# Patient Record
Sex: MALE | Race: WHITE | Employment: OTHER | URBAN - METROPOLITAN AREA
[De-identification: names, ages, dates, MRNs, and addresses within clinical notes are randomized per-mention and may not be internally consistent; named-entity substitution may affect disease eponyms.]

---

## 2018-03-02 ENCOUNTER — TELEPHONE (OUTPATIENT)
Dept: UROLOGY | Age: 68
End: 2018-03-02

## 2023-04-23 ENCOUNTER — APPOINTMENT (OUTPATIENT)
Dept: CT IMAGING | Age: 73
DRG: 813 | End: 2023-04-23
Payer: MEDICARE

## 2023-04-23 ENCOUNTER — HOSPITAL ENCOUNTER (INPATIENT)
Age: 73
LOS: 2 days | Discharge: HOME | DRG: 813 | End: 2023-04-25
Attending: EMERGENCY MEDICINE | Admitting: STUDENT IN AN ORGANIZED HEALTH CARE EDUCATION/TRAINING PROGRAM
Payer: MEDICARE

## 2023-04-23 DIAGNOSIS — K62.5 RECTAL BLEEDING: Primary | ICD-10-CM

## 2023-04-23 PROBLEM — K92.1 HEMATOCHEZIA: Status: ACTIVE | Noted: 2023-04-23

## 2023-04-23 PROBLEM — I48.91 ATRIAL FIBRILLATION (HCC): Status: ACTIVE | Noted: 2023-04-23

## 2023-04-23 PROBLEM — E11.9 DIABETES MELLITUS (HCC): Status: ACTIVE | Noted: 2023-04-23

## 2023-04-23 PROBLEM — R79.1 SUPRATHERAPEUTIC INR: Status: ACTIVE | Noted: 2023-04-23

## 2023-04-23 LAB
ABO/RH: NORMAL
ABSOLUTE EOS #: 0 K/UL (ref 0–0.4)
ABSOLUTE LYMPH #: 0.9 K/UL (ref 1–4.8)
ABSOLUTE MONO #: 0.3 K/UL (ref 0.1–1.2)
ALBUMIN SERPL-MCNC: 4.3 G/DL (ref 3.5–5.2)
ALBUMIN/GLOBULIN RATIO: 1.5 (ref 1–2.5)
ALP SERPL-CCNC: 69 U/L (ref 40–129)
ALT SERPL-CCNC: 28 U/L (ref 5–41)
ANION GAP SERPL CALCULATED.3IONS-SCNC: 12 MMOL/L (ref 9–17)
ANTIBODY SCREEN: NEGATIVE
ARM BAND NUMBER: NORMAL
AST SERPL-CCNC: 23 U/L
BASOPHILS # BLD: 1 % (ref 0–2)
BASOPHILS ABSOLUTE: 0 K/UL (ref 0–0.2)
BILIRUB SERPL-MCNC: 1 MG/DL (ref 0.3–1.2)
BUN SERPL-MCNC: 17 MG/DL (ref 8–23)
CALCIUM SERPL-MCNC: 10.6 MG/DL (ref 8.6–10.4)
CHLORIDE SERPL-SCNC: 103 MMOL/L (ref 98–107)
CO2 SERPL-SCNC: 21 MMOL/L (ref 20–31)
CREAT SERPL-MCNC: 0.97 MG/DL (ref 0.7–1.2)
EOSINOPHILS RELATIVE PERCENT: 1 % (ref 1–4)
EXPIRATION DATE: NORMAL
GFR SERPL CREATININE-BSD FRML MDRD: >60 ML/MIN/1.73M2
GLUCOSE BLD-MCNC: 128 MG/DL (ref 75–110)
GLUCOSE SERPL-MCNC: 157 MG/DL (ref 70–99)
HCT VFR BLD AUTO: 43.2 % (ref 41–53)
HGB BLD-MCNC: 14.1 G/DL (ref 13.5–17.5)
INR PPP: 7.2
LACTATE PLASV-SCNC: 2.2 MMOL/L (ref 0.5–2.2)
LIPASE SERPL-CCNC: 35 U/L (ref 13–60)
LYMPHOCYTES # BLD: 26 % (ref 24–44)
MCH RBC QN AUTO: 29.9 PG (ref 26–34)
MCHC RBC AUTO-ENTMCNC: 32.7 G/DL (ref 31–37)
MCV RBC AUTO: 91.3 FL (ref 80–100)
MONOCYTES # BLD: 8 % (ref 2–11)
PARTIAL THROMBOPLASTIN TIME: 57.6 SEC (ref 21.3–31.3)
PDW BLD-RTO: 14.4 % (ref 12.5–15.4)
PLATELET # BLD AUTO: 170 K/UL (ref 140–450)
PMV BLD AUTO: 8.7 FL (ref 6–12)
POTASSIUM SERPL-SCNC: 4.4 MMOL/L (ref 3.7–5.3)
PROT SERPL-MCNC: 7.1 G/DL (ref 6.4–8.3)
PROTHROMBIN TIME: 70.6 SEC (ref 9.4–12.6)
RBC # BLD: 4.73 M/UL (ref 4.5–5.9)
SEG NEUTROPHILS: 64 % (ref 36–66)
SEGMENTED NEUTROPHILS ABSOLUTE COUNT: 2.2 K/UL (ref 1.8–7.7)
SODIUM SERPL-SCNC: 136 MMOL/L (ref 135–144)
WBC # BLD AUTO: 3.5 K/UL (ref 3.5–11)

## 2023-04-23 PROCEDURE — P9017 PLASMA 1 DONOR FRZ W/IN 8 HR: HCPCS

## 2023-04-23 PROCEDURE — 85730 THROMBOPLASTIN TIME PARTIAL: CPT

## 2023-04-23 PROCEDURE — 99285 EMERGENCY DEPT VISIT HI MDM: CPT

## 2023-04-23 PROCEDURE — 2060000000 HC ICU INTERMEDIATE R&B

## 2023-04-23 PROCEDURE — 85025 COMPLETE CBC W/AUTO DIFF WBC: CPT

## 2023-04-23 PROCEDURE — 2580000003 HC RX 258: Performed by: STUDENT IN AN ORGANIZED HEALTH CARE EDUCATION/TRAINING PROGRAM

## 2023-04-23 PROCEDURE — 86901 BLOOD TYPING SEROLOGIC RH(D): CPT

## 2023-04-23 PROCEDURE — 80053 COMPREHEN METABOLIC PANEL: CPT

## 2023-04-23 PROCEDURE — 36415 COLL VENOUS BLD VENIPUNCTURE: CPT

## 2023-04-23 PROCEDURE — 36430 TRANSFUSION BLD/BLD COMPNT: CPT

## 2023-04-23 PROCEDURE — 83690 ASSAY OF LIPASE: CPT

## 2023-04-23 PROCEDURE — 99222 1ST HOSP IP/OBS MODERATE 55: CPT | Performed by: STUDENT IN AN ORGANIZED HEALTH CARE EDUCATION/TRAINING PROGRAM

## 2023-04-23 PROCEDURE — 86927 PLASMA FRESH FROZEN: CPT

## 2023-04-23 PROCEDURE — 74176 CT ABD & PELVIS W/O CONTRAST: CPT

## 2023-04-23 PROCEDURE — 6360000002 HC RX W HCPCS

## 2023-04-23 PROCEDURE — 83605 ASSAY OF LACTIC ACID: CPT

## 2023-04-23 PROCEDURE — 86850 RBC ANTIBODY SCREEN: CPT

## 2023-04-23 PROCEDURE — 6370000000 HC RX 637 (ALT 250 FOR IP): Performed by: STUDENT IN AN ORGANIZED HEALTH CARE EDUCATION/TRAINING PROGRAM

## 2023-04-23 PROCEDURE — 6360000002 HC RX W HCPCS: Performed by: STUDENT IN AN ORGANIZED HEALTH CARE EDUCATION/TRAINING PROGRAM

## 2023-04-23 PROCEDURE — 85610 PROTHROMBIN TIME: CPT

## 2023-04-23 PROCEDURE — 86900 BLOOD TYPING SEROLOGIC ABO: CPT

## 2023-04-23 PROCEDURE — 82947 ASSAY GLUCOSE BLOOD QUANT: CPT

## 2023-04-23 RX ORDER — ISOSORBIDE MONONITRATE 60 MG/1
60 TABLET, EXTENDED RELEASE ORAL DAILY
Status: DISCONTINUED | OUTPATIENT
Start: 2023-04-24 | End: 2023-04-25 | Stop reason: HOSPADM

## 2023-04-23 RX ORDER — TRAMADOL HYDROCHLORIDE 50 MG/1
25 TABLET ORAL 3 TIMES DAILY
Status: DISCONTINUED | OUTPATIENT
Start: 2023-04-23 | End: 2023-04-24

## 2023-04-23 RX ORDER — WARFARIN SODIUM 2.5 MG/1
2.5 TABLET ORAL
COMMUNITY

## 2023-04-23 RX ORDER — METOPROLOL SUCCINATE 50 MG/1
50 TABLET, EXTENDED RELEASE ORAL DAILY
COMMUNITY

## 2023-04-23 RX ORDER — LOSARTAN POTASSIUM 50 MG/1
50 TABLET ORAL DAILY
COMMUNITY

## 2023-04-23 RX ORDER — ATORVASTATIN CALCIUM 40 MG/1
40 TABLET, FILM COATED ORAL NIGHTLY
COMMUNITY

## 2023-04-23 RX ORDER — INSULIN LISPRO 100 [IU]/ML
2 INJECTION, SOLUTION INTRAVENOUS; SUBCUTANEOUS
Status: DISCONTINUED | OUTPATIENT
Start: 2023-04-23 | End: 2023-04-25 | Stop reason: HOSPADM

## 2023-04-23 RX ORDER — METHYLPREDNISOLONE SODIUM SUCCINATE 40 MG/ML
INJECTION, POWDER, LYOPHILIZED, FOR SOLUTION INTRAMUSCULAR; INTRAVENOUS
Status: DISCONTINUED
Start: 2023-04-23 | End: 2023-04-23

## 2023-04-23 RX ORDER — ONDANSETRON 4 MG/1
4 TABLET, ORALLY DISINTEGRATING ORAL EVERY 8 HOURS PRN
Status: DISCONTINUED | OUTPATIENT
Start: 2023-04-23 | End: 2023-04-25 | Stop reason: HOSPADM

## 2023-04-23 RX ORDER — ATORVASTATIN CALCIUM 20 MG/1
20 TABLET, FILM COATED ORAL NIGHTLY
Status: DISCONTINUED | OUTPATIENT
Start: 2023-04-23 | End: 2023-04-25 | Stop reason: HOSPADM

## 2023-04-23 RX ORDER — PHYTONADIONE 5 MG/1
5 TABLET ORAL ONCE
Status: COMPLETED | OUTPATIENT
Start: 2023-04-23 | End: 2023-04-23

## 2023-04-23 RX ORDER — METHYLPREDNISOLONE SODIUM SUCCINATE 40 MG/ML
40 INJECTION, POWDER, LYOPHILIZED, FOR SOLUTION INTRAMUSCULAR; INTRAVENOUS ONCE
Status: CANCELLED | OUTPATIENT
Start: 2023-04-23 | End: 2023-04-23

## 2023-04-23 RX ORDER — SODIUM CHLORIDE 0.9 % (FLUSH) 0.9 %
5-40 SYRINGE (ML) INJECTION EVERY 12 HOURS SCHEDULED
Status: DISCONTINUED | OUTPATIENT
Start: 2023-04-23 | End: 2023-04-25 | Stop reason: HOSPADM

## 2023-04-23 RX ORDER — CARVEDILOL 3.12 MG/1
6.25 TABLET ORAL 2 TIMES DAILY WITH MEALS
Status: DISCONTINUED | OUTPATIENT
Start: 2023-04-23 | End: 2023-04-23

## 2023-04-23 RX ORDER — HYDRALAZINE HYDROCHLORIDE 50 MG/1
100 TABLET, FILM COATED ORAL 3 TIMES DAILY
Status: DISCONTINUED | OUTPATIENT
Start: 2023-04-23 | End: 2023-04-23

## 2023-04-23 RX ORDER — INSULIN GLARGINE 100 [IU]/ML
11 INJECTION, SOLUTION SUBCUTANEOUS NIGHTLY
Status: DISCONTINUED | OUTPATIENT
Start: 2023-04-23 | End: 2023-04-25 | Stop reason: HOSPADM

## 2023-04-23 RX ORDER — METHYLPREDNISOLONE SODIUM SUCCINATE 40 MG/ML
40 INJECTION, POWDER, LYOPHILIZED, FOR SOLUTION INTRAMUSCULAR; INTRAVENOUS ONCE
Status: COMPLETED | OUTPATIENT
Start: 2023-04-23 | End: 2023-04-23

## 2023-04-23 RX ORDER — NITROGLYCERIN 0.4 MG/1
0.4 TABLET SUBLINGUAL EVERY 5 MIN PRN
COMMUNITY

## 2023-04-23 RX ORDER — ONDANSETRON 2 MG/ML
4 INJECTION INTRAMUSCULAR; INTRAVENOUS EVERY 6 HOURS PRN
Status: DISCONTINUED | OUTPATIENT
Start: 2023-04-23 | End: 2023-04-25 | Stop reason: HOSPADM

## 2023-04-23 RX ORDER — LOSARTAN POTASSIUM 50 MG/1
50 TABLET ORAL DAILY
Status: DISCONTINUED | OUTPATIENT
Start: 2023-04-23 | End: 2023-04-25 | Stop reason: HOSPADM

## 2023-04-23 RX ORDER — DEXTROSE MONOHYDRATE 100 MG/ML
INJECTION, SOLUTION INTRAVENOUS CONTINUOUS PRN
Status: DISCONTINUED | OUTPATIENT
Start: 2023-04-23 | End: 2023-04-25 | Stop reason: HOSPADM

## 2023-04-23 RX ORDER — GABAPENTIN 400 MG/1
400 CAPSULE ORAL DAILY
Status: DISCONTINUED | OUTPATIENT
Start: 2023-04-24 | End: 2023-04-25 | Stop reason: HOSPADM

## 2023-04-23 RX ORDER — TAMSULOSIN HYDROCHLORIDE 0.4 MG/1
0.4 CAPSULE ORAL DAILY
Status: DISCONTINUED | OUTPATIENT
Start: 2023-04-24 | End: 2023-04-25 | Stop reason: HOSPADM

## 2023-04-23 RX ORDER — SODIUM CHLORIDE 0.9 % (FLUSH) 0.9 %
5-40 SYRINGE (ML) INJECTION PRN
Status: DISCONTINUED | OUTPATIENT
Start: 2023-04-23 | End: 2023-04-25 | Stop reason: HOSPADM

## 2023-04-23 RX ORDER — SODIUM CHLORIDE 9 MG/ML
INJECTION, SOLUTION INTRAVENOUS PRN
Status: DISCONTINUED | OUTPATIENT
Start: 2023-04-23 | End: 2023-04-25 | Stop reason: HOSPADM

## 2023-04-23 RX ORDER — METOPROLOL SUCCINATE 50 MG/1
50 TABLET, EXTENDED RELEASE ORAL DAILY
Status: DISCONTINUED | OUTPATIENT
Start: 2023-04-23 | End: 2023-04-25 | Stop reason: HOSPADM

## 2023-04-23 RX ORDER — POLYETHYLENE GLYCOL 3350 17 G/17G
17 POWDER, FOR SOLUTION ORAL DAILY PRN
Status: DISCONTINUED | OUTPATIENT
Start: 2023-04-23 | End: 2023-04-25 | Stop reason: HOSPADM

## 2023-04-23 RX ORDER — ACETAMINOPHEN 650 MG/1
650 SUPPOSITORY RECTAL EVERY 6 HOURS PRN
Status: DISCONTINUED | OUTPATIENT
Start: 2023-04-23 | End: 2023-04-25 | Stop reason: HOSPADM

## 2023-04-23 RX ORDER — ACETAMINOPHEN 325 MG/1
650 TABLET ORAL EVERY 6 HOURS PRN
Status: DISCONTINUED | OUTPATIENT
Start: 2023-04-23 | End: 2023-04-25 | Stop reason: HOSPADM

## 2023-04-23 RX ORDER — CLONIDINE HYDROCHLORIDE 0.1 MG/1
0.2 TABLET ORAL 2 TIMES DAILY
Status: DISCONTINUED | OUTPATIENT
Start: 2023-04-23 | End: 2023-04-23

## 2023-04-23 RX ADMIN — METHYLPREDNISOLONE SODIUM SUCCINATE 40 MG: 40 INJECTION, POWDER, FOR SOLUTION INTRAMUSCULAR; INTRAVENOUS at 16:16

## 2023-04-23 RX ADMIN — PHYTONADIONE 5 MG: 5 TABLET ORAL at 22:41

## 2023-04-23 RX ADMIN — SODIUM CHLORIDE, PRESERVATIVE FREE 10 ML: 5 INJECTION INTRAVENOUS at 21:31

## 2023-04-23 RX ADMIN — ATORVASTATIN CALCIUM 20 MG: 20 TABLET, FILM COATED ORAL at 21:31

## 2023-04-23 RX ADMIN — METHYLPREDNISOLONE SODIUM SUCCINATE 40 MG: 40 INJECTION, POWDER, LYOPHILIZED, FOR SOLUTION INTRAMUSCULAR; INTRAVENOUS at 16:16

## 2023-04-23 RX ADMIN — PHYTONADIONE 10 MG: 10 INJECTION, EMULSION INTRAMUSCULAR; INTRAVENOUS; SUBCUTANEOUS at 15:43

## 2023-04-23 ASSESSMENT — ENCOUNTER SYMPTOMS
DIARRHEA: 0
BACK PAIN: 0
NAUSEA: 0
BLOOD IN STOOL: 1
COUGH: 0
SORE THROAT: 0
WHEEZING: 0
ABDOMINAL PAIN: 1
VOMITING: 0
SHORTNESS OF BREATH: 0

## 2023-04-23 ASSESSMENT — PAIN SCALES - GENERAL
PAINLEVEL_OUTOF10: 1
PAINLEVEL_OUTOF10: 2

## 2023-04-23 ASSESSMENT — PAIN DESCRIPTION - LOCATION
LOCATION: ABDOMEN
LOCATION: ANKLE

## 2023-04-23 ASSESSMENT — PAIN DESCRIPTION - ORIENTATION
ORIENTATION: LEFT
ORIENTATION: LOWER

## 2023-04-23 ASSESSMENT — PAIN DESCRIPTION - DESCRIPTORS: DESCRIPTORS: ACHING

## 2023-04-23 ASSESSMENT — PAIN - FUNCTIONAL ASSESSMENT: PAIN_FUNCTIONAL_ASSESSMENT: 0-10

## 2023-04-24 LAB
ABSOLUTE EOS #: 0 K/UL (ref 0–0.4)
ABSOLUTE EOS #: 0 K/UL (ref 0–0.4)
ABSOLUTE LYMPH #: 0.5 K/UL (ref 1–4.8)
ABSOLUTE LYMPH #: 0.9 K/UL (ref 1–4.8)
ABSOLUTE MONO #: 0 K/UL (ref 0.1–1.2)
ABSOLUTE MONO #: 0.8 K/UL (ref 0.1–1.2)
ANION GAP SERPL CALCULATED.3IONS-SCNC: 15 MMOL/L (ref 9–17)
BASOPHILS # BLD: 0 % (ref 0–2)
BASOPHILS # BLD: 0 % (ref 0–2)
BASOPHILS ABSOLUTE: 0 K/UL (ref 0–0.2)
BASOPHILS ABSOLUTE: 0 K/UL (ref 0–0.2)
BLD PROD TYP BPU: NORMAL
BLOOD BANK BLOOD PRODUCT EXPIRATION DATE: NORMAL
BLOOD BANK ISBT PRODUCT BLOOD TYPE: 6200
BLOOD BANK PRODUCT CODE: NORMAL
BLOOD BANK UNIT TYPE AND RH: NORMAL
BPU ID: NORMAL
BUN SERPL-MCNC: 20 MG/DL (ref 8–23)
CALCIUM SERPL-MCNC: 10.5 MG/DL (ref 8.6–10.4)
CHLORIDE SERPL-SCNC: 103 MMOL/L (ref 98–107)
CO2 SERPL-SCNC: 17 MMOL/L (ref 20–31)
CREAT SERPL-MCNC: 1.1 MG/DL (ref 0.7–1.2)
DISPENSE STATUS BLOOD BANK: NORMAL
EOSINOPHILS RELATIVE PERCENT: 0 % (ref 1–4)
EOSINOPHILS RELATIVE PERCENT: 0 % (ref 1–4)
GFR SERPL CREATININE-BSD FRML MDRD: >60 ML/MIN/1.73M2
GLUCOSE BLD-MCNC: 142 MG/DL (ref 75–110)
GLUCOSE BLD-MCNC: 155 MG/DL (ref 75–110)
GLUCOSE BLD-MCNC: 237 MG/DL (ref 75–110)
GLUCOSE BLD-MCNC: 241 MG/DL (ref 75–110)
GLUCOSE SERPL-MCNC: 204 MG/DL (ref 70–99)
HCT VFR BLD AUTO: 41.1 % (ref 41–53)
HCT VFR BLD AUTO: 42.7 % (ref 41–53)
HGB BLD-MCNC: 13.6 G/DL (ref 13.5–17.5)
HGB BLD-MCNC: 14 G/DL (ref 13.5–17.5)
INR PPP: 1.9
LYMPHOCYTES # BLD: 10 % (ref 24–44)
LYMPHOCYTES # BLD: 10 % (ref 24–44)
MCH RBC QN AUTO: 29.9 PG (ref 26–34)
MCH RBC QN AUTO: 30.3 PG (ref 26–34)
MCHC RBC AUTO-ENTMCNC: 32.7 G/DL (ref 31–37)
MCHC RBC AUTO-ENTMCNC: 33.2 G/DL (ref 31–37)
MCV RBC AUTO: 91.2 FL (ref 80–100)
MCV RBC AUTO: 91.5 FL (ref 80–100)
MONOCYTES # BLD: 1 % (ref 2–11)
MONOCYTES # BLD: 8 % (ref 2–11)
PDW BLD-RTO: 14.5 % (ref 12.5–15.4)
PDW BLD-RTO: 14.6 % (ref 12.5–15.4)
PLATELET # BLD AUTO: 183 K/UL (ref 140–450)
PLATELET # BLD AUTO: 196 K/UL (ref 140–450)
PMV BLD AUTO: 8.5 FL (ref 6–12)
PMV BLD AUTO: 9.4 FL (ref 6–12)
POTASSIUM SERPL-SCNC: 4.5 MMOL/L (ref 3.7–5.3)
PROTHROMBIN TIME: 20.1 SEC (ref 9.4–12.6)
RBC # BLD: 4.51 M/UL (ref 4.5–5.9)
RBC # BLD: 4.67 M/UL (ref 4.5–5.9)
SEG NEUTROPHILS: 82 % (ref 36–66)
SEG NEUTROPHILS: 89 % (ref 36–66)
SEGMENTED NEUTROPHILS ABSOLUTE COUNT: 4.4 K/UL (ref 1.8–7.7)
SEGMENTED NEUTROPHILS ABSOLUTE COUNT: 7.8 K/UL (ref 1.8–7.7)
SODIUM SERPL-SCNC: 135 MMOL/L (ref 135–144)
TRANSFUSION STATUS: NORMAL
UNIT DIVISION: 0
UNIT ISSUE DATE/TIME: NORMAL
WBC # BLD AUTO: 4.9 K/UL (ref 3.5–11)
WBC # BLD AUTO: 9.5 K/UL (ref 3.5–11)

## 2023-04-24 PROCEDURE — 36415 COLL VENOUS BLD VENIPUNCTURE: CPT

## 2023-04-24 PROCEDURE — 6370000000 HC RX 637 (ALT 250 FOR IP): Performed by: STUDENT IN AN ORGANIZED HEALTH CARE EDUCATION/TRAINING PROGRAM

## 2023-04-24 PROCEDURE — 80048 BASIC METABOLIC PNL TOTAL CA: CPT

## 2023-04-24 PROCEDURE — 2060000000 HC ICU INTERMEDIATE R&B

## 2023-04-24 PROCEDURE — 85610 PROTHROMBIN TIME: CPT

## 2023-04-24 PROCEDURE — 99232 SBSQ HOSP IP/OBS MODERATE 35: CPT | Performed by: STUDENT IN AN ORGANIZED HEALTH CARE EDUCATION/TRAINING PROGRAM

## 2023-04-24 PROCEDURE — 2580000003 HC RX 258: Performed by: STUDENT IN AN ORGANIZED HEALTH CARE EDUCATION/TRAINING PROGRAM

## 2023-04-24 PROCEDURE — 85025 COMPLETE CBC W/AUTO DIFF WBC: CPT

## 2023-04-24 PROCEDURE — 82947 ASSAY GLUCOSE BLOOD QUANT: CPT

## 2023-04-24 RX ORDER — HYDROCORTISONE ACETATE 25 MG/1
25 SUPPOSITORY RECTAL 2 TIMES DAILY
Status: DISCONTINUED | OUTPATIENT
Start: 2023-04-24 | End: 2023-04-25 | Stop reason: HOSPADM

## 2023-04-24 RX ORDER — TRAMADOL HYDROCHLORIDE 50 MG/1
25 TABLET ORAL EVERY 6 HOURS PRN
Status: DISCONTINUED | OUTPATIENT
Start: 2023-04-24 | End: 2023-04-25 | Stop reason: HOSPADM

## 2023-04-24 RX ADMIN — HYDROCORTISONE ACETATE 25 MG: 25 SUPPOSITORY RECTAL at 21:49

## 2023-04-24 RX ADMIN — TAMSULOSIN HYDROCHLORIDE 0.4 MG: 0.4 CAPSULE ORAL at 10:03

## 2023-04-24 RX ADMIN — SODIUM CHLORIDE, PRESERVATIVE FREE 10 ML: 5 INJECTION INTRAVENOUS at 10:04

## 2023-04-24 RX ADMIN — ATORVASTATIN CALCIUM 20 MG: 20 TABLET, FILM COATED ORAL at 21:49

## 2023-04-24 RX ADMIN — METOPROLOL SUCCINATE 50 MG: 50 TABLET, EXTENDED RELEASE ORAL at 10:03

## 2023-04-24 RX ADMIN — SODIUM CHLORIDE, PRESERVATIVE FREE 10 ML: 5 INJECTION INTRAVENOUS at 21:52

## 2023-04-24 RX ADMIN — GABAPENTIN 400 MG: 400 CAPSULE ORAL at 10:03

## 2023-04-24 RX ADMIN — INSULIN GLARGINE 11 UNITS: 100 INJECTION, SOLUTION SUBCUTANEOUS at 21:49

## 2023-04-24 RX ADMIN — LOSARTAN POTASSIUM 50 MG: 50 TABLET, FILM COATED ORAL at 10:03

## 2023-04-24 RX ADMIN — ISOSORBIDE MONONITRATE 60 MG: 60 TABLET, EXTENDED RELEASE ORAL at 10:04

## 2023-04-24 RX ADMIN — INSULIN LISPRO 2 UNITS: 100 INJECTION, SOLUTION INTRAVENOUS; SUBCUTANEOUS at 13:05

## 2023-04-24 ASSESSMENT — PAIN SCALES - GENERAL
PAINLEVEL_OUTOF10: 2
PAINLEVEL_OUTOF10: 0

## 2023-04-24 NOTE — PLAN OF CARE
PLAN OF CARE    71-year-old male with history of chronic A-fib on Coumadin, left-sided diverticulosis who presented with maroon stools and rectal bleeding with INR of 7 on admission. Patient received vitamin K and FFP. Repeat INR is 1.9. Apparently patient had decrease in bloody stools. Hemoglobin and hemodynamics are stable. Hemoglobin is stable at 13-14 gm/dL. Suspect hemorrhoidal vs diverticular bleed  Keep patient NPO after midnight for possible scope in AM.  Monitor H&H and transfuse as clinically indicated.

## 2023-04-24 NOTE — PROGRESS NOTES
Comprehensive Nutrition Assessment    Type and Reason for Visit:  Initial, Positive Nutrition Screen    Nutrition Recommendations/Plan:   Continue current diet, encourage PO intake  Continue ONS to promote adequate intake  Monitor weight, intake, labs, skin, diet status     Malnutrition Assessment:  Malnutrition Status:  No malnutrition (04/24/23 1133)    Context:  Acute Illness     Findings of the 6 clinical characteristics of malnutrition:  Energy Intake:  Mild decrease in energy intake (Comment)  Weight Loss:  No significant weight loss     Body Fat Loss:  No significant body fat loss     Muscle Mass Loss:  No significant muscle mass loss    Fluid Accumulation:  No significant fluid accumulation     Strength:  Not Performed    Nutrition Assessment:    Pt admitted with hematochezia. PNS recieved for recent weight loss and poor appetite. Pt states his appetite has been poor over the last two days, but has had a good appetite outside of admission. Pt is unsure about recent weight loss, states UBW is ~190#. No weight loss per EMR. Pt states his appetite is good today. CL ONS initiated upon admission, observed pt drinking 100% ONS. No nutritional concerns at this time. Nutrition Related Findings:    No edema noted. Co2 17 (L),  (H), Ca 10.5 (H). All other labs/meds reviewed. Wound Type: None       Current Nutrition Intake & Therapies:    Average Meal Intake: Unable to assess  Average Supplements Intake: Unable to assess  ADULT ORAL NUTRITION SUPPLEMENT; Breakfast, Lunch, Dinner; Clear Liquid Oral Supplement  ADULT DIET; Clear Liquid    Anthropometric Measures:  Height: 5' 3\" (160 cm)  Ideal Body Weight (IBW): 124 lbs (56 kg)    Current Body Weight: 190 lb (86.2 kg), 153.2 % IBW. Current BMI (kg/m2): 33.7  Weight Adjustment For: No Adjustment  BMI Categories: Obese Class 1 (BMI 30.0-34. 9)    Estimated Daily Nutrient Needs:  Energy Requirements Based On: Formula  Weight Used for Energy Requirements:

## 2023-04-24 NOTE — PROGRESS NOTES
Occupational [address]  Occupational Therapy Not Seen Note    DATE: 2023    NAME: Hayden Otero  MRN: [id number]   : 1950      Patient not seen this date for Occupational Therapy due to:    Pt demonstrated functional mobility within hospital unit this date without use of AD. Pt demonstrated steady gait and was independent with functional sit <> stand transfers from EOB and toilet. Patient independent with ADLs and functional tasks with no acute OT needs. Pt reports wife in good health to provide assistance if needed and has a good support system to offer assistance as well. Pt aalso reported no concerns for returning home and completing functional tasks and ADLs. Will defer OT evaluation at this time. Please reorder OT if future needs arise.        Electronically signed by Adriana Tsai S/OT on 2023 at 10:56 AM

## 2023-04-24 NOTE — PROGRESS NOTES
FFP infusion completed. Patient denies any pain , sob or s/s of reaction. VS charted and WNL except hr is 59, patient states that is his baseline HR.

## 2023-04-24 NOTE — PROGRESS NOTES
[facility]  Office: [address], DO, Roberto Mcrae, DO, Gudelia [place], DO, Debby Elva Fraser, DO, Aguilar Matthews MD, Darren Mcgowan MD, Princess Eddi MD, Marleny Dhillon MD,  Nguyen Desouza MD, Jennifer Hassan MD, Ladell Scheuermann, DO, Herber Abdullahi MD,  Adelia Raya MD, Danny Ley MD, Xavier Molina DO, Jeannine Tena MD, Eris Kan MD, Ramona Blanco DO, Cate Salas MD, Maribel Goode MD, Ene Carter MD, Curry Flores MD,  George Roth DO, Simeon Hodge MD,  Kyara Urbina, [facility],  Rita Bernard, CNP, Sendy Medellin, CNP, Dana Pearce, CNP,  Wil Mccloud, [facility], Francisco Morgan, CNP, Marleny Green, CNP, Giancarlo Bernabe, CNP, Arleen Alpers, CNP, Cheri Mccoy, CNP, Jerene Oppenheim, [facility], Huong Sykes, CNS, Jorden Ferro, CNP, [facility], [address] [place] [address]    Progress Note    4/24/2023    11:42 AM    Name:   Milena Kay  MRN:     [id number]     [place]:      [de-identified]   Room:   [address] [place] [address] Day:  1  Admit Date:  4/23/2023 11:40 AM    PCP:   Kj Lozano MD  Code Status:  Full Code    Subjective:     Patient was seen and examined at bedside this AM. He reports feeling well and is without complaint other than continued, intermittent rectal bleeding. INR has normalized with FFP. Gastroenterology has been consulted; appreciate recommendations. Medications: Allergies:     Allergies   Allergen Reactions    Lisinopril        Current Meds:   Scheduled Meds:    gabapentin  400 mg Oral Daily    isosorbide mononitrate  60 mg Oral Daily    atorvastatin  20 mg Oral Nightly    tamsulosin  0.4 mg Oral Daily    sodium chloride flush  5-40 mL IntraVENous 2 times per day    losartan  50 mg Oral Daily    metoprolol succinate  50 mg Oral Daily    insulin glargine  11 Units SubCUTAneous Nightly    insulin lispro  2 Units SubCUTAneous TID WC     Continuous Infusions:    sodium chloride      sodium

## 2023-04-24 NOTE — PROGRESS NOTES
Physical Therapy        Physical Therapy Cancel Note      DATE: 2023    NAME: Shayy Mike  MRN: [id number]   : 1950      Patient not seen this date for Physical Therapy due to:    Patient independent with functional mobility. Will defer PT evaluation at this time. Please reorder PT if future needs arise. Pt ambulated without device x 2 bouts without LOB or difficulty.       Electronically signed by Sonam Britton PT on 2023 at 11:11 AM

## 2023-04-25 VITALS
SYSTOLIC BLOOD PRESSURE: 118 MMHG | BODY MASS INDEX: 33.66 KG/M2 | HEART RATE: 60 BPM | RESPIRATION RATE: 18 BRPM | WEIGHT: 190 LBS | TEMPERATURE: 97.7 F | HEIGHT: 63 IN | OXYGEN SATURATION: 100 % | DIASTOLIC BLOOD PRESSURE: 82 MMHG

## 2023-04-25 PROBLEM — K62.5 RECTAL BLEEDING: Status: ACTIVE | Noted: 2023-04-25

## 2023-04-25 LAB
ABSOLUTE EOS #: 0.02 K/UL (ref 0–0.4)
ABSOLUTE LYMPH #: 1.45 K/UL (ref 1–4.8)
ABSOLUTE MONO #: 0.7 K/UL (ref 0.1–1.2)
ANION GAP SERPL CALCULATED.3IONS-SCNC: 15 MMOL/L (ref 9–17)
BASOPHILS # BLD: 0 % (ref 0–2)
BASOPHILS ABSOLUTE: 0 K/UL (ref 0–0.2)
BUN SERPL-MCNC: 21 MG/DL (ref 8–23)
CALCIUM SERPL-MCNC: 10.4 MG/DL (ref 8.6–10.4)
CHLORIDE SERPL-SCNC: 105 MMOL/L (ref 98–107)
CO2 SERPL-SCNC: 19 MMOL/L (ref 20–31)
CREAT SERPL-MCNC: 1.01 MG/DL (ref 0.7–1.2)
EOSINOPHILS RELATIVE PERCENT: 0 % (ref 1–4)
GFR SERPL CREATININE-BSD FRML MDRD: >60 ML/MIN/1.73M2
GLUCOSE BLD-MCNC: 105 MG/DL (ref 75–110)
GLUCOSE SERPL-MCNC: 145 MG/DL (ref 70–99)
HCT VFR BLD AUTO: 40.3 % (ref 41–53)
HGB BLD-MCNC: 13.3 G/DL (ref 13.5–17.5)
LYMPHOCYTES # BLD: 22 % (ref 24–44)
MCH RBC QN AUTO: 30 PG (ref 26–34)
MCHC RBC AUTO-ENTMCNC: 33 G/DL (ref 31–37)
MCV RBC AUTO: 90.8 FL (ref 80–100)
MONOCYTES # BLD: 11 % (ref 2–11)
PDW BLD-RTO: 14 % (ref 12.5–15.4)
PLATELET # BLD AUTO: 175 K/UL (ref 140–450)
PMV BLD AUTO: 10.5 FL (ref 8–14)
POTASSIUM SERPL-SCNC: 4.6 MMOL/L (ref 3.7–5.3)
RBC # BLD: 4.44 M/UL (ref 4.5–5.9)
SEG NEUTROPHILS: 67 % (ref 36–66)
SEGMENTED NEUTROPHILS ABSOLUTE COUNT: 4.45 K/UL (ref 1.8–7.7)
SODIUM SERPL-SCNC: 139 MMOL/L (ref 135–144)
WBC # BLD AUTO: 6.6 K/UL (ref 3.5–11)

## 2023-04-25 PROCEDURE — 99221 1ST HOSP IP/OBS SF/LOW 40: CPT | Performed by: INTERNAL MEDICINE

## 2023-04-25 PROCEDURE — 6370000000 HC RX 637 (ALT 250 FOR IP): Performed by: STUDENT IN AN ORGANIZED HEALTH CARE EDUCATION/TRAINING PROGRAM

## 2023-04-25 PROCEDURE — 85025 COMPLETE CBC W/AUTO DIFF WBC: CPT

## 2023-04-25 PROCEDURE — 36415 COLL VENOUS BLD VENIPUNCTURE: CPT

## 2023-04-25 PROCEDURE — 82947 ASSAY GLUCOSE BLOOD QUANT: CPT

## 2023-04-25 PROCEDURE — 80048 BASIC METABOLIC PNL TOTAL CA: CPT

## 2023-04-25 PROCEDURE — 99238 HOSP IP/OBS DSCHRG MGMT 30/<: CPT | Performed by: STUDENT IN AN ORGANIZED HEALTH CARE EDUCATION/TRAINING PROGRAM

## 2023-04-25 PROCEDURE — 2580000003 HC RX 258: Performed by: STUDENT IN AN ORGANIZED HEALTH CARE EDUCATION/TRAINING PROGRAM

## 2023-04-25 RX ADMIN — SODIUM CHLORIDE, PRESERVATIVE FREE 10 ML: 5 INJECTION INTRAVENOUS at 09:02

## 2023-04-25 RX ADMIN — LOSARTAN POTASSIUM 50 MG: 50 TABLET, FILM COATED ORAL at 10:34

## 2023-04-25 RX ADMIN — ISOSORBIDE MONONITRATE 60 MG: 60 TABLET, EXTENDED RELEASE ORAL at 10:34

## 2023-04-25 RX ADMIN — HYDROCORTISONE ACETATE 25 MG: 25 SUPPOSITORY RECTAL at 09:07

## 2023-04-25 RX ADMIN — TAMSULOSIN HYDROCHLORIDE 0.4 MG: 0.4 CAPSULE ORAL at 10:34

## 2023-04-25 RX ADMIN — GABAPENTIN 400 MG: 400 CAPSULE ORAL at 10:34

## 2023-04-25 RX ADMIN — METOPROLOL SUCCINATE 50 MG: 50 TABLET, EXTENDED RELEASE ORAL at 10:34

## 2023-04-25 NOTE — PROGRESS NOTES
Pt discharged with all belongings, scripts and discharge paperwork. Follow up appointments and discharge instructions reviewed with pt and care giver. All question answered to satisfaction.

## 2023-04-25 NOTE — PLAN OF CARE
Problem: Discharge Planning  Goal: Discharge to home or other facility with appropriate resources  4/25/2023 1056 by Wauneta Lundborg, RN  Outcome: Completed  Flowsheets (Taken 4/25/2023 0800)  Discharge to home or other facility with appropriate resources: Identify barriers to discharge with patient and caregiver  4/24/2023 2346 by Sergio Porras RN  Outcome: Progressing   Patients questions and concerns addressed and answered. Problem: Pain  Goal: Verbalizes/displays adequate comfort level or baseline comfort level  4/25/2023 1056 by Wauneta Lundborg, RN  Outcome: Completed  4/24/2023 2346 by Sergio Porras RN  Outcome: Progressing  Pain scale preformed per protocol and pt treated for pain as documented. Education given. Problem: Safety - Adult  Goal: Free from fall injury  4/25/2023 1056 by Wauneta Lundborg, RN  Outcome: Completed  4/24/2023 2346 by Sergio Porras RN  Outcome: Progressing  Flowsheets (Taken 4/24/2023 1642 by Jose Durán RN)  Free From Fall Injury: Instruct family/caregiver on patient safety  Fall assessment preformed. Bed in low locked position with call light and tray table within reach. Education given. Will continue to monitor. Problem: ABCDS Injury Assessment  Goal: Absence of physical injury  4/25/2023 1056 by Wauneta Lundborg, RN  Outcome: Completed  4/24/2023 2346 by Sergio Porras RN  Outcome: Progressing  Fall assessment preformed. Bed in low locked position with call light and tray table within reach. Education given. Will continue to monitor.       Problem: Chronic Conditions and Co-morbidities  Goal: Patient's chronic conditions and co-morbidity symptoms are monitored and maintained or improved  4/25/2023 1056 by Wauneta Lundborg, RN  Outcome: Completed  4/24/2023 2346 by Sergio Porras RN  Outcome: Progressing

## 2023-04-25 NOTE — PLAN OF CARE
Problem: Discharge Planning  Goal: Discharge to home or other facility with appropriate resources  4/24/2023 2346 by Queta Valdez RN  Outcome: Progressing     Problem: Pain  Goal: Verbalizes/displays adequate comfort level or baseline comfort level  4/24/2023 2346 by Queta Valdez RN  Outcome: Progressing     Problem: Safety - Adult  Goal: Free from fall injury  4/24/2023 2346 by Queta Valdez RN  Outcome: Kirit Weber (Taken 4/24/2023 1642 by Renetta John RN)  Free From Fall Injury: Dafne Lujan family/caregiver on patient safety     Problem: ABCDS Injury Assessment  Goal: Absence of physical injury  4/24/2023 2346 by Queta Valdez RN  Outcome: Progressing     Problem: Chronic Conditions and Co-morbidities  Goal: Patient's chronic conditions and co-morbidity symptoms are monitored and maintained or improved  4/24/2023 2346 by Queta Valdez RN  Outcome: Progressing

## 2023-04-25 NOTE — CARE COORDINATION
Case Management Assessment  Initial Evaluation    Date/Time of Evaluation: 4/25/2023 10:11 AM  Assessment Completed by: Margot Gutiérrez RN    If patient is discharged prior to next notation, then this note serves as note for discharge by case management. Patient Name: Juan Kaba                   YOB: 1950  Diagnosis: Hematochezia [K92.1]  Rectal bleeding [K62.5]                   Date / Time: 4/23/2023 11:40 AM    Patient Admission Status: Inpatient   Readmission Risk (Low < 19, Mod (19-27), High > 27): Readmission Risk Score: 7.8    Current PCP: Maurizio Brooke MD  PCP verified by CM? Chart Reviewed: Yes      History Provided by: Patient  Patient Orientation: Alert and Oriented    Patient Cognition: Alert    Hospitalization in the last 30 days (Readmission):  No    If yes, Readmission Assessment in CM Navigator will be completed. Advance Directives:      Code Status: Full Code   Patient's Primary Decision Maker is: Legal Next of Kin      Discharge Planning:    Patient lives with: Spouse/Significant Other Type of Home: House  Primary Care Giver: Self  Patient Support Systems include: Spouse/Significant Other, Friends/Neighbors   Current Financial resources: Medicare  Current community resources: None  Current services prior to admission: C-pap            Current DME:              Type of Home Care services:  None    ADLS  Prior functional level: Independent in ADLs/IADLs  Current functional level: Independent in ADLs/IADLs    PT AM-PAC:   /24  OT AM-PAC:   /24    Family can provide assistance at DC: Yes  Would you like Case Management to discuss the discharge plan with any other family members/significant others, and if so, who?     Plans to Return to Present Housing: Yes  Other Identified Issues/Barriers to RETURNING to current housing:   Potential Assistance needed at discharge: N/A            Potential DME:    Patient expects to discharge to: [address] [place] [address] for transportation at discharge:

## 2023-04-25 NOTE — CONSULTS
GI ATTENDING  Gastroenterology  Consultation Note     . REASON FOR CONSULTATION:  Rectal bleeding      CC : \"Rectal bleeding\"    HISTORY OF PRESENT ILLNESS:         Briefly, 61-year-old male with a past medical history of atrial fibrillation, on Coumadin, history of prostate cancer, CHF, diabetes, hypertension, hyperlipidemia, presented with supratherapeutic INR and episodes of rectal bleeding that started approximately 48 hours ago, patient reported intense amount of rectal bleeding associated with bowel movements that resolved and diminished upon arrival to the [facility] apartment. Patient denies any significant abdominal pain but did report some abdominal cramping that were additionally resolved prior to presentation. Patient denies any preceding infectious symptoms    On arrival to [facility] patient was identified to be medically stable, afebrile    Initial labs revealed:  White count of 9.4, hemoglobin 14.0, plate count of 095  Repeat hemoglobin 13.3 g/dL  BMP was unremarkable  Creatinine 1.  0    CT abdomen pelvis was performed which revealed:  No acute process  Mild left-sided hydro utero nephrosis, no obstructing mass or stone seen. Left colonic diverticulosis    Patient was identified to have a supratherapeutic INR of 7, yesterday was identified to be 1.9    Previous GI history: Per patient, colonoscopy 1 year ago at a [facility] Negative per patient.   No masses or lesions were identified    PAST MEDICAL HISTORY:  Past Medical History:   Diagnosis Date    Atrial fibrillation ([facility]     Cancer (HCC)     prostate    CHF (congestive heart failure) ([facility]     Diabetes mellitus ([facility]     Hyperlipidemia     Hypertension      Past Surgical History:   Procedure Laterality Date    ANKLE SURGERY Left 01/12/2015    ORIF left ankle    PACEMAKER INSERTION N/A        ALLERGIES:  Allergies   Allergen Reactions    Lisinopril        HOME MEDICATIONS:  Prior to Admission medications    Medication Sig

## 2023-04-25 NOTE — DISCHARGE SUMMARY
[facility]  Office: [address], DO, Damian Brannon, DO, Kae Hoover, DO, Isela Gutiérrez Blood, DO, Sergei Jensen MD, Larry Key MD, Van Mahajan MD, Nicola Ledesma MD,  Virgen Kay MD, Brigitte Marx MD, Savannah Chamorro, DO, James Goncalves MD,  Carter Jordan MD, Katie Baltazar MD, Howard Bailey DO, Shereen Herrmann MD, Amado Vences MD, Jaja Edwards DO, Dennys Newsome MD, Chacho Phan MD, Crow Vargas MD, Sadiq Amezquita MD,  Benoit Michael, DO, Viji Johnson MD,  Viridiana Raphael CNP,  Kina Enamorado, CNP, Sade Hernandez, CNP, Cuca Tobias, CNP,  Penelope Mejia, [facility], Manisha Louis, CNP, David Berkowitz, CNP, Allison Dobson, CNP, Shasta Servin, CNP, Curly Rosenthal, CNP, Mindy Abbott PA-C, Bishop Freeman, CNS, Phu Ledbetter, CNP, Sima Barreto, [facility]    Discharge Summary     Patient ID: Tong Dang  :  1950   MRN: [id number]     ACCOUNT:  [de-identified]   Patient's PCP: Krista Zapien MD  Admit Date: 2023   Discharge Date: 2023  Length of Stay: 2  Code Status:  Full Code  Admitting Physician: Katie Baltazar MD  Discharge Physician: Katie Baltazar MD     Active Discharge Diagnoses:     Hospital Problem Lists:  Principal Problem:    Hematochezia  Active Problems:    Hypertension    CHF (congestive heart failure), NYHA class III [facility])    Atrial fibrillation (HCC)    Diabetes mellitus ([facility]    Supratherapeutic INR    Rectal bleeding  Resolved Problems:    * No resolved hospital problems. *      Admission Condition:  good     Discharged Condition: good    Hospital Stay:     Hospital Course:  Tong Dang is a 68 y.o. male with a past medical history of paroxsymal atrial fibrillation (on coumadin), DM2, HTN and HLD who presented to the emergency department on 2023 complaining of rectal bleeding. He states that his symptoms began several days ago and have remained constant.

## 2023-05-08 SDOH — HEALTH STABILITY: PHYSICAL HEALTH: ON AVERAGE, HOW MANY MINUTES DO YOU ENGAGE IN EXERCISE AT THIS LEVEL?: 10 MIN

## 2023-05-08 SDOH — HEALTH STABILITY: PHYSICAL HEALTH: ON AVERAGE, HOW MANY DAYS PER WEEK DO YOU ENGAGE IN MODERATE TO STRENUOUS EXERCISE (LIKE A BRISK WALK)?: 3 DAYS

## 2023-05-08 ASSESSMENT — SOCIAL DETERMINANTS OF HEALTH (SDOH)
WITHIN THE LAST YEAR, HAVE TO BEEN RAPED OR FORCED TO HAVE ANY KIND OF SEXUAL ACTIVITY BY YOUR PARTNER OR EX-PARTNER?: NO
WITHIN THE LAST YEAR, HAVE YOU BEEN HUMILIATED OR EMOTIONALLY ABUSED IN OTHER WAYS BY YOUR PARTNER OR EX-PARTNER?: NO
WITHIN THE LAST YEAR, HAVE YOU BEEN KICKED, HIT, SLAPPED, OR OTHERWISE PHYSICALLY HURT BY YOUR PARTNER OR EX-PARTNER?: NO
WITHIN THE LAST YEAR, HAVE YOU BEEN AFRAID OF YOUR PARTNER OR EX-PARTNER?: NO

## 2023-05-09 ENCOUNTER — OFFICE VISIT (OUTPATIENT)
Dept: PRIMARY CARE CLINIC | Age: 73
End: 2023-05-09
Payer: MEDICARE

## 2023-05-09 VITALS
HEART RATE: 59 BPM | OXYGEN SATURATION: 98 % | HEIGHT: 65 IN | BODY MASS INDEX: 31.29 KG/M2 | WEIGHT: 187.8 LBS | SYSTOLIC BLOOD PRESSURE: 115 MMHG | DIASTOLIC BLOOD PRESSURE: 70 MMHG

## 2023-05-09 DIAGNOSIS — E11.9 TYPE 2 DIABETES MELLITUS WITHOUT COMPLICATION, WITH LONG-TERM CURRENT USE OF INSULIN (HCC): ICD-10-CM

## 2023-05-09 DIAGNOSIS — Z79.4 TYPE 2 DIABETES MELLITUS WITHOUT COMPLICATION, WITH LONG-TERM CURRENT USE OF INSULIN (HCC): ICD-10-CM

## 2023-05-09 DIAGNOSIS — I50.9 CONGESTIVE HEART FAILURE, NYHA CLASS 3, UNSPECIFIED CONGESTIVE HEART FAILURE TYPE (HCC): ICD-10-CM

## 2023-05-09 DIAGNOSIS — K62.5 RECTAL BLEEDING: Primary | ICD-10-CM

## 2023-05-09 DIAGNOSIS — I10 PRIMARY HYPERTENSION: ICD-10-CM

## 2023-05-09 DIAGNOSIS — I48.0 PAROXYSMAL ATRIAL FIBRILLATION (HCC): ICD-10-CM

## 2023-05-09 LAB — HBA1C MFR BLD: 6.7 %

## 2023-05-09 PROCEDURE — 3017F COLORECTAL CA SCREEN DOC REV: CPT | Performed by: PHYSICIAN ASSISTANT

## 2023-05-09 PROCEDURE — 3078F DIAST BP <80 MM HG: CPT | Performed by: PHYSICIAN ASSISTANT

## 2023-05-09 PROCEDURE — 1111F DSCHRG MED/CURRENT MED MERGE: CPT | Performed by: PHYSICIAN ASSISTANT

## 2023-05-09 PROCEDURE — 1123F ACP DISCUSS/DSCN MKR DOCD: CPT | Performed by: PHYSICIAN ASSISTANT

## 2023-05-09 PROCEDURE — G8427 DOCREV CUR MEDS BY ELIG CLIN: HCPCS | Performed by: PHYSICIAN ASSISTANT

## 2023-05-09 PROCEDURE — 2022F DILAT RTA XM EVC RTNOPTHY: CPT | Performed by: PHYSICIAN ASSISTANT

## 2023-05-09 PROCEDURE — 99204 OFFICE O/P NEW MOD 45 MIN: CPT | Performed by: PHYSICIAN ASSISTANT

## 2023-05-09 PROCEDURE — 83036 HEMOGLOBIN GLYCOSYLATED A1C: CPT | Performed by: PHYSICIAN ASSISTANT

## 2023-05-09 PROCEDURE — 3044F HG A1C LEVEL LT 7.0%: CPT | Performed by: PHYSICIAN ASSISTANT

## 2023-05-09 PROCEDURE — G8417 CALC BMI ABV UP PARAM F/U: HCPCS | Performed by: PHYSICIAN ASSISTANT

## 2023-05-09 PROCEDURE — 3074F SYST BP LT 130 MM HG: CPT | Performed by: PHYSICIAN ASSISTANT

## 2023-05-09 PROCEDURE — 1036F TOBACCO NON-USER: CPT | Performed by: PHYSICIAN ASSISTANT

## 2023-05-09 RX ORDER — HYDROCORTISONE ACETATE 25 MG/1
25 SUPPOSITORY RECTAL 2 TIMES DAILY
COMMUNITY

## 2023-05-09 SDOH — ECONOMIC STABILITY: INCOME INSECURITY: HOW HARD IS IT FOR YOU TO PAY FOR THE VERY BASICS LIKE FOOD, HOUSING, MEDICAL CARE, AND HEATING?: NOT HARD AT ALL

## 2023-05-09 SDOH — ECONOMIC STABILITY: HOUSING INSECURITY
IN THE LAST 12 MONTHS, WAS THERE A TIME WHEN YOU DID NOT HAVE A STEADY PLACE TO SLEEP OR SLEPT IN A SHELTER (INCLUDING NOW)?: NO

## 2023-05-09 SDOH — ECONOMIC STABILITY: FOOD INSECURITY: WITHIN THE PAST 12 MONTHS, THE FOOD YOU BOUGHT JUST DIDN'T LAST AND YOU DIDN'T HAVE MONEY TO GET MORE.: NEVER TRUE

## 2023-05-09 SDOH — ECONOMIC STABILITY: FOOD INSECURITY: WITHIN THE PAST 12 MONTHS, YOU WORRIED THAT YOUR FOOD WOULD RUN OUT BEFORE YOU GOT MONEY TO BUY MORE.: NEVER TRUE

## 2023-05-09 ASSESSMENT — PATIENT HEALTH QUESTIONNAIRE - PHQ9
SUM OF ALL RESPONSES TO PHQ QUESTIONS 1-9: 0
1. LITTLE INTEREST OR PLEASURE IN DOING THINGS: 0
2. FEELING DOWN, DEPRESSED OR HOPELESS: 0
SUM OF ALL RESPONSES TO PHQ QUESTIONS 1-9: 0
SUM OF ALL RESPONSES TO PHQ9 QUESTIONS 1 & 2: 0

## 2023-05-09 NOTE — PROGRESS NOTES
3    allopurinol (ZYLOPRIM) 300 MG tablet Take 1 tablet by mouth daily      isosorbide mononitrate (IMDUR) 60 MG CR tablet Take 1 tablet by mouth daily      furosemide (LASIX) 20 MG tablet Take 2 tablets by mouth daily      gabapentin (NEURONTIN) 400 MG capsule Take 600 mg by mouth 3 times daily. warfarin (COUMADIN) 2.5 MG tablet Take 1 tablet by mouth       No current facility-administered medications for this visit. Allergies   Allergen Reactions    Lisinopril        Health Maintenance   Topic Date Due    Diabetic foot exam  Never done    Diabetic Alb to Cr ratio (uACR) test  Never done    Diabetic retinal exam  Never done    Hepatitis C screen  Never done    DTaP/Tdap/Td vaccine (1 - Tdap) Never done    Colorectal Cancer Screen  Never done    Shingles vaccine (1 of 2) Never done    Lipids  01/11/2016    COVID-19 Vaccine (3 - Booster for Pfizer series) 04/07/2022    Annual Wellness Visit (AWV)  04/23/2023    Flu vaccine (Season Ended) 08/01/2023    GFR test (Diabetes, CKD 3-4, OR last GFR 15-59)  04/25/2024    A1C test (Diabetic or Prediabetic)  05/09/2024    Depression Screen  05/09/2024    Pneumococcal 65+ years Vaccine  Completed    AAA screen  Completed    Hepatitis A vaccine  Aged Out    Hib vaccine  Aged Out    Meningococcal (ACWY) vaccine  Aged Out       Subjective:      Review of Systems   Constitutional:  Negative for chills, fatigue and fever. HENT:  Negative for congestion, rhinorrhea and sinus pain. Eyes:  Negative for pain. Respiratory:  Negative for cough and shortness of breath. Cardiovascular:  Negative for chest pain and leg swelling. Gastrointestinal:  Positive for blood in stool. Negative for abdominal pain, constipation, diarrhea, nausea and vomiting. Genitourinary:  Negative for difficulty urinating, enuresis and testicular pain. Musculoskeletal:  Negative for arthralgias, back pain and myalgias. Skin:  Negative for rash.    Neurological:  Negative for dizziness and

## 2023-05-10 ASSESSMENT — ENCOUNTER SYMPTOMS
NAUSEA: 0
BACK PAIN: 0
EYE PAIN: 0
BLOOD IN STOOL: 1
DIARRHEA: 0
RHINORRHEA: 0
COUGH: 0
ABDOMINAL PAIN: 0
VOMITING: 0
SINUS PAIN: 0
SHORTNESS OF BREATH: 0
CONSTIPATION: 0

## 2023-05-11 ENCOUNTER — TELEPHONE (OUTPATIENT)
Dept: GASTROENTEROLOGY | Age: 73
End: 2023-05-11

## 2023-05-11 NOTE — TELEPHONE ENCOUNTER
Patient LVM for a call back to schedule from his referral.  Returned call and informed that he did see RAHEEM Cohen in the hospital on 4/25/23 for rectal bleeding already and it was noted to follow up. Since he has already been seen by the doctor he will follow up with RAHEEM Mckeon's office. Number given of [phone]. Writer thanked and call ended. Referral sent back to the referring provider.

## 2023-06-20 ENCOUNTER — OFFICE VISIT (OUTPATIENT)
Dept: GASTROENTEROLOGY | Age: 73
End: 2023-06-20
Payer: MEDICARE

## 2023-06-20 ENCOUNTER — HOSPITAL ENCOUNTER (OUTPATIENT)
Age: 73
Setting detail: SPECIMEN
Discharge: HOME | End: 2023-06-20

## 2023-06-20 VITALS
DIASTOLIC BLOOD PRESSURE: 67 MMHG | WEIGHT: 189 LBS | HEIGHT: 65 IN | BODY MASS INDEX: 31.49 KG/M2 | HEART RATE: 66 BPM | SYSTOLIC BLOOD PRESSURE: 114 MMHG

## 2023-06-20 DIAGNOSIS — K62.5 RECTAL BLEEDING: ICD-10-CM

## 2023-06-20 DIAGNOSIS — K62.5 RECTAL BLEEDING: Primary | ICD-10-CM

## 2023-06-20 LAB
ANION GAP SERPL CALCULATED.3IONS-SCNC: 13 MMOL/L (ref 9–17)
BASOPHILS # BLD: 0.03 K/UL (ref 0–0.2)
BASOPHILS NFR BLD: 1 % (ref 0–2)
BUN SERPL-MCNC: 25 MG/DL (ref 8–23)
CALCIUM SERPL-MCNC: 10.6 MG/DL (ref 8.6–10.4)
CHLORIDE SERPL-SCNC: 103 MMOL/L (ref 98–107)
CO2 SERPL-SCNC: 23 MMOL/L (ref 20–31)
CREAT SERPL-MCNC: 1.19 MG/DL (ref 0.7–1.2)
EOSINOPHIL # BLD: 0.08 K/UL (ref 0–0.44)
EOSINOPHILS RELATIVE PERCENT: 2 % (ref 1–4)
ERYTHROCYTE [DISTWIDTH] IN BLOOD BY AUTOMATED COUNT: 14.6 % (ref 11.8–14.4)
GFR SERPL CREATININE-BSD FRML MDRD: >60 ML/MIN/1.73M2
GLUCOSE SERPL-MCNC: 203 MG/DL (ref 70–99)
HCT VFR BLD AUTO: 40.9 % (ref 40.7–50.3)
HGB BLD-MCNC: 13.5 G/DL (ref 13–17)
IMM GRANULOCYTES # BLD AUTO: <0.03 K/UL (ref 0–0.3)
IMM GRANULOCYTES NFR BLD: 0 %
INR PPP: 1
LYMPHOCYTES # BLD: 26 % (ref 24–43)
LYMPHOCYTES NFR BLD: 1.22 K/UL (ref 1.1–3.7)
MCH RBC QN AUTO: 31.2 PG (ref 25.2–33.5)
MCHC RBC AUTO-ENTMCNC: 33 G/DL (ref 28.4–34.8)
MCV RBC AUTO: 94.5 FL (ref 82.6–102.9)
MONOCYTES NFR BLD: 0.51 K/UL (ref 0.1–1.2)
MONOCYTES NFR BLD: 11 % (ref 3–12)
NEUTROPHILS NFR BLD: 60 % (ref 36–65)
NEUTS SEG NFR BLD: 2.87 K/UL (ref 1.5–8.1)
NRBC AUTOMATED: 0 PER 100 WBC
PLATELET # BLD AUTO: 235 K/UL (ref 138–453)
PMV BLD AUTO: 11.9 FL (ref 8.1–13.5)
POTASSIUM SERPL-SCNC: 4 MMOL/L (ref 3.7–5.3)
PROTHROMBIN TIME: 13.1 SEC (ref 11.7–14.9)
RBC # BLD AUTO: 4.33 M/UL (ref 4.21–5.77)
RBC # BLD: ABNORMAL 10*6/UL
SODIUM SERPL-SCNC: 139 MMOL/L (ref 135–144)
WBC OTHER # BLD: 4.7 K/UL (ref 3.5–11.3)

## 2023-06-20 PROCEDURE — G8427 DOCREV CUR MEDS BY ELIG CLIN: HCPCS | Performed by: INTERNAL MEDICINE

## 2023-06-20 PROCEDURE — G8417 CALC BMI ABV UP PARAM F/U: HCPCS | Performed by: INTERNAL MEDICINE

## 2023-06-20 PROCEDURE — 3078F DIAST BP <80 MM HG: CPT | Performed by: INTERNAL MEDICINE

## 2023-06-20 PROCEDURE — 1036F TOBACCO NON-USER: CPT | Performed by: INTERNAL MEDICINE

## 2023-06-20 PROCEDURE — 1123F ACP DISCUSS/DSCN MKR DOCD: CPT | Performed by: INTERNAL MEDICINE

## 2023-06-20 PROCEDURE — 3074F SYST BP LT 130 MM HG: CPT | Performed by: INTERNAL MEDICINE

## 2023-06-20 PROCEDURE — 3017F COLORECTAL CA SCREEN DOC REV: CPT | Performed by: INTERNAL MEDICINE

## 2023-06-20 RX ORDER — HYDROCORTISONE ACETATE 25 MG/1
25 SUPPOSITORY RECTAL EVERY 12 HOURS
Qty: 60 SUPPOSITORY | Refills: 1 | Status: SHIPPED | OUTPATIENT
Start: 2023-06-20

## 2023-06-20 ASSESSMENT — ENCOUNTER SYMPTOMS
BLOOD IN STOOL: 1
RECTAL PAIN: 1
ALLERGIC/IMMUNOLOGIC NEGATIVE: 1
RHINORRHEA: 1
SHORTNESS OF BREATH: 1
ANAL BLEEDING: 1
BACK PAIN: 1
EYES NEGATIVE: 1

## 2023-06-20 NOTE — PROGRESS NOTES
HENT:  Positive for rhinorrhea. Eyes: Negative. Respiratory:  Positive for shortness of breath. Cardiovascular: Negative. Gastrointestinal:  Positive for anal bleeding, blood in stool and rectal pain. Endocrine: Negative. Genitourinary: Negative. Musculoskeletal:  Positive for back pain. Skin:  Positive for rash. Allergic/Immunologic: Negative. Neurological:  Positive for dizziness, weakness and light-headedness. Hematological: Negative. Psychiatric/Behavioral: Negative. PHYSICAL EXAMINATION: Vital signs reviewed per the nursing documentation. /67 (Site: Left Upper Arm, Position: Sitting, Cuff Size: Medium Adult)   Pulse 66   Ht 5' 4.5\" (1.638 m)   Wt 189 lb (85.7 kg)   BMI 31.94 kg/m²   Body mass index is 31.94 kg/m². Physical Exam    Physical Exam   Constitutional: Patient is oriented to person, place, and time. Patient appears well-developed and well-nourished. HENT:   Head: Normocephalic and atraumatic. Eyes: Pupils are equal, round, and reactive to light. EOM are normal.   Neck: Normal range of motion. Neck supple. No JVD present. No tracheal deviation present. No thyromegaly present. Cardiovascular: Normal rate, regular rhythm, normal heart sounds and intact distal pulses. Pulmonary/Chest: Effort normal and breath sounds normal. No stridor. No respiratory distress. He has no wheezes. He has no rales. He exhibits no tenderness. Abdominal: Soft. Bowel sounds are normal. He exhibits no distension and no mass. There is no tenderness. There is no rebound and no guarding. No hernia. Musculoskeletal: Normal range of motion. Lymphadenopathy:    Patient has no cervical adenopathy. Neurological: Patient is alert and oriented to person, place, and time. Psychiatric: Patient has a normal mood and affect.  Patient behavior is normal.       LABORATORY DATA: Reviewed  Lab Results   Component Value Date    WBC 4.7 06/20/2023    HGB 13.5 06/20/2023    HCT

## 2023-06-21 ENCOUNTER — TELEPHONE (OUTPATIENT)
Dept: GASTROENTEROLOGY | Age: 73
End: 2023-06-21

## 2023-06-21 RX ORDER — BISACODYL 5 MG/1
TABLET, DELAYED RELEASE ORAL
Qty: 4 TABLET | Refills: 0 | Status: SHIPPED | OUTPATIENT
Start: 2023-06-21

## 2023-06-21 RX ORDER — POLYETHYLENE GLYCOL 3350 17 G/17G
POWDER, FOR SOLUTION ORAL
Qty: 238 G | Refills: 0 | Status: SHIPPED | OUTPATIENT
Start: 2023-06-21

## 2023-06-21 NOTE — TELEPHONE ENCOUNTER
Colonoscopy/Manav MALONEY 7/25/23 @ 10:30am    Written/verbal instructions given @ visit    Miralax/dulcolax

## 2023-07-05 ENCOUNTER — OFFICE VISIT (OUTPATIENT)
Dept: FAMILY MEDICINE CLINIC | Age: 73
End: 2023-07-05
Payer: MEDICARE

## 2023-07-05 ENCOUNTER — HOSPITAL ENCOUNTER (OUTPATIENT)
Age: 73
Discharge: HOME | End: 2023-07-07
Payer: MEDICARE

## 2023-07-05 ENCOUNTER — HOSPITAL ENCOUNTER (OUTPATIENT)
Dept: GENERAL RADIOLOGY | Age: 73
Discharge: HOME | End: 2023-07-07
Payer: MEDICARE

## 2023-07-05 VITALS
RESPIRATION RATE: 16 BRPM | OXYGEN SATURATION: 97 % | WEIGHT: 190 LBS | DIASTOLIC BLOOD PRESSURE: 54 MMHG | BODY MASS INDEX: 33.66 KG/M2 | SYSTOLIC BLOOD PRESSURE: 98 MMHG | HEART RATE: 62 BPM

## 2023-07-05 DIAGNOSIS — L03.116 CELLULITIS OF LEFT LOWER EXTREMITY: Primary | ICD-10-CM

## 2023-07-05 DIAGNOSIS — L03.116 CELLULITIS OF LEFT LOWER EXTREMITY: ICD-10-CM

## 2023-07-05 DIAGNOSIS — M10.9 ACUTE GOUT OF LEFT FOOT, UNSPECIFIED CAUSE: ICD-10-CM

## 2023-07-05 PROCEDURE — 1036F TOBACCO NON-USER: CPT | Performed by: REGISTERED NURSE

## 2023-07-05 PROCEDURE — 3078F DIAST BP <80 MM HG: CPT | Performed by: REGISTERED NURSE

## 2023-07-05 PROCEDURE — 3074F SYST BP LT 130 MM HG: CPT | Performed by: REGISTERED NURSE

## 2023-07-05 PROCEDURE — G8427 DOCREV CUR MEDS BY ELIG CLIN: HCPCS | Performed by: REGISTERED NURSE

## 2023-07-05 PROCEDURE — 3017F COLORECTAL CA SCREEN DOC REV: CPT | Performed by: REGISTERED NURSE

## 2023-07-05 PROCEDURE — G8417 CALC BMI ABV UP PARAM F/U: HCPCS | Performed by: REGISTERED NURSE

## 2023-07-05 PROCEDURE — 99213 OFFICE O/P EST LOW 20 MIN: CPT | Performed by: REGISTERED NURSE

## 2023-07-05 PROCEDURE — 1123F ACP DISCUSS/DSCN MKR DOCD: CPT | Performed by: REGISTERED NURSE

## 2023-07-05 PROCEDURE — 73630 X-RAY EXAM OF FOOT: CPT

## 2023-07-05 RX ORDER — SULFAMETHOXAZOLE AND TRIMETHOPRIM 800; 160 MG/1; MG/1
1 TABLET ORAL 2 TIMES DAILY
Qty: 14 TABLET | Refills: 0 | Status: ON HOLD | OUTPATIENT
Start: 2023-07-05 | End: 2023-07-12

## 2023-07-05 RX ORDER — DOXYCYCLINE HYCLATE 100 MG/1
100 CAPSULE ORAL 2 TIMES DAILY
Qty: 20 CAPSULE | Refills: 0 | Status: SHIPPED | OUTPATIENT
Start: 2023-07-05 | End: 2023-07-05 | Stop reason: ALTCHOICE

## 2023-07-05 RX ORDER — PREDNISONE 20 MG/1
20 TABLET ORAL 2 TIMES DAILY
Qty: 10 TABLET | Refills: 0 | Status: ON HOLD | OUTPATIENT
Start: 2023-07-05 | End: 2023-07-10

## 2023-07-05 RX ORDER — AMOXICILLIN AND CLAVULANATE POTASSIUM 875; 125 MG/1; MG/1
1 TABLET, FILM COATED ORAL 2 TIMES DAILY
Qty: 14 TABLET | Refills: 0 | Status: ON HOLD | OUTPATIENT
Start: 2023-07-05 | End: 2023-07-12

## 2023-07-05 ASSESSMENT — ENCOUNTER SYMPTOMS
GASTROINTESTINAL NEGATIVE: 1
SHORTNESS OF BREATH: 0
EYES NEGATIVE: 1
COLOR CHANGE: 1
WHEEZING: 0

## 2023-07-05 NOTE — PROGRESS NOTES
Lisinopril          Subjective:      Review of Systems   Constitutional:  Positive for chills. Negative for diaphoresis and fever. Eyes: Negative. Respiratory:  Negative for shortness of breath and wheezing. Cardiovascular:  Negative for chest pain and leg swelling. Gastrointestinal: Negative. Skin:  Positive for color change. Neurological: Negative. Objective:     Physical Exam  Constitutional:       General: He is not in acute distress. Appearance: Normal appearance. He is normal weight. He is not ill-appearing, toxic-appearing or diaphoretic. Pulmonary:      Effort: Pulmonary effort is normal.   Musculoskeletal:        Feet:    Feet:      Left foot:      Skin integrity: Erythema and warmth present. No ulcer, blister or skin breakdown. Toenail Condition: Left toenails are normal.   Neurological:      General: No focal deficit present. Mental Status: He is alert and oriented to person, place, and time. MEDICAL DECISION MAKING Assessment/Plan:     Roberto Viveros was seen today for gout. Diagnoses and all orders for this visit:    Cellulitis of left lower extremity  -     Discontinue: doxycycline hyclate (VIBRAMYCIN) 100 MG capsule; Take 1 capsule by mouth 2 times daily for 10 days  -     amoxicillin-clavulanate (AUGMENTIN) 875-125 MG per tablet; Take 1 tablet by mouth 2 times daily for 7 days  -     Hemaaltha Fischer, [facility], Podiatry, [place]  -     XR FOOT LEFT (MIN 3 VIEWS); Future  -     sulfamethoxazole-trimethoprim (BACTRIM DS) 800-160 MG per tablet; Take 1 tablet by mouth 2 times daily for 7 days    Acute gout of left foot, unspecified cause  -     XR FOOT LEFT (MIN 3 VIEWS); Future  -     predniSONE (DELTASONE) 20 MG tablet; Take 1 tablet by mouth 2 times daily for 5 days    Patient presents for left foot erythema, warmth and pain. Concern was gout vs cellulitis. Xray order placed to evaluate for gout attack- prednisone four possible gouty attack prescribed.  Patient

## 2023-07-05 NOTE — RESULT ENCOUNTER NOTE
I called the patient- advised he hold prednisone and take the Augmentin and bactrim DS. Has a podiatry referral, appears he has suspected cellulitis in the left foot. Image uploaded into his chart.

## 2023-07-07 ENCOUNTER — APPOINTMENT (OUTPATIENT)
Dept: GENERAL RADIOLOGY | Age: 73
DRG: 638 | End: 2023-07-07
Payer: MEDICARE

## 2023-07-07 ENCOUNTER — HOSPITAL ENCOUNTER (INPATIENT)
Age: 73
LOS: 4 days | Discharge: HOME HEALTH | DRG: 638 | End: 2023-07-11
Attending: EMERGENCY MEDICINE | Admitting: FAMILY MEDICINE
Payer: MEDICARE

## 2023-07-07 ENCOUNTER — APPOINTMENT (OUTPATIENT)
Dept: CT IMAGING | Age: 73
DRG: 638 | End: 2023-07-07
Payer: MEDICARE

## 2023-07-07 DIAGNOSIS — L03.116 CELLULITIS OF LEFT FOOT: Primary | ICD-10-CM

## 2023-07-07 DIAGNOSIS — Z78.9 FAILURE OF OUTPATIENT TREATMENT: ICD-10-CM

## 2023-07-07 PROBLEM — Z87.891 FORMER SMOKER: Status: ACTIVE | Noted: 2023-07-07

## 2023-07-07 PROBLEM — Z85.46 HISTORY OF PROSTATE CANCER: Status: ACTIVE | Noted: 2023-07-07

## 2023-07-07 PROBLEM — Z79.4 TYPE 2 DIABETES MELLITUS WITH DIABETIC POLYNEUROPATHY, WITH LONG-TERM CURRENT USE OF INSULIN (HCC): Status: ACTIVE | Noted: 2023-07-07

## 2023-07-07 PROBLEM — I25.118 CORONARY ARTERY DISEASE OF NATIVE ARTERY OF NATIVE HEART WITH STABLE ANGINA PECTORIS (HCC): Status: ACTIVE | Noted: 2023-07-07

## 2023-07-07 PROBLEM — L97.512 DIABETIC ULCER OF RIGHT FOOT WITH FAT LAYER EXPOSED (HCC): Status: ACTIVE | Noted: 2023-07-07

## 2023-07-07 PROBLEM — Z95.0 PACEMAKER: Status: ACTIVE | Noted: 2023-07-07

## 2023-07-07 PROBLEM — E78.5 DYSLIPIDEMIA (HIGH LDL; LOW HDL): Status: ACTIVE | Noted: 2023-07-07

## 2023-07-07 PROBLEM — Z87.81 HISTORY OF FRACTURE OF LEFT ANKLE: Status: ACTIVE | Noted: 2023-07-07

## 2023-07-07 PROBLEM — N17.9 ACUTE KIDNEY INJURY (HCC): Status: ACTIVE | Noted: 2023-07-07

## 2023-07-07 PROBLEM — I11.0 BENIGN HYPERTENSIVE HEART DISEASE WITH HEART FAILURE (HCC): Status: ACTIVE | Noted: 2023-07-07

## 2023-07-07 PROBLEM — E11.621 DIABETIC ULCER OF RIGHT FOOT WITH FAT LAYER EXPOSED (HCC): Status: ACTIVE | Noted: 2023-07-07

## 2023-07-07 PROBLEM — E11.42 TYPE 2 DIABETES MELLITUS WITH DIABETIC POLYNEUROPATHY, WITH LONG-TERM CURRENT USE OF INSULIN (HCC): Status: ACTIVE | Noted: 2023-07-07

## 2023-07-07 LAB
ANION GAP SERPL CALCULATED.3IONS-SCNC: 11 MMOL/L (ref 9–17)
BASOPHILS # BLD: 0 K/UL (ref 0–0.2)
BASOPHILS NFR BLD: 0 % (ref 0–2)
BUN SERPL-MCNC: 39 MG/DL (ref 8–23)
CALCIUM SERPL-MCNC: 11.5 MG/DL (ref 8.6–10.4)
CHLORIDE SERPL-SCNC: 101 MMOL/L (ref 98–107)
CO2 SERPL-SCNC: 23 MMOL/L (ref 20–31)
CREAT SERPL-MCNC: 1.71 MG/DL (ref 0.7–1.2)
CRP SERPL HS-MCNC: 158.9 MG/L (ref 0–5)
EOSINOPHIL # BLD: 0 K/UL (ref 0–0.4)
EOSINOPHILS RELATIVE PERCENT: 0 % (ref 1–4)
ERYTHROCYTE [DISTWIDTH] IN BLOOD BY AUTOMATED COUNT: 15.2 % (ref 12.5–15.4)
ERYTHROCYTE [SEDIMENTATION RATE] IN BLOOD BY WESTERGREN METHOD: 57 MM/HR (ref 0–20)
GFR SERPL CREATININE-BSD FRML MDRD: 42 ML/MIN/1.73M2
GLUCOSE BLD-MCNC: 143 MG/DL (ref 75–110)
GLUCOSE SERPL-MCNC: 216 MG/DL (ref 70–99)
HCT VFR BLD AUTO: 35.7 % (ref 41–53)
HGB BLD-MCNC: 11.8 G/DL (ref 13.5–17.5)
LACTATE BLDV-SCNC: 1.4 MMOL/L (ref 0.5–2.2)
LYMPHOCYTES # BLD: 9 % (ref 24–44)
LYMPHOCYTES NFR BLD: 1 K/UL (ref 1–4.8)
MCH RBC QN AUTO: 30.1 PG (ref 26–34)
MCHC RBC AUTO-ENTMCNC: 33 G/DL (ref 31–37)
MCV RBC AUTO: 91.2 FL (ref 80–100)
MONOCYTES NFR BLD: 1.2 K/UL (ref 0.1–1.2)
MONOCYTES NFR BLD: 10 % (ref 2–11)
NEUTROPHILS NFR BLD: 81 % (ref 36–66)
NEUTS SEG NFR BLD: 8.9 K/UL (ref 1.8–7.7)
PLATELET # BLD AUTO: 231 K/UL (ref 140–450)
PMV BLD AUTO: 8.7 FL (ref 6–12)
POTASSIUM SERPL-SCNC: 4 MMOL/L (ref 3.7–5.3)
RBC # BLD AUTO: 3.91 M/UL (ref 4.5–5.9)
SODIUM SERPL-SCNC: 135 MMOL/L (ref 135–144)
WBC OTHER # BLD: 11.2 K/UL (ref 3.5–11)

## 2023-07-07 PROCEDURE — 0HBNXZZ EXCISION OF LEFT FOOT SKIN, EXTERNAL APPROACH: ICD-10-PCS

## 2023-07-07 PROCEDURE — 96365 THER/PROPH/DIAG IV INF INIT: CPT

## 2023-07-07 PROCEDURE — 87075 CULTR BACTERIA EXCEPT BLOOD: CPT

## 2023-07-07 PROCEDURE — 1210000000 HC MED SURG R&B

## 2023-07-07 PROCEDURE — 73620 X-RAY EXAM OF FOOT: CPT

## 2023-07-07 PROCEDURE — 86140 C-REACTIVE PROTEIN: CPT

## 2023-07-07 PROCEDURE — G0378 HOSPITAL OBSERVATION PER HR: HCPCS

## 2023-07-07 PROCEDURE — 97597 DBRDMT OPN WND 1ST 20 CM/<: CPT | Performed by: PODIATRIST

## 2023-07-07 PROCEDURE — 86403 PARTICLE AGGLUT ANTBDY SCRN: CPT

## 2023-07-07 PROCEDURE — 85027 COMPLETE CBC AUTOMATED: CPT

## 2023-07-07 PROCEDURE — 2580000003 HC RX 258

## 2023-07-07 PROCEDURE — 96375 TX/PRO/DX INJ NEW DRUG ADDON: CPT

## 2023-07-07 PROCEDURE — 2580000003 HC RX 258: Performed by: EMERGENCY MEDICINE

## 2023-07-07 PROCEDURE — 6370000000 HC RX 637 (ALT 250 FOR IP)

## 2023-07-07 PROCEDURE — 2580000003 HC RX 258: Performed by: PHYSICIAN ASSISTANT

## 2023-07-07 PROCEDURE — 6360000002 HC RX W HCPCS

## 2023-07-07 PROCEDURE — 6360000002 HC RX W HCPCS: Performed by: EMERGENCY MEDICINE

## 2023-07-07 PROCEDURE — 80048 BASIC METABOLIC PNL TOTAL CA: CPT

## 2023-07-07 PROCEDURE — 99223 1ST HOSP IP/OBS HIGH 75: CPT | Performed by: FAMILY MEDICINE

## 2023-07-07 PROCEDURE — 83605 ASSAY OF LACTIC ACID: CPT

## 2023-07-07 PROCEDURE — 73701 CT LOWER EXTREMITY W/DYE: CPT

## 2023-07-07 PROCEDURE — 36415 COLL VENOUS BLD VENIPUNCTURE: CPT

## 2023-07-07 PROCEDURE — 87205 SMEAR GRAM STAIN: CPT

## 2023-07-07 PROCEDURE — 6370000000 HC RX 637 (ALT 250 FOR IP): Performed by: FAMILY MEDICINE

## 2023-07-07 PROCEDURE — 99285 EMERGENCY DEPT VISIT HI MDM: CPT

## 2023-07-07 PROCEDURE — 6360000004 HC RX CONTRAST MEDICATION: Performed by: EMERGENCY MEDICINE

## 2023-07-07 PROCEDURE — 87070 CULTURE OTHR SPECIMN AEROBIC: CPT

## 2023-07-07 PROCEDURE — 82947 ASSAY GLUCOSE BLOOD QUANT: CPT

## 2023-07-07 PROCEDURE — 99223 1ST HOSP IP/OBS HIGH 75: CPT | Performed by: PODIATRIST

## 2023-07-07 PROCEDURE — 85652 RBC SED RATE AUTOMATED: CPT

## 2023-07-07 PROCEDURE — 87186 SC STD MICRODIL/AGAR DIL: CPT

## 2023-07-07 PROCEDURE — 87040 BLOOD CULTURE FOR BACTERIA: CPT

## 2023-07-07 PROCEDURE — 99223 1ST HOSP IP/OBS HIGH 75: CPT | Performed by: INTERNAL MEDICINE

## 2023-07-07 RX ORDER — ENOXAPARIN SODIUM 100 MG/ML
40 INJECTION SUBCUTANEOUS DAILY
Status: DISCONTINUED | OUTPATIENT
Start: 2023-07-07 | End: 2023-07-11 | Stop reason: HOSPADM

## 2023-07-07 RX ORDER — ONDANSETRON 2 MG/ML
4 INJECTION INTRAMUSCULAR; INTRAVENOUS EVERY 6 HOURS PRN
Status: DISCONTINUED | OUTPATIENT
Start: 2023-07-07 | End: 2023-07-11 | Stop reason: HOSPADM

## 2023-07-07 RX ORDER — HYDROCODONE BITARTRATE AND ACETAMINOPHEN 5; 325 MG/1; MG/1
1 TABLET ORAL EVERY 6 HOURS PRN
Status: DISCONTINUED | OUTPATIENT
Start: 2023-07-07 | End: 2023-07-11 | Stop reason: HOSPADM

## 2023-07-07 RX ORDER — INSULIN GLARGINE 100 [IU]/ML
15 INJECTION, SOLUTION SUBCUTANEOUS NIGHTLY
Status: DISCONTINUED | OUTPATIENT
Start: 2023-07-07 | End: 2023-07-08

## 2023-07-07 RX ORDER — SODIUM CHLORIDE 0.9 % (FLUSH) 0.9 %
10 SYRINGE (ML) INJECTION PRN
Status: DISCONTINUED | OUTPATIENT
Start: 2023-07-07 | End: 2023-07-11 | Stop reason: HOSPADM

## 2023-07-07 RX ORDER — METRONIDAZOLE 500 MG/1
500 TABLET ORAL EVERY 8 HOURS SCHEDULED
Status: DISCONTINUED | OUTPATIENT
Start: 2023-07-07 | End: 2023-07-11

## 2023-07-07 RX ORDER — POLYETHYLENE GLYCOL 3350 17 G/17G
17 POWDER, FOR SOLUTION ORAL DAILY PRN
Status: DISCONTINUED | OUTPATIENT
Start: 2023-07-07 | End: 2023-07-11 | Stop reason: HOSPADM

## 2023-07-07 RX ORDER — 0.9 % SODIUM CHLORIDE 0.9 %
1000 INTRAVENOUS SOLUTION INTRAVENOUS ONCE
Status: COMPLETED | OUTPATIENT
Start: 2023-07-07 | End: 2023-07-07

## 2023-07-07 RX ORDER — CEFEPIME HYDROCHLORIDE 1 G/1
2000 INJECTION, POWDER, FOR SOLUTION INTRAMUSCULAR; INTRAVENOUS EVERY 12 HOURS
Status: DISCONTINUED | OUTPATIENT
Start: 2023-07-07 | End: 2023-07-07

## 2023-07-07 RX ORDER — ACETAMINOPHEN 650 MG/1
650 SUPPOSITORY RECTAL EVERY 6 HOURS PRN
Status: DISCONTINUED | OUTPATIENT
Start: 2023-07-07 | End: 2023-07-11 | Stop reason: HOSPADM

## 2023-07-07 RX ORDER — INSULIN LISPRO 100 [IU]/ML
0-4 INJECTION, SOLUTION INTRAVENOUS; SUBCUTANEOUS
Status: DISCONTINUED | OUTPATIENT
Start: 2023-07-07 | End: 2023-07-09

## 2023-07-07 RX ORDER — HYDROCODONE BITARTRATE AND ACETAMINOPHEN 5; 325 MG/1; MG/1
1 TABLET ORAL ONCE
Status: COMPLETED | OUTPATIENT
Start: 2023-07-07 | End: 2023-07-07

## 2023-07-07 RX ORDER — SODIUM CHLORIDE 0.9 % (FLUSH) 0.9 %
5-40 SYRINGE (ML) INJECTION EVERY 12 HOURS SCHEDULED
Status: DISCONTINUED | OUTPATIENT
Start: 2023-07-07 | End: 2023-07-11 | Stop reason: HOSPADM

## 2023-07-07 RX ORDER — HYDROCODONE BITARTRATE AND ACETAMINOPHEN 5; 325 MG/1; MG/1
2 TABLET ORAL EVERY 6 HOURS PRN
Status: DISCONTINUED | OUTPATIENT
Start: 2023-07-07 | End: 2023-07-11 | Stop reason: HOSPADM

## 2023-07-07 RX ORDER — 0.9 % SODIUM CHLORIDE 0.9 %
80 INTRAVENOUS SOLUTION INTRAVENOUS ONCE
Status: DISCONTINUED | OUTPATIENT
Start: 2023-07-07 | End: 2023-07-11 | Stop reason: HOSPADM

## 2023-07-07 RX ORDER — ACETAMINOPHEN 325 MG/1
650 TABLET ORAL EVERY 6 HOURS PRN
Status: DISCONTINUED | OUTPATIENT
Start: 2023-07-07 | End: 2023-07-11 | Stop reason: HOSPADM

## 2023-07-07 RX ORDER — SODIUM CHLORIDE 9 MG/ML
INJECTION, SOLUTION INTRAVENOUS PRN
Status: DISCONTINUED | OUTPATIENT
Start: 2023-07-07 | End: 2023-07-11 | Stop reason: HOSPADM

## 2023-07-07 RX ORDER — GLUCAGON 1 MG/ML
1 KIT INJECTION PRN
Status: DISCONTINUED | OUTPATIENT
Start: 2023-07-07 | End: 2023-07-11 | Stop reason: HOSPADM

## 2023-07-07 RX ORDER — SODIUM CHLORIDE 0.9 % (FLUSH) 0.9 %
5-40 SYRINGE (ML) INJECTION PRN
Status: DISCONTINUED | OUTPATIENT
Start: 2023-07-07 | End: 2023-07-11 | Stop reason: HOSPADM

## 2023-07-07 RX ORDER — METRONIDAZOLE 500 MG/1
500 TABLET ORAL EVERY 8 HOURS SCHEDULED
Status: DISCONTINUED | OUTPATIENT
Start: 2023-07-07 | End: 2023-07-07

## 2023-07-07 RX ORDER — INSULIN GLARGINE 100 [IU]/ML
20 INJECTION, SOLUTION SUBCUTANEOUS NIGHTLY
Status: DISCONTINUED | OUTPATIENT
Start: 2023-07-07 | End: 2023-07-07

## 2023-07-07 RX ORDER — INSULIN LISPRO 100 [IU]/ML
0-4 INJECTION, SOLUTION INTRAVENOUS; SUBCUTANEOUS NIGHTLY
Status: DISCONTINUED | OUTPATIENT
Start: 2023-07-07 | End: 2023-07-09

## 2023-07-07 RX ORDER — ONDANSETRON 4 MG/1
4 TABLET, ORALLY DISINTEGRATING ORAL EVERY 8 HOURS PRN
Status: DISCONTINUED | OUTPATIENT
Start: 2023-07-07 | End: 2023-07-11 | Stop reason: HOSPADM

## 2023-07-07 RX ORDER — DEXTROSE MONOHYDRATE 100 MG/ML
INJECTION, SOLUTION INTRAVENOUS CONTINUOUS PRN
Status: DISCONTINUED | OUTPATIENT
Start: 2023-07-07 | End: 2023-07-11 | Stop reason: HOSPADM

## 2023-07-07 RX ORDER — SODIUM CHLORIDE 9 MG/ML
INJECTION, SOLUTION INTRAVENOUS CONTINUOUS
Status: DISCONTINUED | OUTPATIENT
Start: 2023-07-07 | End: 2023-07-08

## 2023-07-07 RX ADMIN — SODIUM CHLORIDE, PRESERVATIVE FREE 10 ML: 5 INJECTION INTRAVENOUS at 20:43

## 2023-07-07 RX ADMIN — VANCOMYCIN HYDROCHLORIDE 2250 MG: 750 INJECTION, POWDER, LYOPHILIZED, FOR SOLUTION INTRAVENOUS at 12:38

## 2023-07-07 RX ADMIN — CEFEPIME 2000 MG: 2 INJECTION, POWDER, FOR SOLUTION INTRAVENOUS at 11:30

## 2023-07-07 RX ADMIN — CEFEPIME 2000 MG: 2 INJECTION, POWDER, FOR SOLUTION INTRAVENOUS at 23:31

## 2023-07-07 RX ADMIN — METRONIDAZOLE 500 MG: 500 TABLET ORAL at 16:30

## 2023-07-07 RX ADMIN — INSULIN GLARGINE 15 UNITS: 100 INJECTION, SOLUTION SUBCUTANEOUS at 20:46

## 2023-07-07 RX ADMIN — SODIUM CHLORIDE 1000 ML: 9 INJECTION, SOLUTION INTRAVENOUS at 12:36

## 2023-07-07 RX ADMIN — Medication 80 ML: at 12:14

## 2023-07-07 RX ADMIN — IOPAMIDOL 75 ML: 755 INJECTION, SOLUTION INTRAVENOUS at 12:14

## 2023-07-07 RX ADMIN — SODIUM CHLORIDE: 9 INJECTION, SOLUTION INTRAVENOUS at 20:32

## 2023-07-07 RX ADMIN — SODIUM CHLORIDE, PRESERVATIVE FREE 10 ML: 5 INJECTION INTRAVENOUS at 12:15

## 2023-07-07 RX ADMIN — ENOXAPARIN SODIUM 40 MG: 40 INJECTION SUBCUTANEOUS at 16:34

## 2023-07-07 RX ADMIN — METRONIDAZOLE 500 MG: 500 TABLET ORAL at 23:29

## 2023-07-07 RX ADMIN — HYDROCODONE BITARTRATE AND ACETAMINOPHEN 1 TABLET: 5; 325 TABLET ORAL at 17:01

## 2023-07-07 RX ADMIN — ACETAMINOPHEN 650 MG: 325 TABLET ORAL at 20:54

## 2023-07-07 RX ADMIN — VANCOMYCIN HYDROCHLORIDE 1000 MG: 1 INJECTION, POWDER, LYOPHILIZED, FOR SOLUTION INTRAVENOUS at 20:52

## 2023-07-07 ASSESSMENT — PAIN DESCRIPTION - LOCATION
LOCATION: FOOT
LOCATION: FOOT;GENERALIZED
LOCATION: FOOT

## 2023-07-07 ASSESSMENT — PAIN SCALES - GENERAL
PAINLEVEL_OUTOF10: 3
PAINLEVEL_OUTOF10: 7
PAINLEVEL_OUTOF10: 3

## 2023-07-07 ASSESSMENT — PAIN DESCRIPTION - ORIENTATION
ORIENTATION: LEFT

## 2023-07-07 ASSESSMENT — ENCOUNTER SYMPTOMS
CHEST TIGHTNESS: 0
VOMITING: 0
ABDOMINAL PAIN: 0
COLOR CHANGE: 1
CHOKING: 0
DIARRHEA: 0
SHORTNESS OF BREATH: 0
CONSTIPATION: 0

## 2023-07-07 ASSESSMENT — PAIN DESCRIPTION - DESCRIPTORS
DESCRIPTORS: DISCOMFORT
DESCRIPTORS: HEAVINESS;DISCOMFORT

## 2023-07-07 NOTE — H&P
[facility]) 17 GM/SCOOP powder Take as directed for bowel prep  Patient not taking: Reported on 7/7/2023 6/21/23   Roel Brooke MD   bisacodyl 5 MG EC tablet Take as directed for bowel prep  Patient not taking: Reported on 7/7/2023 6/21/23   Roel Brooke MD   hydrocortisone (ANUSOL-HC) 25 MG suppository Place 1 suppository rectally in the morning and 1 suppository in the evening. Patient not taking: Reported on 7/7/2023 6/20/23   Roel Brooke MD   atorvastatin (LIPITOR) 40 MG tablet Take 1 tablet by mouth nightly    Historical Provider, MD   insulin 70-30 (HUMULIN;NOVOLIN) (70-30) 100 UNIT per ML injection vial Inject 20 Units into the skin 2 times daily    Historical Provider, MD   losartan (COZAAR) 50 MG tablet Take 1 tablet by mouth daily    Historical Provider, MD   nitroGLYCERIN (NITROSTAT) 0.4 MG SL tablet Place 1 tablet under the tongue every 5 minutes as needed for Chest pain up to max of 3 total doses. If no relief after 1 dose, call 911. Historical Provider, MD   TRAMADOL HCL PO Take 30 mg by mouth 3 times daily as needed. Historical Provider, MD   metoprolol succinate (TOPROL XL) 50 MG extended release tablet Take 1 tablet by mouth daily    Historical Provider, MD   tamsulosin (FLOMAX) 0.4 MG capsule Take 1 capsule by mouth daily. Patient taking differently: Take 2 capsules by mouth daily 1/13/15   Lisandra Khan MD   allopurinol (ZYLOPRIM) 300 MG tablet Take 1 tablet by mouth daily    Historical Provider, MD   isosorbide mononitrate (IMDUR) 60 MG CR tablet Take 1 tablet by mouth daily    Historical Provider, MD   furosemide (LASIX) 20 MG tablet Take 2 tablets by mouth daily    Historical Provider, MD   gabapentin (NEURONTIN) 400 MG capsule Take 600 mg by mouth 3 times daily. Historical Provider, MD        Allergies:     Lisinopril    Review of Systems:   Review of Systems - Review of Systems   Constitutional:  Positive for chills, diaphoresis and fever (subjective).    Respiratory:  Negative

## 2023-07-08 LAB
ANION GAP SERPL CALCULATED.3IONS-SCNC: 8 MMOL/L (ref 9–17)
BASOPHILS # BLD: 0 K/UL (ref 0–0.2)
BASOPHILS NFR BLD: 1 % (ref 0–2)
BUN SERPL-MCNC: 25 MG/DL (ref 8–23)
CALCIUM SERPL-MCNC: 10.4 MG/DL (ref 8.6–10.4)
CHLORIDE SERPL-SCNC: 107 MMOL/L (ref 98–107)
CO2 SERPL-SCNC: 21 MMOL/L (ref 20–31)
CREAT SERPL-MCNC: 1.19 MG/DL (ref 0.7–1.2)
CRP SERPL HS-MCNC: 137.5 MG/L (ref 0–5)
EOSINOPHIL # BLD: 0.1 K/UL (ref 0–0.4)
EOSINOPHILS RELATIVE PERCENT: 2 % (ref 1–4)
ERYTHROCYTE [DISTWIDTH] IN BLOOD BY AUTOMATED COUNT: 15.4 % (ref 12.5–15.4)
ERYTHROCYTE [SEDIMENTATION RATE] IN BLOOD BY WESTERGREN METHOD: 38 MM/HR (ref 0–20)
GFR SERPL CREATININE-BSD FRML MDRD: >60 ML/MIN/1.73M2
GLUCOSE BLD-MCNC: 190 MG/DL (ref 75–110)
GLUCOSE BLD-MCNC: 206 MG/DL (ref 75–110)
GLUCOSE BLD-MCNC: 237 MG/DL (ref 75–110)
GLUCOSE SERPL-MCNC: 133 MG/DL (ref 70–99)
HCT VFR BLD AUTO: 35 % (ref 41–53)
HGB BLD-MCNC: 11.3 G/DL (ref 13.5–17.5)
LYMPHOCYTES # BLD: 12 % (ref 24–44)
LYMPHOCYTES NFR BLD: 0.7 K/UL (ref 1–4.8)
MCH RBC QN AUTO: 29.9 PG (ref 26–34)
MCHC RBC AUTO-ENTMCNC: 32.1 G/DL (ref 31–37)
MCV RBC AUTO: 93.2 FL (ref 80–100)
MONOCYTES NFR BLD: 0.7 K/UL (ref 0.1–1.2)
MONOCYTES NFR BLD: 11 % (ref 2–11)
NEUTROPHILS NFR BLD: 74 % (ref 36–66)
NEUTS SEG NFR BLD: 4.7 K/UL (ref 1.8–7.7)
PLATELET # BLD AUTO: 212 K/UL (ref 140–450)
PMV BLD AUTO: 8.3 FL (ref 6–12)
POTASSIUM SERPL-SCNC: 4.3 MMOL/L (ref 3.7–5.3)
RBC # BLD AUTO: 3.76 M/UL (ref 4.5–5.9)
SODIUM SERPL-SCNC: 136 MMOL/L (ref 135–144)
WBC OTHER # BLD: 6.2 K/UL (ref 3.5–11)

## 2023-07-08 PROCEDURE — 6370000000 HC RX 637 (ALT 250 FOR IP)

## 2023-07-08 PROCEDURE — 1200000000 HC SEMI PRIVATE

## 2023-07-08 PROCEDURE — 85027 COMPLETE CBC AUTOMATED: CPT

## 2023-07-08 PROCEDURE — 2580000003 HC RX 258

## 2023-07-08 PROCEDURE — 6360000002 HC RX W HCPCS

## 2023-07-08 PROCEDURE — 86140 C-REACTIVE PROTEIN: CPT

## 2023-07-08 PROCEDURE — 80048 BASIC METABOLIC PNL TOTAL CA: CPT

## 2023-07-08 PROCEDURE — 85652 RBC SED RATE AUTOMATED: CPT

## 2023-07-08 PROCEDURE — 93005 ELECTROCARDIOGRAM TRACING: CPT

## 2023-07-08 PROCEDURE — 36415 COLL VENOUS BLD VENIPUNCTURE: CPT

## 2023-07-08 PROCEDURE — 82947 ASSAY GLUCOSE BLOOD QUANT: CPT

## 2023-07-08 PROCEDURE — 99232 SBSQ HOSP IP/OBS MODERATE 35: CPT | Performed by: FAMILY MEDICINE

## 2023-07-08 RX ORDER — GABAPENTIN 300 MG/1
300 CAPSULE ORAL 3 TIMES DAILY
Status: DISCONTINUED | OUTPATIENT
Start: 2023-07-08 | End: 2023-07-11 | Stop reason: HOSPADM

## 2023-07-08 RX ORDER — ATORVASTATIN CALCIUM 40 MG/1
40 TABLET, FILM COATED ORAL NIGHTLY
Status: DISCONTINUED | OUTPATIENT
Start: 2023-07-08 | End: 2023-07-11 | Stop reason: HOSPADM

## 2023-07-08 RX ORDER — INSULIN GLARGINE 100 [IU]/ML
18 INJECTION, SOLUTION SUBCUTANEOUS NIGHTLY
Status: DISCONTINUED | OUTPATIENT
Start: 2023-07-08 | End: 2023-07-09

## 2023-07-08 RX ORDER — LOSARTAN POTASSIUM 50 MG/1
50 TABLET ORAL DAILY
Status: DISCONTINUED | OUTPATIENT
Start: 2023-07-08 | End: 2023-07-11 | Stop reason: HOSPADM

## 2023-07-08 RX ORDER — ALLOPURINOL 300 MG/1
300 TABLET ORAL DAILY
Status: DISCONTINUED | OUTPATIENT
Start: 2023-07-08 | End: 2023-07-11 | Stop reason: HOSPADM

## 2023-07-08 RX ORDER — METOPROLOL SUCCINATE 50 MG/1
50 TABLET, EXTENDED RELEASE ORAL DAILY
Status: DISCONTINUED | OUTPATIENT
Start: 2023-07-08 | End: 2023-07-11 | Stop reason: HOSPADM

## 2023-07-08 RX ORDER — ISOSORBIDE MONONITRATE 30 MG/1
60 TABLET, EXTENDED RELEASE ORAL DAILY
Status: DISCONTINUED | OUTPATIENT
Start: 2023-07-08 | End: 2023-07-11 | Stop reason: HOSPADM

## 2023-07-08 RX ORDER — FUROSEMIDE 20 MG/1
40 TABLET ORAL DAILY
Status: DISCONTINUED | OUTPATIENT
Start: 2023-07-08 | End: 2023-07-11 | Stop reason: HOSPADM

## 2023-07-08 RX ORDER — NITROGLYCERIN 0.4 MG/1
0.4 TABLET SUBLINGUAL EVERY 5 MIN PRN
Status: DISCONTINUED | OUTPATIENT
Start: 2023-07-08 | End: 2023-07-11 | Stop reason: HOSPADM

## 2023-07-08 RX ADMIN — CEFEPIME 2000 MG: 2 INJECTION, POWDER, FOR SOLUTION INTRAVENOUS at 23:54

## 2023-07-08 RX ADMIN — CEFEPIME 2000 MG: 2 INJECTION, POWDER, FOR SOLUTION INTRAVENOUS at 11:48

## 2023-07-08 RX ADMIN — ACETAMINOPHEN 650 MG: 325 TABLET ORAL at 23:55

## 2023-07-08 RX ADMIN — METRONIDAZOLE 500 MG: 500 TABLET ORAL at 05:51

## 2023-07-08 RX ADMIN — SODIUM CHLORIDE, PRESERVATIVE FREE 10 ML: 5 INJECTION INTRAVENOUS at 09:11

## 2023-07-08 RX ADMIN — ACETAMINOPHEN 650 MG: 325 TABLET ORAL at 09:40

## 2023-07-08 RX ADMIN — METRONIDAZOLE 500 MG: 500 TABLET ORAL at 17:24

## 2023-07-08 RX ADMIN — INSULIN GLARGINE 18 UNITS: 100 INJECTION, SOLUTION SUBCUTANEOUS at 21:22

## 2023-07-08 RX ADMIN — SODIUM CHLORIDE: 9 INJECTION, SOLUTION INTRAVENOUS at 09:44

## 2023-07-08 RX ADMIN — GABAPENTIN 300 MG: 300 CAPSULE ORAL at 20:07

## 2023-07-08 RX ADMIN — SODIUM CHLORIDE, PRESERVATIVE FREE 10 ML: 5 INJECTION INTRAVENOUS at 20:00

## 2023-07-08 RX ADMIN — ATORVASTATIN CALCIUM 40 MG: 40 TABLET, FILM COATED ORAL at 20:07

## 2023-07-08 RX ADMIN — HYDROCODONE BITARTRATE AND ACETAMINOPHEN 1 TABLET: 5; 325 TABLET ORAL at 17:25

## 2023-07-08 RX ADMIN — METRONIDAZOLE 500 MG: 500 TABLET ORAL at 23:52

## 2023-07-08 RX ADMIN — VANCOMYCIN HYDROCHLORIDE 1000 MG: 1 INJECTION, POWDER, LYOPHILIZED, FOR SOLUTION INTRAVENOUS at 19:53

## 2023-07-08 RX ADMIN — ENOXAPARIN SODIUM 40 MG: 40 INJECTION SUBCUTANEOUS at 09:11

## 2023-07-08 RX ADMIN — INSULIN LISPRO 1 UNITS: 100 INJECTION, SOLUTION INTRAVENOUS; SUBCUTANEOUS at 11:48

## 2023-07-08 ASSESSMENT — PAIN DESCRIPTION - LOCATION
LOCATION: FOOT

## 2023-07-08 ASSESSMENT — PAIN SCALES - GENERAL
PAINLEVEL_OUTOF10: 5
PAINLEVEL_OUTOF10: 2
PAINLEVEL_OUTOF10: 1
PAINLEVEL_OUTOF10: 4
PAINLEVEL_OUTOF10: 4

## 2023-07-08 ASSESSMENT — PAIN DESCRIPTION - ORIENTATION
ORIENTATION: LEFT
ORIENTATION: LEFT
ORIENTATION: LEFT;RIGHT

## 2023-07-08 ASSESSMENT — PAIN DESCRIPTION - DESCRIPTORS
DESCRIPTORS: DISCOMFORT
DESCRIPTORS: DISCOMFORT
DESCRIPTORS: ACHING

## 2023-07-08 NOTE — PROGRESS NOTES
(postdebridement):   Full thickness ulcer #1 noted to the 3rd interspace between the 3rd and 4th digits and measures approximately 0.8cm x 0.9cm x 2.8cm. Base is fibrotic. Periwound skin is erythematous. Significant purulent drainage noted with no associated mal odor. Erythema appreciated with  associated increase in warmth. Does not probe to bone, sinus track, or undermine. No fluctuance, crepitus, or induration. Interdigital maceration absent. Full thickness ulcer #2 noted to the plantar aspect of the of the left forefoot and measures approximately 0.7cm x 0.8cm x 0.8cm. Base is fibrotic. Periwound skin is erythematous. No active drainage noted with no associated mal odor. Erythema is appreciated with  associated increase in warmth. Does not probe to bone, sinus track, or undermine. No fluctuance, crepitus, or induration. Interdigital maceration absent. Full thickness ulcer #3 noted to the right plantar foot and measures approximately 0.5cm x 0.8cm x 0.4cm. Base is fibrogranular. Periwound skin is hyoerkeratotic. No drainage noted with no associated mal odor. Does not probe to bone, sinus track, or undermine. No fluctuance, crepitus, or induration. Interdigital maceration absent. Clinical:                 Imaging:   XR FOOT RIGHT (2 VIEWS)   Final Result   No acute fracture or dislocation. No osseous erosion. CT FOOT LEFT W CONTRAST   Final Result   1. Mild cellulitis of the ankle and foot. No organized fluid collection. No   obvious ulceration or soft tissue defect evident. 2. Sequela of remote trimalleolar fractures. ORIF of a distal fibular   fracture with lateral sideplate and screws. Minimal osseous bridging of the   fracture line of the essentially nonunited medial malleolus fracture. 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 3. No aggressive osseous destruction.

## 2023-07-08 NOTE — ED PROVIDER NOTES
[facility]  eMERGENCY dEPARTMENT eNCOUnter      Pt Name: Nori Arredondo  MRN: [id number]  [address] [facility] [place] 1950  Date of evaluation: 7/7/2023  Provider: Quentin Santiago PA-C    CHIEF COMPLAINT       Chief Complaint   Patient presents with    Foot Pain           HISTORY OF PRESENT ILLNESS  (Location/Symptom, Timing/Onset, Context/Setting, Quality, Duration, Modifying Factors, Severity.)   Nori Arredondo is a 68 y.o. male who presents to the emergency department with his son with complaints of worsening flexion to his left foot. Patient was seen 2 days ago at walk-in clinic, he was placed on Augmentin and Bactrim for left foot infection. He states that today he noticed blister formation and worsening of the erythema. He also states that he has some chills but no fever. He denies any nausea or vomiting, denies any chest pain or shortness of breath. Patient is a diabetic      Nursing Notes were reviewed. REVIEW OF SYSTEMS    (2-9 systems for level 4, 10 or more for level 5)     Review of Systems   See hpi    Except as noted above the remainder of the review of systems was reviewed and negative.        PAST MEDICAL HISTORY     Past Medical History:   Diagnosis Date    Atrial fibrillation ([address])     Cancer ([address])     prostate    CHF (congestive heart failure) ([address])     Closed bimalleolar fracture of left ankle 2/18/2015    Diabetes mellitus ([address])     Hyperlipidemia     Hypertension      None otherwise stated in nurses notes    SURGICAL HISTORY       Past Surgical History:   Procedure Laterality Date    ANKLE SURGERY Left 01/12/2015    ORIF left ankle    PACEMAKER INSERTION N/A      None otherwise stated in nurses notes    CURRENT MEDICATIONS       Previous Medications    ALLOPURINOL (ZYLOPRIM) 300 MG TABLET    Take 1 tablet by mouth daily    AMOXICILLIN-CLAVULANATE (AUGMENTIN) 875-125 MG PER TABLET    Take 1 tablet by mouth 2 times daily for 7 days    ATORVASTATIN (LIPITOR) 40 MG
CONTRAST   Final Result   1. Mild cellulitis of the ankle and foot. No organized fluid collection. No   obvious ulceration or soft tissue defect evident. 2. Sequela of remote trimalleolar fractures. ORIF of a distal fibular   fracture with lateral sideplate and screws. Minimal osseous bridging of the   fracture line of the essentially nonunited medial malleolus fracture. 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 3. No aggressive osseous destruction. LABS:  Results for orders placed or performed during the hospital encounter of 07/07/23   Culture, Blood 1    Specimen: Blood   Result Value Ref Range    Specimen Description . BLOOD     Special Requests LEFT AC 20CC     Culture NO GROWTH <24 HRS    Culture, Blood 1    Specimen: Blood   Result Value Ref Range    Specimen Description . BLOOD     Special Requests RIGHT HAND 4CC GREEN CONTAINER ONLY     Culture NO GROWTH <24 HRS    CBC with Auto Differential   Result Value Ref Range    WBC 11.2 (H) 3.5 - 11.0 k/uL    RBC 3.91 (L) 4.5 - 5.9 m/uL    Hemoglobin 11.8 (L) 13.5 - 17.5 g/dL    Hematocrit 35.7 (L) 41 - 53 %    MCV 91.2 80 - 100 fL    MCH 30.1 26 - 34 pg    MCHC 33.0 31 - 37 g/dL    RDW 15.2 12.5 - 15.4 %    Platelets 706 622 - 305 k/uL    MPV 8.7 6.0 - 12.0 fL    Seg Neutrophils 81 (H) 36 - 66 %    Lymphocytes 9 (L) 24 - 44 %    Monocytes 10 2 - 11 %    Eosinophils % 0 (L) 1 - 4 %    Basophils 0 0 - 2 %    Segs Absolute 8.90 (H) 1.8 - 7.7 k/uL    Absolute Lymph # 1.00 1.0 - 4.8 k/uL    Absolute Mono # 1.20 0.1 - 1.2 k/uL    Absolute Eos # 0.00 0.0 - 0.4 k/uL    Basophils Absolute 0.00 0.0 - 0.2 k/uL   Basic Metabolic Panel   Result Value Ref Range    Glucose 216 (H) 70 - 99 mg/dL    BUN 39 (H) 8 - 23 mg/dL    Creatinine 1.71 (H) 0.70 - 1.20 mg/dL    Est, Glom Filt Rate 42 (L) >60 mL/min/1.73m2    Calcium 11.5 (H) 8.6 - 10.4 mg/dL    Sodium 135 135 - 144 mmol/L

## 2023-07-08 NOTE — PLAN OF CARE
Problem: Discharge Planning  Goal: Discharge to home or other facility with appropriate resources  Outcome: Progressing  Flowsheets (Taken 7/7/2023 2121)  Discharge to home or other facility with appropriate resources:   Identify barriers to discharge with patient and caregiver   Arrange for needed discharge resources and transportation as appropriate   Identify discharge learning needs (meds, wound care, etc)     Problem: Pain  Goal: Verbalizes/displays adequate comfort level or baseline comfort level  Outcome: Progressing     Problem: ABCDS Injury Assessment  Goal: Absence of physical injury  Outcome: Progressing     Problem: Skin/Tissue Integrity  Goal: Absence of new skin breakdown  Description: 1. Monitor for areas of redness and/or skin breakdown  2. Assess vascular access sites hourly  3. Every 4-6 hours minimum:  Change oxygen saturation probe site  4. Every 4-6 hours:  If on nasal continuous positive airway pressure, respiratory therapy assess nares and determine need for appliance change or resting period.   Outcome: Progressing     Problem: Safety - Adult  Goal: Free from fall injury  Outcome: Progressing

## 2023-07-09 PROBLEM — L02.612 ABSCESS OF LEFT FOOT: Status: ACTIVE | Noted: 2023-07-09

## 2023-07-09 LAB
ANION GAP SERPL CALCULATED.3IONS-SCNC: 9 MMOL/L (ref 9–17)
BASOPHILS # BLD: 0 K/UL (ref 0–0.2)
BASOPHILS NFR BLD: 0 % (ref 0–2)
BUN SERPL-MCNC: 23 MG/DL (ref 8–23)
CALCIUM SERPL-MCNC: 10.1 MG/DL (ref 8.6–10.4)
CHLORIDE SERPL-SCNC: 107 MMOL/L (ref 98–107)
CO2 SERPL-SCNC: 19 MMOL/L (ref 20–31)
CREAT SERPL-MCNC: 1.17 MG/DL (ref 0.7–1.2)
CRP SERPL HS-MCNC: 93.9 MG/L (ref 0–5)
EOSINOPHIL # BLD: 0.1 K/UL (ref 0–0.4)
EOSINOPHILS RELATIVE PERCENT: 3 % (ref 1–4)
ERYTHROCYTE [DISTWIDTH] IN BLOOD BY AUTOMATED COUNT: 15.4 % (ref 12.5–15.4)
ERYTHROCYTE [SEDIMENTATION RATE] IN BLOOD BY WESTERGREN METHOD: 55 MM/HR (ref 0–20)
GFR SERPL CREATININE-BSD FRML MDRD: >60 ML/MIN/1.73M2
GLUCOSE BLD-MCNC: 170 MG/DL (ref 75–110)
GLUCOSE BLD-MCNC: 188 MG/DL (ref 75–110)
GLUCOSE BLD-MCNC: 216 MG/DL (ref 75–110)
GLUCOSE BLD-MCNC: 238 MG/DL (ref 75–110)
GLUCOSE SERPL-MCNC: 210 MG/DL (ref 70–99)
HCT VFR BLD AUTO: 36.7 % (ref 41–53)
HGB BLD-MCNC: 11.9 G/DL (ref 13.5–17.5)
LYMPHOCYTES # BLD: 11 % (ref 24–44)
LYMPHOCYTES NFR BLD: 0.6 K/UL (ref 1–4.8)
MCH RBC QN AUTO: 29.8 PG (ref 26–34)
MCHC RBC AUTO-ENTMCNC: 32.5 G/DL (ref 31–37)
MCV RBC AUTO: 91.6 FL (ref 80–100)
MONOCYTES NFR BLD: 0.6 K/UL (ref 0.1–1.2)
MONOCYTES NFR BLD: 11 % (ref 2–11)
NEUTROPHILS NFR BLD: 75 % (ref 36–66)
NEUTS SEG NFR BLD: 4.2 K/UL (ref 1.8–7.7)
PLATELET # BLD AUTO: 235 K/UL (ref 140–450)
PMV BLD AUTO: 8.2 FL (ref 6–12)
POTASSIUM SERPL-SCNC: 4.2 MMOL/L (ref 3.7–5.3)
RBC # BLD AUTO: 4.01 M/UL (ref 4.5–5.9)
SODIUM SERPL-SCNC: 135 MMOL/L (ref 135–144)
VANCOMYCIN SERPL-MCNC: 12.1 UG/ML
WBC OTHER # BLD: 5.5 K/UL (ref 3.5–11)

## 2023-07-09 PROCEDURE — 85027 COMPLETE CBC AUTOMATED: CPT

## 2023-07-09 PROCEDURE — 36415 COLL VENOUS BLD VENIPUNCTURE: CPT

## 2023-07-09 PROCEDURE — 2580000003 HC RX 258

## 2023-07-09 PROCEDURE — 99232 SBSQ HOSP IP/OBS MODERATE 35: CPT | Performed by: FAMILY MEDICINE

## 2023-07-09 PROCEDURE — 85652 RBC SED RATE AUTOMATED: CPT

## 2023-07-09 PROCEDURE — 82947 ASSAY GLUCOSE BLOOD QUANT: CPT

## 2023-07-09 PROCEDURE — 6370000000 HC RX 637 (ALT 250 FOR IP)

## 2023-07-09 PROCEDURE — 99223 1ST HOSP IP/OBS HIGH 75: CPT | Performed by: INTERNAL MEDICINE

## 2023-07-09 PROCEDURE — 6360000002 HC RX W HCPCS

## 2023-07-09 PROCEDURE — 2580000003 HC RX 258: Performed by: EMERGENCY MEDICINE

## 2023-07-09 PROCEDURE — 80048 BASIC METABOLIC PNL TOTAL CA: CPT

## 2023-07-09 PROCEDURE — 86140 C-REACTIVE PROTEIN: CPT

## 2023-07-09 PROCEDURE — 80202 ASSAY OF VANCOMYCIN: CPT

## 2023-07-09 PROCEDURE — 6370000000 HC RX 637 (ALT 250 FOR IP): Performed by: STUDENT IN AN ORGANIZED HEALTH CARE EDUCATION/TRAINING PROGRAM

## 2023-07-09 PROCEDURE — 1200000000 HC SEMI PRIVATE

## 2023-07-09 RX ORDER — INSULIN GLARGINE 100 [IU]/ML
12 INJECTION, SOLUTION SUBCUTANEOUS NIGHTLY
Status: DISCONTINUED | OUTPATIENT
Start: 2023-07-09 | End: 2023-07-10

## 2023-07-09 RX ORDER — ACETAMINOPHEN 650 MG
TABLET, EXTENDED RELEASE ORAL PRN
Status: DISCONTINUED | OUTPATIENT
Start: 2023-07-09 | End: 2023-07-11 | Stop reason: HOSPADM

## 2023-07-09 RX ORDER — INSULIN LISPRO 100 [IU]/ML
0-8 INJECTION, SOLUTION INTRAVENOUS; SUBCUTANEOUS
Status: DISCONTINUED | OUTPATIENT
Start: 2023-07-09 | End: 2023-07-11 | Stop reason: HOSPADM

## 2023-07-09 RX ORDER — GUAIFENESIN AND DEXTROMETHORPHAN HYDROBROMIDE 100; 10 MG/5ML; MG/5ML
10 SOLUTION ORAL EVERY 4 HOURS PRN
Status: DISPENSED | OUTPATIENT
Start: 2023-07-09 | End: 2023-07-10

## 2023-07-09 RX ORDER — DEXTROMETHORPHAN POLISTIREX 30 MG/5ML
15 SUSPENSION ORAL EVERY 12 HOURS PRN
Status: DISCONTINUED | OUTPATIENT
Start: 2023-07-09 | End: 2023-07-09

## 2023-07-09 RX ORDER — INSULIN LISPRO 100 [IU]/ML
0-4 INJECTION, SOLUTION INTRAVENOUS; SUBCUTANEOUS NIGHTLY
Status: DISCONTINUED | OUTPATIENT
Start: 2023-07-09 | End: 2023-07-11 | Stop reason: HOSPADM

## 2023-07-09 RX ADMIN — SODIUM CHLORIDE, PRESERVATIVE FREE 10 ML: 5 INJECTION INTRAVENOUS at 20:31

## 2023-07-09 RX ADMIN — CEFEPIME 2000 MG: 2 INJECTION, POWDER, FOR SOLUTION INTRAVENOUS at 22:57

## 2023-07-09 RX ADMIN — ENOXAPARIN SODIUM 40 MG: 40 INJECTION SUBCUTANEOUS at 09:35

## 2023-07-09 RX ADMIN — METRONIDAZOLE 500 MG: 500 TABLET ORAL at 17:33

## 2023-07-09 RX ADMIN — HYDROCODONE BITARTRATE AND ACETAMINOPHEN 2 TABLET: 5; 325 TABLET ORAL at 22:54

## 2023-07-09 RX ADMIN — SODIUM CHLORIDE, PRESERVATIVE FREE 10 ML: 5 INJECTION INTRAVENOUS at 09:39

## 2023-07-09 RX ADMIN — CEFEPIME 2000 MG: 2 INJECTION, POWDER, FOR SOLUTION INTRAVENOUS at 12:17

## 2023-07-09 RX ADMIN — HYDROCODONE BITARTRATE AND ACETAMINOPHEN 2 TABLET: 5; 325 TABLET ORAL at 03:22

## 2023-07-09 RX ADMIN — GUAIFENESIN SYRUP AND DEXTROMETHORPHAN 10 ML: 100; 10 SYRUP ORAL at 20:30

## 2023-07-09 RX ADMIN — GABAPENTIN 300 MG: 300 CAPSULE ORAL at 20:31

## 2023-07-09 RX ADMIN — METOPROLOL SUCCINATE 50 MG: 50 TABLET, EXTENDED RELEASE ORAL at 09:37

## 2023-07-09 RX ADMIN — SODIUM CHLORIDE, PRESERVATIVE FREE 10 ML: 5 INJECTION INTRAVENOUS at 12:14

## 2023-07-09 RX ADMIN — VANCOMYCIN HYDROCHLORIDE 750 MG: 750 INJECTION, POWDER, LYOPHILIZED, FOR SOLUTION INTRAVENOUS at 13:00

## 2023-07-09 RX ADMIN — METRONIDAZOLE 500 MG: 500 TABLET ORAL at 09:38

## 2023-07-09 RX ADMIN — LOSARTAN POTASSIUM 50 MG: 50 TABLET, FILM COATED ORAL at 09:37

## 2023-07-09 RX ADMIN — INSULIN LISPRO 1 UNITS: 100 INJECTION, SOLUTION INTRAVENOUS; SUBCUTANEOUS at 12:18

## 2023-07-09 RX ADMIN — INSULIN GLARGINE 12 UNITS: 100 INJECTION, SOLUTION SUBCUTANEOUS at 21:11

## 2023-07-09 RX ADMIN — GABAPENTIN 300 MG: 300 CAPSULE ORAL at 09:36

## 2023-07-09 RX ADMIN — ATORVASTATIN CALCIUM 40 MG: 40 TABLET, FILM COATED ORAL at 20:31

## 2023-07-09 RX ADMIN — HYDROCODONE BITARTRATE AND ACETAMINOPHEN 2 TABLET: 5; 325 TABLET ORAL at 08:23

## 2023-07-09 RX ADMIN — GABAPENTIN 300 MG: 300 CAPSULE ORAL at 14:28

## 2023-07-09 RX ADMIN — ISOSORBIDE MONONITRATE 60 MG: 30 TABLET, EXTENDED RELEASE ORAL at 09:36

## 2023-07-09 RX ADMIN — ALLOPURINOL 300 MG: 300 TABLET ORAL at 09:37

## 2023-07-09 ASSESSMENT — PAIN DESCRIPTION - ORIENTATION
ORIENTATION: LEFT;RIGHT
ORIENTATION: RIGHT;LEFT

## 2023-07-09 ASSESSMENT — PAIN SCALES - GENERAL
PAINLEVEL_OUTOF10: 4
PAINLEVEL_OUTOF10: 7

## 2023-07-09 ASSESSMENT — PAIN DESCRIPTION - DESCRIPTORS
DESCRIPTORS: ACHING;DISCOMFORT
DESCRIPTORS: DISCOMFORT

## 2023-07-09 ASSESSMENT — PAIN DESCRIPTION - LOCATION
LOCATION: FOOT
LOCATION: FOOT

## 2023-07-09 NOTE — PLAN OF CARE
Problem: Discharge Planning  Goal: Discharge to home or other facility with appropriate resources  Outcome: Progressing  Flowsheets (Taken 7/8/2023 2000)  Discharge to home or other facility with appropriate resources: Identify barriers to discharge with patient and caregiver     Problem: Pain  Goal: Verbalizes/displays adequate comfort level or baseline comfort level  Outcome: Progressing  Flowsheets (Taken 7/8/2023 1945)  Verbalizes/displays adequate comfort level or baseline comfort level: Encourage patient to monitor pain and request assistance     Problem: ABCDS Injury Assessment  Goal: Absence of physical injury  Outcome: Progressing     Problem: Skin/Tissue Integrity  Goal: Absence of new skin breakdown  Description: 1. Monitor for areas of redness and/or skin breakdown  2. Assess vascular access sites hourly  3. Every 4-6 hours minimum:  Change oxygen saturation probe site  4. Every 4-6 hours:  If on nasal continuous positive airway pressure, respiratory therapy assess nares and determine need for appliance change or resting period.   Outcome: Progressing     Problem: Safety - Adult  Goal: Free from fall injury  Outcome: Progressing     Problem: Chronic Conditions and Co-morbidities  Goal: Patient's chronic conditions and co-morbidity symptoms are monitored and maintained or improved  Outcome: Progressing  Flowsheets (Taken 7/8/2023 2000)  Care Plan - Patient's Chronic Conditions and Co-Morbidity Symptoms are Monitored and Maintained or Improved: Monitor and assess patient's chronic conditions and comorbid symptoms for stability, deterioration, or improvement

## 2023-07-09 NOTE — PLAN OF CARE
Problem: Discharge Planning  Goal: Discharge to home or other facility with appropriate resources  7/9/2023 1701 by Sergio Weiss RN  Outcome: Progressing     Problem: Pain  Goal: Verbalizes/displays adequate comfort level or baseline comfort level  7/9/2023 1701 by Sergio Weiss RN  Outcome: Progressing     Problem: ABCDS Injury Assessment  Goal: Absence of physical injury  7/9/2023 1701 by Sergio Weiss RN  Outcome: Progressing     Problem: Skin/Tissue Integrity  Goal: Absence of new skin breakdown  Description: 1. Monitor for areas of redness and/or skin breakdown  2. Assess vascular access sites hourly  3. Every 4-6 hours minimum:  Change oxygen saturation probe site  4. Every 4-6 hours:  If on nasal continuous positive airway pressure, respiratory therapy assess nares and determine need for appliance change or resting period.   7/9/2023 1701 by Sergio Weiss RN  Outcome: Progressing     Problem: Safety - Adult  Goal: Free from fall injury  7/9/2023 1701 by Sergio Weiss RN  Outcome: Progressing     Problem: Chronic Conditions and Co-morbidities  Goal: Patient's chronic conditions and co-morbidity symptoms are monitored and maintained or improved  7/9/2023 1701 by Sergio Weiss RN  Outcome: Progressing

## 2023-07-09 NOTE — CONSULTS
Consultation Note  Podiatric Medicine and Surgery     Subjective     Chief Complaint: Bilateral foot ulcers, left foot cellulitis    HPI:  Edilma Grant is a 68 y.o. male seen at [facility] for left foot cellulitis and bilateral foot ulcers. Patient presents to [facility] with his wife admit today for left foot pain and erythema. He states that he started having fevers and chills last night has been having erythema for a few days. He was seen 2 days ago at [facility] walk-in clinic complaining of left foot pain and redness and was prescribed Bactrim and Augmentin for the cellulitis but were concerned for possible gout and was prescribed prednisone with recommendation to take Tylenol Motrin for discomfort and fever. He states that his foot has been erythematous for the past 3 days. In ED a splinter was noted to the plantar aspect of the left foot which was taken out but purulent drainage was noted. He also has a past medical history of left ankle fracture in 2015 which was treated by an orthopedic surgeon. At that time lateral malleoli and was depressed and the medial malleolus was not addressed due to the skin condition on the medial aspect of the left ankle and was expected to return in 7 to 10 days for ORIF of the medial aspect of the ankle. Since then the medial aspect of the ankle has been noted to be in a fractured state with minimal osseous bridging of the sentient nonunited medial malleolus fracture as appreciated on the CT. Patient denies any other pedal complaints at this time    PCP is Jesus Weber PA-C    ROS:   Review of Systems   Constitutional:  Positive for activity change, chills and fever. Respiratory:  Negative for choking and shortness of breath. Cardiovascular:  Negative for leg swelling. Gastrointestinal:  Negative for vomiting. Skin:  Positive for color change and wound. Neurological:  Positive for numbness. Negative for weakness.      Past
[facility]   Consult Note                 Date:   7/9/2023  Date of admission:  7/7/2023 10:08 AM  MRN:   [id number]  YOB: 1950    Reason for Consult: Regarding anticoagulation    HISTORY OF PRESENT ILLNESS:    The patient is a 68 y.o.  male who is admitted to the hospital for bilateral foot wound treatment. Patient had a history of stent in the past in [facility] recently patient has changed to our services patient is seen first time on 6/22/23. Patient was scheduled to had a stress test and echo done which was not done so far  At present denies any chest pain pressure tightness  Patient had a history of paroxysmal A-fib patient was on Coumadin according to patient since 1 week before today patient has been bleeding regularly daily basis. And patient is scheduled to had colonoscopy done which is scheduled end of this month. At present patient denies any bleeding  Denies any chest pain pressure  Denies any heart fluttering racing  Patient also had a history of sleep apnea using CPAP at home. Known to us with his first visit to Dr. Alejandrina Albarran HISTORY:    Echocardiogram in 2015 ejection fraction is 55%. No significant valvular regurgitation or stenosis seen. 2. Coronary artery disease status post PCI at [facility]. Will try to obtain results. 3.  History of pacemaker insertion at [facility]. No records available will try to obtain. 4.  Paroxysmal atrial fibrillation. Was taken off Coumadin due to episodes of bleeding. Will try to obtain records from [facility]. 5.  Essential hypertension. 6.  Type 2 diabetes mellitus followed by PCP. 7.  Hyperlipidemia on statins. Past Medical History:   has a past medical history of Atrial fibrillation ([address]), Cancer ([address]), CHF (congestive heart failure) ([address]), Closed bimalleolar fracture of left ankle, Diabetes mellitus ([address]), Hyperlipidemia, and Hypertension.     Past Surgical History:   has a past surgical
are seen in their expected locations. Mild edema in the subcutaneous fat about the ankle and foot. No organized fluid collection. No obvious ulceration or soft tissue defect identified. Joint: No tibiotalar, subtalar or midfoot joint effusions. Mild degenerative changes of the 1st MTP/MTS joints. 1. Mild cellulitis of the ankle and foot. No organized fluid collection. No obvious ulceration or soft tissue defect evident. 2. Sequela of remote trimalleolar fractures. ORIF of a distal fibular fracture with lateral sideplate and screws. Minimal osseous bridging of the fracture line of the essentially nonunited medial malleolus fracture. 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 3. No aggressive osseous destruction. Cultures:     Culture, Anaerobic and Aerobic [[id number]] Collected: 07/07/23 1800   Order Status: Sent Specimen: Foot Updated: 07/07/23 1808   Culture, Blood 1 [[id number]] Collected: 07/07/23 1107   Order Status: Completed Specimen: Blood Updated: 07/07/23 1318    Specimen Description . BLOOD    Special Requests LEFT AC 20CC    Culture NO GROWTH <24 HRS   Culture, Blood 1 [[id number]] Collected: 07/07/23 1128   Order Status: Completed Specimen: Blood Updated: 07/07/23 1317    Specimen Description . BLOOD    Special Requests RIGHT HAND 4CC GREEN CONTAINER ONLY    Culture NO GROWTH <24 HRS       Electronically signed by Eunice Guillen MD on 7/7/2023 at 6:50 PM      [facility]  Eunice Guillen MD  [employer] messaging  OFFICE: [phone]    Thank you for allowing us to participate in the care of this patient. Please call with questions.      This note is created with the assistance of a speech recognition program.  While intending to generate a document that actually reflects the content of the visit, the document can still have some errors including those of syntax and

## 2023-07-09 NOTE — PROGRESS NOTES
[facility]  Inpatient Service     Progress Note  7/9/2023    9:21 AM    Name:   Luis Ruelas  MRN:     [id number]     [address]:      [de-identified]   Room:   5/1-200   Day:  2  Admit Date:  7/7/2023 10:08 AM    PCP:   Flaquita Castro PA-C  Code Status:  Full Code    Subjective:     Patient is a 69 y/o male with a history of Afib, CHF class III, HTN, DM2, HLD, gout, fracture of the R foot, who has a pacemaker, and had a rectal bleed in April 2023 admitted for management of left diabetic foot wound with abscess, undergoing surgical intervention. Overnight events: No overnight events. Patient denies any new symptoms. Denies foot pain. Denies any fevers or chills. No chest pain. Today, pt was examined at 8:20 am.  ROS is negative except for points noted above. Medications: Allergies:     Allergies   Allergen Reactions    Lisinopril        Current Meds:   Scheduled Meds:    allopurinol  300 mg Oral Daily    atorvastatin  40 mg Oral Nightly    gabapentin  300 mg Oral TID    isosorbide mononitrate  60 mg Oral Daily    losartan  50 mg Oral Daily    metoprolol succinate  50 mg Oral Daily    [Held by provider] furosemide  40 mg Oral Daily    insulin glargine  18 Units SubCUTAneous Nightly    sodium chloride  80 mL IntraVENous Once    sodium chloride flush  5-40 mL IntraVENous 2 times per day    enoxaparin  40 mg SubCUTAneous Daily    insulin lispro  0-4 Units SubCUTAneous TID WC    insulin lispro  0-4 Units SubCUTAneous Nightly    metroNIDAZOLE  500 mg Oral 3 times per day    vancomycin (VANCOCIN) intermittent dosing (placeholder)   Other RX Placeholder    vancomycin  1,000 mg IntraVENous Q24H    cefepime  2,000 mg IntraVENous Q12H     Continuous Infusions:    sodium chloride      dextrose       PRN Meds: povidone-iodine, nitroGLYCERIN, sodium chloride flush, sodium chloride flush, sodium chloride, ondansetron **OR** ondansetron, polyethylene glycol,

## 2023-07-09 NOTE — PROGRESS NOTES
MRI screening complete. Pt has pacemaker in place; [employer] Haystack XT DR ALONSO SureScan implanted. Copy of pt's pacemaker card / info / serial # in pt's chart.   Night shift DUSTIN Reyna updated

## 2023-07-10 ENCOUNTER — APPOINTMENT (OUTPATIENT)
Dept: NON INVASIVE DIAGNOSTICS | Age: 73
DRG: 638 | End: 2023-07-10
Payer: MEDICARE

## 2023-07-10 ENCOUNTER — APPOINTMENT (OUTPATIENT)
Dept: MRI IMAGING | Age: 73
DRG: 638 | End: 2023-07-10
Payer: MEDICARE

## 2023-07-10 PROBLEM — R79.82 ELEVATED C-REACTIVE PROTEIN (CRP): Status: ACTIVE | Noted: 2023-07-10

## 2023-07-10 LAB
ANION GAP SERPL CALCULATED.3IONS-SCNC: 7 MMOL/L (ref 9–17)
BASOPHILS # BLD: 0 K/UL (ref 0–0.2)
BASOPHILS NFR BLD: 0 % (ref 0–2)
BUN SERPL-MCNC: 19 MG/DL (ref 8–23)
CALCIUM SERPL-MCNC: 10.2 MG/DL (ref 8.6–10.4)
CHLORIDE SERPL-SCNC: 108 MMOL/L (ref 98–107)
CO2 SERPL-SCNC: 22 MMOL/L (ref 20–31)
CREAT SERPL-MCNC: 1.2 MG/DL (ref 0.7–1.2)
CRP SERPL HS-MCNC: 62.9 MG/L (ref 0–5)
EKG ATRIAL RATE: 78 BPM
EKG Q-T INTERVAL: 406 MS
EKG QRS DURATION: 92 MS
EKG QTC CALCULATION (BAZETT): 477 MS
EKG R AXIS: -3 DEGREES
EKG T AXIS: 86 DEGREES
EKG VENTRICULAR RATE: 83 BPM
EOSINOPHIL # BLD: 0.2 K/UL (ref 0–0.4)
EOSINOPHILS RELATIVE PERCENT: 3 % (ref 1–4)
ERYTHROCYTE [DISTWIDTH] IN BLOOD BY AUTOMATED COUNT: 15.2 % (ref 12.5–15.4)
GFR SERPL CREATININE-BSD FRML MDRD: >60 ML/MIN/1.73M2
GLUCOSE BLD-MCNC: 152 MG/DL (ref 75–110)
GLUCOSE BLD-MCNC: 175 MG/DL (ref 75–110)
GLUCOSE BLD-MCNC: 224 MG/DL (ref 75–110)
GLUCOSE SERPL-MCNC: 156 MG/DL (ref 70–99)
HCT VFR BLD AUTO: 35.1 % (ref 41–53)
HGB BLD-MCNC: 11.6 G/DL (ref 13.5–17.5)
LV EF: 48 %
LVEF MODALITY: NORMAL
LYMPHOCYTES # BLD: 14 % (ref 24–44)
LYMPHOCYTES NFR BLD: 0.8 K/UL (ref 1–4.8)
MCH RBC QN AUTO: 29.9 PG (ref 26–34)
MCHC RBC AUTO-ENTMCNC: 32.9 G/DL (ref 31–37)
MCV RBC AUTO: 91 FL (ref 80–100)
MONOCYTES NFR BLD: 0.7 K/UL (ref 0.1–1.2)
MONOCYTES NFR BLD: 12 % (ref 2–11)
NEUTROPHILS NFR BLD: 71 % (ref 36–66)
NEUTS SEG NFR BLD: 4.1 K/UL (ref 1.8–7.7)
PLATELET # BLD AUTO: 245 K/UL (ref 140–450)
PMV BLD AUTO: 7.6 FL (ref 6–12)
POTASSIUM SERPL-SCNC: 4.4 MMOL/L (ref 3.7–5.3)
RBC # BLD AUTO: 3.86 M/UL (ref 4.5–5.9)
SODIUM SERPL-SCNC: 137 MMOL/L (ref 135–144)
WBC OTHER # BLD: 5.8 K/UL (ref 3.5–11)

## 2023-07-10 PROCEDURE — 99232 SBSQ HOSP IP/OBS MODERATE 35: CPT | Performed by: FAMILY MEDICINE

## 2023-07-10 PROCEDURE — 80048 BASIC METABOLIC PNL TOTAL CA: CPT

## 2023-07-10 PROCEDURE — 73718 MRI LOWER EXTREMITY W/O DYE: CPT

## 2023-07-10 PROCEDURE — 85027 COMPLETE CBC AUTOMATED: CPT

## 2023-07-10 PROCEDURE — 99232 SBSQ HOSP IP/OBS MODERATE 35: CPT | Performed by: NURSE PRACTITIONER

## 2023-07-10 PROCEDURE — 82947 ASSAY GLUCOSE BLOOD QUANT: CPT

## 2023-07-10 PROCEDURE — 6360000002 HC RX W HCPCS

## 2023-07-10 PROCEDURE — 1200000000 HC SEMI PRIVATE

## 2023-07-10 PROCEDURE — 6370000000 HC RX 637 (ALT 250 FOR IP): Performed by: STUDENT IN AN ORGANIZED HEALTH CARE EDUCATION/TRAINING PROGRAM

## 2023-07-10 PROCEDURE — 86140 C-REACTIVE PROTEIN: CPT

## 2023-07-10 PROCEDURE — 6370000000 HC RX 637 (ALT 250 FOR IP)

## 2023-07-10 PROCEDURE — 2580000003 HC RX 258

## 2023-07-10 PROCEDURE — 99233 SBSQ HOSP IP/OBS HIGH 50: CPT | Performed by: NURSE PRACTITIONER

## 2023-07-10 PROCEDURE — 93306 TTE W/DOPPLER COMPLETE: CPT

## 2023-07-10 PROCEDURE — 36415 COLL VENOUS BLD VENIPUNCTURE: CPT

## 2023-07-10 RX ORDER — INSULIN GLARGINE 100 [IU]/ML
10 INJECTION, SOLUTION SUBCUTANEOUS ONCE
Status: COMPLETED | OUTPATIENT
Start: 2023-07-10 | End: 2023-07-10

## 2023-07-10 RX ORDER — INSULIN GLARGINE 100 [IU]/ML
22 INJECTION, SOLUTION SUBCUTANEOUS NIGHTLY
Status: DISCONTINUED | OUTPATIENT
Start: 2023-07-11 | End: 2023-07-11 | Stop reason: HOSPADM

## 2023-07-10 RX ADMIN — ALLOPURINOL 300 MG: 300 TABLET ORAL at 08:25

## 2023-07-10 RX ADMIN — VANCOMYCIN HYDROCHLORIDE 750 MG: 750 INJECTION, POWDER, LYOPHILIZED, FOR SOLUTION INTRAVENOUS at 00:00

## 2023-07-10 RX ADMIN — ONDANSETRON 4 MG: 4 TABLET, ORALLY DISINTEGRATING ORAL at 08:29

## 2023-07-10 RX ADMIN — METRONIDAZOLE 500 MG: 500 TABLET ORAL at 15:54

## 2023-07-10 RX ADMIN — VANCOMYCIN HYDROCHLORIDE 750 MG: 750 INJECTION, POWDER, LYOPHILIZED, FOR SOLUTION INTRAVENOUS at 12:32

## 2023-07-10 RX ADMIN — SODIUM CHLORIDE, PRESERVATIVE FREE 10 ML: 5 INJECTION INTRAVENOUS at 08:25

## 2023-07-10 RX ADMIN — INSULIN GLARGINE 10 UNITS: 100 INJECTION, SOLUTION SUBCUTANEOUS at 23:28

## 2023-07-10 RX ADMIN — HYDROCODONE BITARTRATE AND ACETAMINOPHEN 2 TABLET: 5; 325 TABLET ORAL at 04:37

## 2023-07-10 RX ADMIN — METRONIDAZOLE 500 MG: 500 TABLET ORAL at 01:00

## 2023-07-10 RX ADMIN — ISOSORBIDE MONONITRATE 60 MG: 30 TABLET, EXTENDED RELEASE ORAL at 08:25

## 2023-07-10 RX ADMIN — METOPROLOL SUCCINATE 50 MG: 50 TABLET, EXTENDED RELEASE ORAL at 08:25

## 2023-07-10 RX ADMIN — CEFEPIME 2000 MG: 2 INJECTION, POWDER, FOR SOLUTION INTRAVENOUS at 11:53

## 2023-07-10 RX ADMIN — METRONIDAZOLE 500 MG: 500 TABLET ORAL at 08:25

## 2023-07-10 RX ADMIN — GABAPENTIN 300 MG: 300 CAPSULE ORAL at 08:25

## 2023-07-10 RX ADMIN — ATORVASTATIN CALCIUM 40 MG: 40 TABLET, FILM COATED ORAL at 20:27

## 2023-07-10 RX ADMIN — SODIUM CHLORIDE, PRESERVATIVE FREE 10 ML: 5 INJECTION INTRAVENOUS at 20:26

## 2023-07-10 RX ADMIN — GABAPENTIN 300 MG: 300 CAPSULE ORAL at 20:27

## 2023-07-10 RX ADMIN — GABAPENTIN 300 MG: 300 CAPSULE ORAL at 15:54

## 2023-07-10 RX ADMIN — ONDANSETRON 4 MG: 2 INJECTION INTRAMUSCULAR; INTRAVENOUS at 20:26

## 2023-07-10 RX ADMIN — LOSARTAN POTASSIUM 50 MG: 50 TABLET, FILM COATED ORAL at 08:25

## 2023-07-10 RX ADMIN — INSULIN GLARGINE 12 UNITS: 100 INJECTION, SOLUTION SUBCUTANEOUS at 20:25

## 2023-07-10 RX ADMIN — CEFEPIME 2000 MG: 2 INJECTION, POWDER, FOR SOLUTION INTRAVENOUS at 23:28

## 2023-07-10 ASSESSMENT — PAIN SCALES - GENERAL: PAINLEVEL_OUTOF10: 7

## 2023-07-10 NOTE — PROGRESS NOTES
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 Follow CBC and renal function. MRI pending due to pacemaker status. Patient PPM compatible per Cards note. Supportive care. Discussed with patient, wife at the bedside with permission. Infection Control Recommendations   [place] Precautions    Antimicrobial Stewardship Recommendations   Simplification of therapy  Targeted therapy    History of Present Illness:   Initial history:  Gladys Mohamud is a 68y.o.-year-old male type 2 and is insulin-dependent, hypertension, hyperlipidemia, prostate cancer, congestive heart failure, atrial fibrillation on Coumadin. The patient has had chronic ulcerations in the feet bilaterally and ended up going to the walk-in clinic with infection in the left foot and was started on antibiotic therapy with Augmentin and Bactrim but the patient noted worsening erythema and blister formation and had some chills but no subjective fever. He did have some nausea but no vomiting. The patient was also started on prednisone as there was concern for gout. In the emergency department a splinter was noted on the plantar aspect of the left foot which was taken out with some purulent drainage noted.   The patient had CT imaging of the left foot that showed mild cellulitis of the ankle and foot with no organized fluid collection and there is sequelae of remote trimalleolar fractures with open reduction day    vancomycin (VANCOCIN) intermittent dosing (placeholder)   Other RX Placeholder    cefepime  2,000 mg IntraVENous Q12H       Social History:     Social History     Socioeconomic History    Marital status:      Spouse name: Not on file    Number of children: Not on file    Years of education: Not on file    Highest education level: Not on file   Occupational History    Not on file   Tobacco Use    Smoking status: Former     Packs/day: 1.00     Years: 14.00     Pack years: 14.00     Types: Cigarettes     Start date: 1981     Quit date: 1995     Years since quittin.5     Passive exposure: Never    Smokeless tobacco: Never   Substance and Sexual Activity    Alcohol use: No    Drug use: No    Sexual activity: Not on file   Other Topics Concern    Not on file   Social History Narrative    Not on file     Social Determinants of Health     Financial Resource Strain: Low Risk     Difficulty of Paying Living Expenses: Not hard at all   Food Insecurity: No Food Insecurity    Worried About [place] in the Last Year: Never true    [address] in the Last Year: Never true   Transportation Needs: Unknown    Lack of Transportation (Medical): Not on file    Lack of Transportation (Non-Medical):  No   Physical Activity: Insufficiently Active    Days of Exercise per Week: 3 days    Minutes of Exercise per Session: 10 min   Stress: Not on file   Social Connections: Not on file   Intimate Partner Violence: Not At Risk    Fear of Current or Ex-Partner: No    Emotionally Abused: No    Physically Abused: No    Sexually Abused: No   Housing Stability: Unknown    Unable to Pay for Housing in the Last Year: Not on file    Number of Places Lived in the Last Year: Not on file    Unstable Housing in the Last Year: No       Family History:     Family History   Problem Relation Age of Onset    Heart Disease Mother     Heart Disease Father       Medical Decision Making:   I have independently reviewed/ordered

## 2023-07-10 NOTE — PLAN OF CARE
Problem: Discharge Planning  Goal: Discharge to home or other facility with appropriate resources  7/10/2023 0210 by Radha Blanco RN  Outcome: Progressing  Flowsheets (Taken 7/9/2023 2000)  Discharge to home or other facility with appropriate resources: Identify barriers to discharge with patient and caregiver  7/9/2023 1701 by Trish Cabrera RN  Outcome: Progressing     Problem: Pain  Goal: Verbalizes/displays adequate comfort level or baseline comfort level  7/10/2023 0210 by Radha Blanco RN  Outcome: Progressing  7/9/2023 1701 by Trish Cabrera RN  Outcome: Progressing     Problem: ABCDS Injury Assessment  Goal: Absence of physical injury  7/10/2023 0210 by Radha Blanco RN  Outcome: Progressing  7/9/2023 1701 by Trish Cabrera RN  Outcome: Progressing     Problem: Skin/Tissue Integrity  Goal: Absence of new skin breakdown  Description: 1. Monitor for areas of redness and/or skin breakdown  2. Assess vascular access sites hourly  3. Every 4-6 hours minimum:  Change oxygen saturation probe site  4. Every 4-6 hours:  If on nasal continuous positive airway pressure, respiratory therapy assess nares and determine need for appliance change or resting period.   7/10/2023 0210 by Radha Blanco RN  Outcome: Progressing  7/9/2023 1701 by Trish Cabrera RN  Outcome: Progressing     Problem: Safety - Adult  Goal: Free from fall injury  7/10/2023 0210 by Radha Blanco RN  Outcome: Progressing  7/9/2023 1701 by Trish Cabrera RN  Outcome: Progressing     Problem: Chronic Conditions and Co-morbidities  Goal: Patient's chronic conditions and co-morbidity symptoms are monitored and maintained or improved  7/10/2023 0210 by Radha Blanco RN  Outcome: Progressing  Flowsheets (Taken 7/9/2023 2000)  Care Plan - Patient's Chronic Conditions and Co-Morbidity Symptoms are Monitored and Maintained or Improved: Monitor and assess patient's chronic conditions and comorbid symptoms for stability, deterioration, or improvement  7/9/2023 1701

## 2023-07-10 NOTE — PROGRESS NOTES
[facility]   Progress Note                   Date:   7/10/2023  Patient name: Armand iKrk  Date of admission:  7/7/2023 10:08 AM  MRN:   [id number]  YOB: 1950  PCP: Aguilar Corado PA-C    Reason for Admission: Cellulitis of left foot [L03.116]  Failure of outpatient treatment [Z78.9]    Subjective:       Clinical Changes / Abnormalities: PT seen and examined in the room. Pt denies any CP or sob but does have CP at times. He has not had his device checked in a long time. Is scheduled in our office. Pt is having MRI today prior to any surgery       Medications:   Scheduled Meds:   vancomycin  750 mg IntraVENous Q12H    insulin glargine  12 Units SubCUTAneous Nightly    insulin lispro  0-8 Units SubCUTAneous TID WC    insulin lispro  0-4 Units SubCUTAneous Nightly    allopurinol  300 mg Oral Daily    atorvastatin  40 mg Oral Nightly    gabapentin  300 mg Oral TID    isosorbide mononitrate  60 mg Oral Daily    losartan  50 mg Oral Daily    metoprolol succinate  50 mg Oral Daily    [Held by provider] furosemide  40 mg Oral Daily    sodium chloride  80 mL IntraVENous Once    sodium chloride flush  5-40 mL IntraVENous 2 times per day    [Held by provider] enoxaparin  40 mg SubCUTAneous Daily    metroNIDAZOLE  500 mg Oral 3 times per day    vancomycin (VANCOCIN) intermittent dosing (placeholder)   Other RX Placeholder    cefepime  2,000 mg IntraVENous Q12H     Continuous Infusions:   sodium chloride      dextrose       CBC:   Recent Labs     07/08/23  0658 07/09/23  0535 07/10/23  0612   WBC 6.2 5.5 5.8   HGB 11.3* 11.9* 11.6*    235 245     BMP:    Recent Labs     07/08/23  0658 07/09/23  0535 07/10/23  0612    135 137   K 4.3 4.2 4.4    107 108*   CO2 21 19* 22   BUN 25* 23 19   CREATININE 1.19 1.17 1.2   GLUCOSE 133* 210* 156*     Hepatic: No results for input(s): AST, ALT, ALB, BILITOT, ALKPHOS in the last 72 hours.   Troponin: No results for input(s): TROPHS in the

## 2023-07-10 NOTE — PLAN OF CARE
Problem: Discharge Planning  Goal: Discharge to home or other facility with appropriate resources  7/10/2023 1447 by Margaret Jin RN  Outcome: Progressing  Flowsheets (Taken 7/10/2023 0800)  Discharge to home or other facility with appropriate resources: Identify barriers to discharge with patient and caregiver  7/10/2023 0210 by Leona Jarrell RN  Outcome: Progressing  [address] (Taken 7/9/2023 2000)  Discharge to home or other facility with appropriate resources: Identify barriers to discharge with patient and caregiver     Problem: Pain  Goal: Verbalizes/displays adequate comfort level or baseline comfort level  7/10/2023 1447 by Margaret Jin RN  Outcome: Progressing  7/10/2023 0210 by Leona Jarrell RN  Outcome: Progressing     Problem: ABCDS Injury Assessment  Goal: Absence of physical injury  7/10/2023 1447 by Margaret Jin RN  Outcome: Progressing  7/10/2023 0210 by Leona Jarrell RN  Outcome: Progressing     Problem: Skin/Tissue Integrity  Goal: Absence of new skin breakdown  Description: 1. Monitor for areas of redness and/or skin breakdown  2. Assess vascular access sites hourly  3. Every 4-6 hours minimum:  Change oxygen saturation probe site  4. Every 4-6 hours:  If on nasal continuous positive airway pressure, respiratory therapy assess nares and determine need for appliance change or resting period.   7/10/2023 1447 by Margaret Jin RN  Outcome: Progressing  7/10/2023 0210 by Leona Jarrell RN  Outcome: Progressing     Problem: Safety - Adult  Goal: Free from fall injury  7/10/2023 1447 by Margaret Jin RN  Outcome: Progressing  7/10/2023 0210 by Leona Jarrell RN  Outcome: Progressing     Problem: Chronic Conditions and Co-morbidities  Goal: Patient's chronic conditions and co-morbidity symptoms are monitored and maintained or improved  7/10/2023 1447 by Margaret Jin RN  Outcome: Progressing  Flowsheets (Taken 7/10/2023 0800)  Care Plan - Patient's Chronic Conditions and Co-Morbidity Symptoms are Monitored and Maintained or Improved: Monitor and assess patient's chronic conditions and comorbid symptoms for stability, deterioration, or improvement  7/10/2023 0210 by Lg Brown RN  Outcome: Progressing  Flowsheets (Taken 7/9/2023 2000)  Care Plan - Patient's Chronic Conditions and Co-Morbidity Symptoms are Monitored and Maintained or Improved: Monitor and assess patient's chronic conditions and comorbid symptoms for stability, deterioration, or improvement

## 2023-07-10 NOTE — PROGRESS NOTES
Exam:  Vascular: DP and PT pulses are palpable. CFT < 3 seconds  brisk to all digits. Hair growth is present to the level of the digits. Edema appreciated to the left foot    Neuro: Saph/sural/SP/DP/plantar sensation diminished to light touch. Musculoskeletal: Muscle strength is 4/5 to all lower extremity muscle groups. Gross deformity is appreciated with syndactyly to bilateral second and third digits. Compartments are soft and compressible. No pain with compression of posterior calf. Dermatologic (postdebridement):   Full thickness ulcer #1 noted to the 3rd interspace between the 3rd and 4th digits and measures approximately 0.8cm x 0.9cm x 2.8cm. Base is fibrotic. Periwound skin is erythematous. Significant purulent drainage noted with no associated mal odor. Erythema appreciated with  associated increase in warmth. Does not probe to bone, sinus track, or undermine. No fluctuance, crepitus, or induration. Interdigital maceration absent. Full thickness ulcer #2 noted to the plantar aspect of the of the left forefoot and measures approximately 0.7cm x 0.8cm x 0.8cm. Base is fibrotic. Periwound skin is erythematous. No active drainage noted with no associated mal odor. Erythema is appreciated with  associated increase in warmth. Does not probe to bone, sinus track, or undermine. No fluctuance, crepitus, or induration. Interdigital maceration absent. Full thickness ulcer #3 noted to the right plantar foot and measures approximately 0.5cm x 0.8cm x 0.4cm. Base is fibrogranular. Periwound skin is hyoerkeratotic. No drainage noted with no associated mal odor. Does not probe to bone, sinus track, or undermine. No fluctuance, crepitus, or induration. Interdigital maceration absent. Overall erythema decreasing slowly since the initially presentation to the left foot    Clinical:               Imaging:   XR FOOT RIGHT (2 VIEWS)   Final Result   No acute fracture or dislocation.   No osseous appreciated  MRI of the Left forefoot still pending due to pacemaker status. Medical management per Internal medicine  ID consulted. Abx: cefepime, vancomycin and flagyl  Cultures sent to lab. Pending results  Dressing applied to LEFT foot: betadine soaked gauze, dry gauze, ABD pad, kerlix and ace wrap  Dressing applied to right foot: betadine and bandaid. WBAT to Bilateral lower extremity but preferably to the heel in a surgical shoe to the left foot. Discussed with  Dr. Talbert [facility].     Mc Arredondo DPM   Podiatric Medicine & Surgery   7/10/2023 at 9:06 AM

## 2023-07-10 NOTE — ACP (ADVANCE CARE PLANNING)
Advance Care Planning     Advance Care Planning Activator (Inpatient)  Conversation Note      Date of ACP Conversation: 7/10/2023     Conversation Conducted with: Patient with Decision Making Capacity    ACP Activator: Brandi Steele RN    {Health Care Decision Maker:     Current Designated Health Care Decision Maker:     Primary Decision Maker: Paige Triana - [phone]  Click here to complete [address] including section of the Healthcare Decision Maker Relationship (ie \"Primary\")  Today we documented Decision Maker(s) consistent with Legal Next of Kin hierarchy. Care Preferences    Ventilation: \"If you were in your present state of health and suddenly became very ill and were unable to breathe on your own, what would your preference be about the use of a ventilator (breathing machine) if it were available to you? \"      Would the patient desire the use of ventilator (breathing machine)?: yes    \"If your health worsens and it becomes clear that your chance of recovery is unlikely, what would your preference be about the use of a ventilator (breathing machine) if it were available to you? \"     Would the patient desire the use of ventilator (breathing machine)?: Yes      Resuscitation  \"CPR works best to restart the heart when there is a sudden event, like a heart attack, in someone who is otherwise healthy. Unfortunately, CPR does not typically restart the heart for people who have serious health conditions or who are very sick. \"    \"In the event your heart stopped as a result of an underlying serious health condition, would you want attempts to be made to restart your heart (answer \"yes\" for attempt to resuscitate) or would you prefer a natural death (answer \"no\" for do not attempt to resuscitate)? \" yes       [] Yes   [x] No   Educated Patient / Lary Deems regarding differences between Advance Directives and portable DNR orders.     Length of ACP Conversation in minutes:7min Conversation Outcomes:  ACP discussion completed    Follow-up plan:    [] Schedule follow-up conversation to continue planning  [x] Referred individual to Provider for additional questions/concerns   [] Advised patient/agent/surrogate to review completed ACP document and update if needed with changes in condition, patient preferences or care setting    [] This note routed to one or more involved healthcare providers

## 2023-07-10 NOTE — PROGRESS NOTES
Rt in room to assess patient's home CPAP for cleanliness and function. Filled humidifier with patient's own distilled water and  for patient to use. Patient will put himself on and bedtime.

## 2023-07-10 NOTE — PROGRESS NOTES
[facility] Medicine [facility]  Inpatient Service     Progress Note  7/10/2023    8:47 AM    Name:   Kymberly Lakhani  MRN:     [id number]     [address]:      [de-identified]   Room:   5/1-200   Day:  3  Admit Date:  7/7/2023 10:08 AM    PCP:   Meghan Edward PA-C  Code Status:  Full Code    Subjective:      Patient is a 67 y/o male with a history of Afib, CHF class III, HTN, DM2, HLD, gout, fracture of the R foot, who has a pacemaker, and had a rectal bleed in April 2023 admitted for management of left diabetic foot wound with abscess, undergoing surgical intervention. He us AA0x3 this morning and feels well. He had minor L upper very sharp localized chest/shoulder pain that moved down his left since this morning that was increased by motion. The pain medication NORCO that he received this morning helped. He did not have any shortness of breath or chest tightness or heaviness with this. Overnight events: Had some trouble with a cough overnight and was given   Robitussin-DM (dextromethorphan-guaifenesin). He was also brought in his at home CPAP which he used overnight. Today, pt was examined at 7:30. ROS is negative except for points noted above. Medications: Allergies:     Allergies   Allergen Reactions    Lisinopril        Current Meds:   Scheduled Meds:    vancomycin  750 mg IntraVENous Q12H    insulin glargine  12 Units SubCUTAneous Nightly    insulin lispro  0-8 Units SubCUTAneous TID WC    insulin lispro  0-4 Units SubCUTAneous Nightly    allopurinol  300 mg Oral Daily    atorvastatin  40 mg Oral Nightly    gabapentin  300 mg Oral TID    isosorbide mononitrate  60 mg Oral Daily    losartan  50 mg Oral Daily    metoprolol succinate  50 mg Oral Daily    [Held by provider] furosemide  40 mg Oral Daily    sodium chloride  80 mL IntraVENous Once    sodium chloride flush  5-40 mL IntraVENous 2 times per day    [Held by provider] enoxaparin  40 mg SubCUTAneous Daily encounter. I agree with the assessment, plan and orders as documented by the resident. Awaiting results of MRI of the foot. Plan was for patient to have surgical intervention on the wound today. Patient did have some coughing overnight. Will initiate an inspiratory spirometer. Discussed plan with patient and his wife. They were in agreement with the plan. All questions were answered.     Mina Melissa DO, MPH  7/10/2023

## 2023-07-11 VITALS
HEART RATE: 71 BPM | SYSTOLIC BLOOD PRESSURE: 132 MMHG | WEIGHT: 195.77 LBS | OXYGEN SATURATION: 96 % | BODY MASS INDEX: 34.69 KG/M2 | DIASTOLIC BLOOD PRESSURE: 88 MMHG | HEIGHT: 63 IN | TEMPERATURE: 98.1 F | RESPIRATION RATE: 16 BRPM

## 2023-07-11 LAB
ANION GAP SERPL CALCULATED.3IONS-SCNC: 10 MMOL/L (ref 9–17)
BASOPHILS # BLD: 0 K/UL (ref 0–0.2)
BASOPHILS NFR BLD: 1 % (ref 0–2)
BUN SERPL-MCNC: 20 MG/DL (ref 8–23)
CALCIUM SERPL-MCNC: 9.9 MG/DL (ref 8.6–10.4)
CHLORIDE SERPL-SCNC: 109 MMOL/L (ref 98–107)
CO2 SERPL-SCNC: 20 MMOL/L (ref 20–31)
CREAT SERPL-MCNC: 1 MG/DL (ref 0.7–1.2)
EOSINOPHIL # BLD: 0.2 K/UL (ref 0–0.4)
EOSINOPHILS RELATIVE PERCENT: 5 % (ref 1–4)
ERYTHROCYTE [DISTWIDTH] IN BLOOD BY AUTOMATED COUNT: 15 % (ref 12.5–15.4)
ERYTHROCYTE [SEDIMENTATION RATE] IN BLOOD BY WESTERGREN METHOD: 49 MM/HR (ref 0–20)
GFR SERPL CREATININE-BSD FRML MDRD: >60 ML/MIN/1.73M2
GLUCOSE BLD-MCNC: 175 MG/DL (ref 75–110)
GLUCOSE BLD-MCNC: 217 MG/DL (ref 75–110)
GLUCOSE SERPL-MCNC: 148 MG/DL (ref 70–99)
HCT VFR BLD AUTO: 35.6 % (ref 41–53)
HGB BLD-MCNC: 11.9 G/DL (ref 13.5–17.5)
LYMPHOCYTES # BLD: 18 % (ref 24–44)
LYMPHOCYTES NFR BLD: 0.8 K/UL (ref 1–4.8)
MCH RBC QN AUTO: 30.1 PG (ref 26–34)
MCHC RBC AUTO-ENTMCNC: 33.4 G/DL (ref 31–37)
MCV RBC AUTO: 90.3 FL (ref 80–100)
MICROORGANISM SPEC CULT: ABNORMAL
MICROORGANISM SPEC CULT: ABNORMAL
MICROORGANISM/AGENT SPEC: ABNORMAL
MICROORGANISM/AGENT SPEC: ABNORMAL
MONOCYTES NFR BLD: 0.6 K/UL (ref 0.1–1.2)
MONOCYTES NFR BLD: 13 % (ref 2–11)
NEUTROPHILS NFR BLD: 63 % (ref 36–66)
NEUTS SEG NFR BLD: 2.7 K/UL (ref 1.8–7.7)
PLATELET # BLD AUTO: 262 K/UL (ref 140–450)
PMV BLD AUTO: 7.7 FL (ref 6–12)
POTASSIUM SERPL-SCNC: 4.5 MMOL/L (ref 3.7–5.3)
RBC # BLD AUTO: 3.95 M/UL (ref 4.5–5.9)
SODIUM SERPL-SCNC: 139 MMOL/L (ref 135–144)
SPECIMEN DESCRIPTION: ABNORMAL
VANCOMYCIN SERPL-MCNC: 15.8 UG/ML
WBC OTHER # BLD: 4.3 K/UL (ref 3.5–11)

## 2023-07-11 PROCEDURE — 36415 COLL VENOUS BLD VENIPUNCTURE: CPT

## 2023-07-11 PROCEDURE — 99233 SBSQ HOSP IP/OBS HIGH 50: CPT | Performed by: NURSE PRACTITIONER

## 2023-07-11 PROCEDURE — 99232 SBSQ HOSP IP/OBS MODERATE 35: CPT | Performed by: NURSE PRACTITIONER

## 2023-07-11 PROCEDURE — 82947 ASSAY GLUCOSE BLOOD QUANT: CPT

## 2023-07-11 PROCEDURE — 85652 RBC SED RATE AUTOMATED: CPT

## 2023-07-11 PROCEDURE — 6360000002 HC RX W HCPCS

## 2023-07-11 PROCEDURE — 80202 ASSAY OF VANCOMYCIN: CPT

## 2023-07-11 PROCEDURE — 2580000003 HC RX 258

## 2023-07-11 PROCEDURE — 6370000000 HC RX 637 (ALT 250 FOR IP): Performed by: NURSE PRACTITIONER

## 2023-07-11 PROCEDURE — 99238 HOSP IP/OBS DSCHRG MGMT 30/<: CPT | Performed by: FAMILY MEDICINE

## 2023-07-11 PROCEDURE — 80048 BASIC METABOLIC PNL TOTAL CA: CPT

## 2023-07-11 PROCEDURE — 6370000000 HC RX 637 (ALT 250 FOR IP)

## 2023-07-11 PROCEDURE — 85027 COMPLETE CBC AUTOMATED: CPT

## 2023-07-11 RX ORDER — DOXYCYCLINE HYCLATE 100 MG
100 TABLET ORAL EVERY 12 HOURS SCHEDULED
Status: DISCONTINUED | OUTPATIENT
Start: 2023-07-11 | End: 2023-07-11 | Stop reason: HOSPADM

## 2023-07-11 RX ORDER — DOXYCYCLINE HYCLATE 100 MG
100 TABLET ORAL EVERY 12 HOURS SCHEDULED
Qty: 28 TABLET | Refills: 0 | Status: SHIPPED | OUTPATIENT
Start: 2023-07-11 | End: 2023-07-25

## 2023-07-11 RX ORDER — GABAPENTIN 300 MG/1
300 CAPSULE ORAL 3 TIMES DAILY
Qty: 90 CAPSULE | Refills: 0 | Status: SHIPPED | OUTPATIENT
Start: 2023-07-11 | End: 2023-08-10

## 2023-07-11 RX ORDER — ACETAMINOPHEN 650 MG
TABLET, EXTENDED RELEASE ORAL
Qty: 236 ML | Refills: 0 | Status: SHIPPED | OUTPATIENT
Start: 2023-07-11 | End: 2023-07-18

## 2023-07-11 RX ORDER — NON-ADHERENT BANDAGE 8"X10"
1 BANDAGE TOPICAL
Qty: 20 EACH | Refills: 0 | Status: SHIPPED | OUTPATIENT
Start: 2023-07-12

## 2023-07-11 RX ADMIN — VANCOMYCIN HYDROCHLORIDE 750 MG: 750 INJECTION, POWDER, LYOPHILIZED, FOR SOLUTION INTRAVENOUS at 00:09

## 2023-07-11 RX ADMIN — METOPROLOL SUCCINATE 50 MG: 50 TABLET, EXTENDED RELEASE ORAL at 08:43

## 2023-07-11 RX ADMIN — METRONIDAZOLE 500 MG: 500 TABLET ORAL at 08:43

## 2023-07-11 RX ADMIN — ALLOPURINOL 300 MG: 300 TABLET ORAL at 08:43

## 2023-07-11 RX ADMIN — GABAPENTIN 300 MG: 300 CAPSULE ORAL at 15:26

## 2023-07-11 RX ADMIN — ISOSORBIDE MONONITRATE 60 MG: 30 TABLET, EXTENDED RELEASE ORAL at 08:43

## 2023-07-11 RX ADMIN — LOSARTAN POTASSIUM 50 MG: 50 TABLET, FILM COATED ORAL at 08:43

## 2023-07-11 RX ADMIN — DOXYCYCLINE HYCLATE 100 MG: 100 TABLET, COATED ORAL at 12:07

## 2023-07-11 RX ADMIN — METRONIDAZOLE 500 MG: 500 TABLET ORAL at 00:53

## 2023-07-11 RX ADMIN — GABAPENTIN 300 MG: 300 CAPSULE ORAL at 08:43

## 2023-07-11 RX ADMIN — ACETAMINOPHEN 650 MG: 325 TABLET ORAL at 00:53

## 2023-07-11 ASSESSMENT — PAIN DESCRIPTION - LOCATION: LOCATION: HEAD

## 2023-07-11 ASSESSMENT — PAIN DESCRIPTION - DESCRIPTORS: DESCRIPTORS: ACHING

## 2023-07-11 ASSESSMENT — PAIN SCALES - WONG BAKER: WONGBAKER_NUMERICALRESPONSE: 0

## 2023-07-11 ASSESSMENT — PAIN SCALES - GENERAL
PAINLEVEL_OUTOF10: 3
PAINLEVEL_OUTOF10: 0
PAINLEVEL_OUTOF10: 0

## 2023-07-11 ASSESSMENT — PAIN - FUNCTIONAL ASSESSMENT: PAIN_FUNCTIONAL_ASSESSMENT: ACTIVITIES ARE NOT PREVENTED

## 2023-07-11 NOTE — DISCHARGE INSTR - COC
Respiratory Treatments: ***  Oxygen Therapy:  is not on home oxygen therapy. Ventilator:    Home Cpap    Rehab Therapies: Podiatry  Weight Bearing Status/Restrictions: No weight bearing restrictions  Other Medical Equipment (for information only, NOT a DME order):  cane/boot  Other Treatments: ***    Patient's personal belongings (please select all that are sent with patient):  None    RN SIGNATURE:  {Esignature:[id number]}    CASE MANAGEMENT/SOCIAL WORK SECTION    Inpatient Status Date: 7/7/2023    Readmission Risk Assessment Score:  Readmission Risk              Risk of Unplanned Readmission:  17           Discharging to Facility/ [address]  Fax: [phone]    / signature: Electronically signed by Alexis Julien RN on 7/11/23 at 3:04 PM EDT    PHYSICIAN SECTION    Prognosis: Good    Condition at Discharge: Stable    Rehab Potential (if transferring to Rehab): N/A    Recommended Labs or Other Treatments After Discharge:     Please apply dressing to left foot:  betadine soaked packing, dry gauze, ABD pad, kerlix and ace wrap  Please apply dressing to right foot: betadine and bandaid. Please change dressing at least three times a week. Physician Certification: I certify the above information and transfer of [facility]  is necessary for the continuing treatment of the diagnosis listed and that he requires [address] [place] [address] for less than 30 days.      Update Admission H&P: No change in H&P    PHYSICIAN SIGNATURE:  Electronically signed by Angie Reagan MD on 7/11/23 at 2:41 PM EDT

## 2023-07-11 NOTE — PROGRESS NOTES
Progress Note  Podiatric Medicine and Surgery     Subjective     Chief Complaint: Bilateral foot ulcers, left foot cellulitis    Interval history:  MRI done yesterday. Resting in bed comfortably with no acute distress. Does not complain any pain    HPI:  Edgardo Baez is a 68 y.o. male seen at [facility] for left foot cellulitis and bilateral foot ulcers. Patient presents to [facility] with his wife admit today for left foot pain and erythema. He states that he started having fevers and chills last night has been having erythema for a few days. He was seen 2 days ago at [facility] walk-in clinic complaining of left foot pain and redness and was prescribed Bactrim and Augmentin for the cellulitis but were concerned for possible gout and was prescribed prednisone with recommendation to take Tylenol Motrin for discomfort and fever. He states that his foot has been erythematous for the past 3 days. In ED a splinter was noted to the plantar aspect of the left foot which was taken out but purulent drainage was noted. He also has a past medical history of left ankle fracture in 2015 which was treated by an orthopedic surgeon. At that time lateral malleoli and was depressed and the medial malleolus was not addressed due to the skin condition on the medial aspect of the left ankle and was expected to return in 7 to 10 days for ORIF of the medial aspect of the ankle. Since then the medial aspect of the ankle has been noted to be in a fractured state with minimal osseous bridging of the sentient nonunited medial malleolus fracture as appreciated on the CT. Patient denies any other pedal complaints at this time    PCP is Charles Jones PA-C    Past Medical History   has a past medical history of Atrial fibrillation ([address]), Cancer ([address]), CHF (congestive heart failure) ([address]), Closed bimalleolar fracture of left ankle, Diabetes mellitus ([address]), Hyperlipidemia, and Hypertension.     Past smoker    Pacemaker    Acute kidney injury ([address] )    History of prostate cancer    Failure of outpatient treatment    Benign hypertensive heart disease with heart failure (HCC)    Abscess of left foot    Elevated C-reactive protein (CRP)  Resolved Problems:    * No resolved hospital problems. *        Plan     Patient examined and evaluated at bedside. Treatment options discussed in detail with the patient. Plain film radiographs ordered. Negative for soft tissue emphysema, acute fracture/dislocation, foreign body, or osteomyelitis. Awaiting right foot xrays  CT of the LLE ordered to rule out deep abscess/osteomyelitis. None appreciated  MRI of the Left forefoot does not show any abscess. Some increase intensity on T2 image likely due to osteotitis not osteomyelitis. Medical management per Internal medicine  ID consulted. Abx: cefepime, vancomycin   Dressing applied to LEFT foot:  betadine soaked packing, dry gauze, ABD pad, kerlix and ace wrap  Dressing applied to right foot: betadine and bandaid. No surgical intervention. Okay to be discharged with final abx recommendation per ID. Will need dressing change. DC instruction in. LUC in. WBAT to Bilateral lower extremity but preferably to the heel in a surgical shoe to the left foot. Discussed with  Dr. Carla Busch.     Joan Doss DPM   Podiatric Medicine & Surgery   7/11/2023 at 7:43 AM

## 2023-07-11 NOTE — PLAN OF CARE
Problem: Discharge Planning  Goal: Discharge to home or other facility with appropriate resources  Outcome: Completed  Flowsheets (Taken 7/11/2023 0831)  Discharge to home or other facility with appropriate resources:   Identify barriers to discharge with patient and caregiver   Arrange for needed discharge resources and transportation as appropriate   Identify discharge learning needs (meds, wound care, etc)     Problem: Pain  Goal: Verbalizes/displays adequate comfort level or baseline comfort level  Outcome: Completed     Problem: ABCDS Injury Assessment  Goal: Absence of physical injury  Outcome: Completed     Problem: Skin/Tissue Integrity  Goal: Absence of new skin breakdown  Description: 1. Monitor for areas of redness and/or skin breakdown  2. Assess vascular access sites hourly  3. Every 4-6 hours minimum:  Change oxygen saturation probe site  4. Every 4-6 hours:  If on nasal continuous positive airway pressure, respiratory therapy assess nares and determine need for appliance change or resting period.   Outcome: Completed     Problem: Safety - Adult  Goal: Free from fall injury  Outcome: Completed     Problem: Chronic Conditions and Co-morbidities  Goal: Patient's chronic conditions and co-morbidity symptoms are monitored and maintained or improved  Outcome: Completed  Flowsheets (Taken 7/11/2023 0831)  Care Plan - Patient's Chronic Conditions and Co-Morbidity Symptoms are Monitored and Maintained or Improved:   Monitor and assess patient's chronic conditions and comorbid symptoms for stability, deterioration, or improvement   Collaborate with multidisciplinary team to address chronic and comorbid conditions and prevent exacerbation or deterioration   Update acute care plan with appropriate goals if chronic or comorbid symptoms are exacerbated and prevent overall improvement and discharge

## 2023-07-11 NOTE — PROGRESS NOTES
Pt discharged via wheelchair with all belongings, scripts and discharge paperwork. Follow up appointments and discharge instructions reviewed with pt and his son. All question answered to satisfaction.    Left with cane and cpap machine

## 2023-07-11 NOTE — PROGRESS NOTES
[facility] Medicine Residency  Inpatient Service     Progress Note  7/11/2023    8:38 AM    Name:   Madiha Zimmerman  MRN:     [id number]     [address]:      [de-identified]   Room:   [address] [place] [address] Day:  4  Admit Date:  7/7/2023 10:08 AM    PCP:   Klever Howe PA-C  Code Status:  Full Code    Subjective: This is a 68 y.o. male with T2D who presents with left foot cellulitis and admitted for management of left diabetic foot wound/cellulitis and right foot diabetic ulcer. Overnight events: None    Patient was seen and evaluated at bedside this AM.  He is hemodynamically stable and afebrile. Patient reports a dry cough, abdominal discomfort, loose stools, and a slight dysuria. Dysuria likely secondary to history of radiation of prostate cancer as patient  has felt this before. He denies any fevers, chills, sweats, chest pain, nausea, vomiting. A long discussion was had about plan of care at this point moving forward with likelihood of discharge soon and transition to oral antibiotics. Patient had no pressing concerns with all questions answered. Medications: Allergies:     Allergies   Allergen Reactions    Lisinopril        Current Meds:   Scheduled Meds:    vancomycin  750 mg IntraVENous Q12H    insulin glargine  22 Units SubCUTAneous Nightly    insulin lispro  0-8 Units SubCUTAneous TID WC    insulin lispro  0-4 Units SubCUTAneous Nightly    allopurinol  300 mg Oral Daily    atorvastatin  40 mg Oral Nightly    gabapentin  300 mg Oral TID    isosorbide mononitrate  60 mg Oral Daily    losartan  50 mg Oral Daily    metoprolol succinate  50 mg Oral Daily    [Held by provider] furosemide  40 mg Oral Daily    sodium chloride  80 mL IntraVENous Once    sodium chloride flush  5-40 mL IntraVENous 2 times per day    [Held by provider] enoxaparin  40 mg SubCUTAneous Daily    metroNIDAZOLE  500 mg Oral 3 times per day    vancomycin (VANCOCIN) intermittent dosing (placeholder) was admitted for management of left foot cellulitis and diabetic wounds to the feet  -At this time we will not pursue GI consult as patient is scheduled for outpatient colonoscopy late July; outpatient follow up after this to determine appropriate anticoagulation regimen    Congestive heart failure NYHA class III  Pacemaker insertion  -Will continue Lasix, and metoprolol  -Cardiology will be performing a pacemaker interrogation      Gout  -Continue home allopurinol  Telemetry: None  Anticoagulation: Lovenox   Dispo: Home   Diet: ADULT DIET; Regular; 4 carb choices (60 gm/meal)    Patient was seen, evaluated and discussed with DO Suze Givens MD  Family Medicine Resident, PGY-2   7/11/2023  8:38 AM      Senior Resident Attestation:    I have independently seen Haleigh Sidhu and discussed the assessment and plan with the intern listed above and the attending Isabell Gee DO. I have reviewed the note and have included any edits or additional information above. Astrid Estrada MD  Family Medicine Resident, PGY-3   7/11/2023  4:20 PM     Attending Physician Statement  I have seen and discussed the care of Haleigh Sidhu  including pertinent history and exam findings, with the resident. I have reviewed the key elements of all parts of the encounter with the resident at the time of the encounter. I agree with the assessment, plan and orders as documented by the resident. Planning for patient to be discharged today on oral antibiotics per ID. Will need follow-up with podiatry and ID, as well as PCP. Discussed plan with patient and he was in agreement with the plan. All questions answered.     Isabell Gee DO, MPH  7/11/2023

## 2023-07-11 NOTE — CARE COORDINATION
Updated patient on [address] confirmed to begin on Thursday. Insurance shows care covered but advised patient and wife who was on speaker phone that they needed to check there  insurance plan. RN updated on plan and advised patient would like update on pacemaker and Echo results before being discharged.

## 2023-07-11 NOTE — PLAN OF CARE
Problem: Discharge Planning  Goal: Discharge to home or other facility with appropriate resources  7/11/2023 0039 by Evan Ordaz RN  Outcome: Progressing   Patient actively participates in ADLs and decision making regarding plan of care. Problem: Pain  Goal: Verbalizes/displays adequate comfort level or baseline comfort level  7/11/2023 0039 by Evan Ordaz RN  Outcome: Progressing   No new signs/symptoms of pain noted, pain rating < 3 on scale 0-10, pain controlled with medication/repositioning. Problem: ABCDS Injury Assessment  Goal: Absence of physical injury  7/11/2023 0039 by Evan Ordaz RN  Outcome: Progressing   No falls/injuries this shift, bed in lowest position, brakes on, bed alarm on, call light in reach, side rails up x2.

## 2023-07-11 NOTE — DISCHARGE INSTRUCTIONS
Podiatry instructions:  Please apply dressing to left foot:  betadine soaked packing, dry gauze, ABD pad, kerlix and ace wrap  Please apply dressing to right foot: betadine and bandaid. Please change dressing at least three times a week. Primary care team instructions: Follow up with your primary care physician in 1 to 2 days days  Follow up with podiatrist Dr. Homero Edward in 1 week  Follow-up with Dr. Javi Gudino in 2 weeks  Take all your medication as prescribed. If your prescription has refills, obtain the medication refills from the pharmacy before you run out. Seek medical attention if you run out of a medication you need and do not have any refills of.   Reasons to call your doctor or go to the ER:  If you develop severe chest pain, shortness of breath, uncontrolled fever, difficulty breathing, or any other concerning symptoms  Make sure not to miss her colonoscopy in late July because that will determine whether you are restarted on anticoagulation

## 2023-07-11 NOTE — CARE COORDINATION
Spoke with  regarding dressing changes, wife had indicated she could change but then changed mind late in afternoon. Wife is teachable.

## 2023-07-11 NOTE — DISCHARGE SUMMARY
[facility]  Inpatient Service    Discharge Summary     Patient ID: Boogie Shabazz  :  1950   MRN: [id number]     ACCOUNT:  [de-identified]   Patient's PCP: Feliz Wood PA-C  Admit Date: 2023   Discharge Date: 2023  Length of Stay: 4  Code Status:  Full Code  Admitting Physician: Charlee Castelan, DO  Discharge Physician: Hayden Sheppard MD     Active Discharge Diagnoses:     Hospital Problem Lists:  Principal Problem:    Cellulitis of left foot  Active Problems:    Hypertension    CHF (congestive heart failure), NYHA class III ([facility])    Atrial fibrillation ([address])    Type 2 diabetes mellitus with diabetic polyneuropathy, with long-term current use of insulin ([facility])    Dyslipidemia (high LDL; low HDL)    History of fracture of left ankle    Coronary artery disease of native artery of native heart with stable angina pectoris ([address])    Diabetic ulcer of right foot with fat layer exposed ([address])    Former smoker    Pacemaker    Acute kidney injury ([address])    History of prostate cancer    Failure of outpatient treatment    Benign hypertensive heart disease with heart failure ([address])    Abscess of left foot    Elevated C-reactive protein (CRP)  Resolved Problems:    * No resolved hospital problems. *      Admission Condition:  poor     Discharged Condition: stable    Hospital Stay:     Hospital Course:      Boogie Shabazz is a 68 y.o. male with a significant past medical history of type 2 diabetes, gout, CHF, CAD, A-fib, hypertension, hypercholesterolemia who presents with left foot pain and and was admitted for management  left diabetic foot wound/cellulitis and right diabetic foot full thickness ulcer. Vitals on initial presentation showed patient was hemodynamically stable and afebrile. Labs showed leukocytosis of 11.2 and an SLIM with a creatinine of 1.71 and BUN 39. Inflammatory markers were elevated with a CRP of 158.9 and ESR 57.   A CT scan of the left

## 2023-07-11 NOTE — DISCHARGE INSTR - DIET

## 2023-07-12 ENCOUNTER — TELEPHONE (OUTPATIENT)
Dept: PRIMARY CARE CLINIC | Age: 73
End: 2023-07-12

## 2023-07-12 LAB
MICROORGANISM SPEC CULT: NORMAL
MICROORGANISM SPEC CULT: NORMAL
SERVICE CMNT-IMP: NORMAL
SERVICE CMNT-IMP: NORMAL
SPECIMEN DESCRIPTION: NORMAL
SPECIMEN DESCRIPTION: NORMAL

## 2023-07-12 NOTE — TELEPHONE ENCOUNTER
Marla Rodriguez from [facility] called, pending patient's discharge today from [facility] they will see patient for PT,OT, and skilled nursing. Writer gave verbal okay that PCP would sign home orders.

## 2023-07-13 ENCOUNTER — TELEPHONE (OUTPATIENT)
Dept: PRIMARY CARE CLINIC | Age: 73
End: 2023-07-13

## 2023-07-13 NOTE — TELEPHONE ENCOUNTER
Care Transitions Initial Follow Up Call    Outreach made within 2 business days of discharge: Yes    Patient: Madiha Zimmerman   Patient : 1950   MRN: [id number]    Reason for Admission: cellulitis of left foot   Discharge Date: 23       Spoke with: Madiha Zimmerman    Discharge department/facility: [facility]     *Patient declined*    TCM Interactive Patient Contact:  Was patient able to fill all prescriptions: Yes  Was patient instructed to bring all medications to the follow-up visit: Yes  Is patient taking all medications as directed in the discharge summary?  Yes  Does patient understand their discharge instructions: Yes  Does patient have questions or concerns that need addressed prior to 7-14 day follow up office visit: no    Scheduled appointment with PCP within 7-14 days    Follow Up  Future Appointments   Date Time Provider [address] [place] [address]   2023 11:30 AM SCHEDULE, AFL TCC [facility] AFL TCC [facility] [place]   2023  9:00 [facility]   2023 11:15 AM SCHEDULE, AFL T[facility] [place] ECHO AFL TCC [facility]   2023 11:00 AM Kumar Moon MD INFT DISEASE [facility]   2023  9:30  [address], PA-C [place] [facility]   2023 11:15 AM Pool Mail,  N [address] Yissel, [facility]

## 2023-07-20 NOTE — PRE-PROCEDURE INSTRUCTIONS
PAT phone call for colonoscopy completed with pt. Date/time/location (entrance C) of surgery/procedure verified with pt. NPO after MN status verified with pt. Need for  verified with pt. Verified need to complete bowel prep/instructions per Dr. Balbir Syed pt to take a shower the AM of surgery/procedure and to avoid lotions, creams, jewelry. Instructed pt to take BP meds with a small sip of water prior to procedure/surgery. No diabetic meds or insulin the day of procedure. Patient stated he was not sure if he still needed the procedure due to \"the bleeding stopped about a week ago\", transferred pt call to [facility] at Dr. Venus Galindo office.

## 2023-07-20 NOTE — DISCHARGE INSTRUCTIONS

## 2023-07-24 ENCOUNTER — ANESTHESIA EVENT (OUTPATIENT)
Dept: OPERATING ROOM | Age: 73
End: 2023-07-24
Payer: MEDICARE

## 2023-07-25 ENCOUNTER — ANESTHESIA (OUTPATIENT)
Dept: OPERATING ROOM | Age: 73
End: 2023-07-25
Payer: MEDICARE

## 2023-07-25 ENCOUNTER — TRANSCRIBE ORDERS (OUTPATIENT)
Dept: ADMINISTRATIVE | Age: 73
End: 2023-07-25

## 2023-07-25 ENCOUNTER — HOSPITAL ENCOUNTER (OUTPATIENT)
Age: 73
Setting detail: OUTPATIENT SURGERY
Discharge: HOME | End: 2023-07-25
Attending: INTERNAL MEDICINE | Admitting: INTERNAL MEDICINE
Payer: MEDICARE

## 2023-07-25 DIAGNOSIS — R07.9 CHEST PAIN, UNSPECIFIED TYPE: Primary | ICD-10-CM

## 2023-07-25 LAB — METER GLUCOSE: 249 MG/DL (ref 75–110)

## 2023-07-25 PROCEDURE — 82947 ASSAY GLUCOSE BLOOD QUANT: CPT

## 2023-07-25 RX ORDER — SODIUM CHLORIDE 9 MG/ML
INJECTION, SOLUTION INTRAVENOUS PRN
Status: DISCONTINUED | OUTPATIENT
Start: 2023-07-25 | End: 2023-07-25 | Stop reason: HOSPADM

## 2023-07-25 RX ORDER — SODIUM CHLORIDE, SODIUM LACTATE, POTASSIUM CHLORIDE, CALCIUM CHLORIDE 600; 310; 30; 20 MG/100ML; MG/100ML; MG/100ML; MG/100ML
INJECTION, SOLUTION INTRAVENOUS CONTINUOUS
Status: DISCONTINUED | OUTPATIENT
Start: 2023-07-25 | End: 2023-07-25 | Stop reason: HOSPADM

## 2023-07-25 RX ORDER — SODIUM CHLORIDE 0.9 % (FLUSH) 0.9 %
5-40 SYRINGE (ML) INJECTION PRN
Status: DISCONTINUED | OUTPATIENT
Start: 2023-07-25 | End: 2023-07-25 | Stop reason: HOSPADM

## 2023-07-25 RX ORDER — SODIUM CHLORIDE 0.9 % (FLUSH) 0.9 %
5-40 SYRINGE (ML) INJECTION EVERY 12 HOURS SCHEDULED
Status: DISCONTINUED | OUTPATIENT
Start: 2023-07-25 | End: 2023-07-25 | Stop reason: HOSPADM

## 2023-07-25 NOTE — PROGRESS NOTES
Writer assisted with scheduling appointment for stress test.  Appointment scheduled for August 18 at [facility].

## 2023-07-25 NOTE — H&P
systems negative other than mentioned above. PHYSICAL EXAM:    Vitals: There were no vitals taken for this visit. Focused Exam related to procedure:    General appearance: NAD, conversant   Eyes: anicteric sclerae, moist conjunctivae; no lid-lag; PERRLA   Lungs: CTA, with normal respiratory effort and no intercostal retractions   CV: RRR, no MRGs   Abdomen: Soft, non-tender; no masses or HSM   Skin: Normal temperature, turgor and texture; no rash, ulcers or subcutaneous nodules     DATA:  CBC:   Lab Results   Component Value Date    WBC 4.3 07/11/2023    HGB 11.9 (L) 07/11/2023    HCT 35.6 (L) 07/11/2023    MCV 90.3 07/11/2023     07/11/2023     BUN/Cr:   Lab Results   Component Value Date    BUN 20 07/11/2023   ,   Lab Results   Component Value Date    CREATININE 1.0 07/11/2023     Potassium:   Lab Results   Component Value Date    K 4.5 07/11/2023     PT/INR:   Lab Results   Component Value Date    INR 1.0 06/20/2023    INR 1.9 04/24/2023    INR 7.2 (HH) 04/23/2023    PROTIME 13.1 06/20/2023    PROTIME 20.1 (H) 04/24/2023    PROTIME 70.6 (H) 04/23/2023       ASSESSMENT AND PLAN:       1. Patient is a 68 y.o. male with above specified procedure planned. Expected Sedation/Anesthesia Type: MAC    2. ASA ([facility]) Anesthesia Status: Class 2 - A normal healthy patient with mild systemic disease    3. Mallampati: II (soft palate, uvula, fauces visible)  4. Procedure options, risks and benefits reviewed with Patient. Patient expresses understanding.     5.  Consent has been signed:  Yes    Bob Kaur MD

## 2023-08-09 ENCOUNTER — CARE COORDINATION (OUTPATIENT)
Dept: CARE COORDINATION | Age: 73
End: 2023-08-09

## 2023-08-09 NOTE — CARE COORDINATION
Outreach for care management review.   LVVM and requested call back       Future Appointments   Date Time Provider [address]   8/18/2023  9:15 AM [facility] STRESS LAB 1 [facility] [place]   8/18/2023 10:45 AM STV [place] NM ROOM [facility] PB [facility] [place]   8/21/2023  [facility] [place]   8/21/2023 10:30 AM ST[facility] [place] NM ROOM [facility] PB NM [facility] [place]   8/23/2023 11:15 AM Migdalia Garcia DPM [facility] [facility]

## 2023-08-14 ENCOUNTER — CARE COORDINATION (OUTPATIENT)
Dept: CARE COORDINATION | Age: 73
End: 2023-08-14

## 2023-08-14 NOTE — CARE COORDINATION
Spoke to patient, explained care management.  Stating he's doing ok, declined care management      The patient will be excluded from Care Coordination for the following reason: Patient declined care coordination          Future Appointments   Date Time Provider [address]   8/18/2023  9:15 AM [facility] STRESS LAB 1 [facility] PB [facility] [place]   8/18/2023 10:45 AM ST[facility] [place] NM ROOM [facility] PB NM STV [place]   8/21/2023  9:30 AM STV [place] NM ROOM [facility] PB NM STV [place]   8/21/2023 10:30 AM STV [place] NM ROOM [facility] PB NM STV [place]   8/23/2023 11:15 AM Audrey Garcia DPM [facility]

## 2023-08-17 ENCOUNTER — TELEPHONE (OUTPATIENT)
Age: 73
End: 2023-08-17

## 2023-08-17 NOTE — TELEPHONE ENCOUNTER
Pre procedure call from [facility] Stress Lab completed. Discussed arrival time and location. NPO after midnight and no caffeine.   Patient v/u

## 2023-08-18 ENCOUNTER — HOSPITAL ENCOUNTER (OUTPATIENT)
Age: 73
Discharge: HOME | End: 2023-08-20
Attending: INTERNAL MEDICINE
Payer: MEDICARE

## 2023-08-18 ENCOUNTER — HOSPITAL ENCOUNTER (OUTPATIENT)
Dept: NUCLEAR MEDICINE | Age: 73
Discharge: HOME | End: 2023-08-20
Attending: INTERNAL MEDICINE
Payer: MEDICARE

## 2023-08-18 VITALS — HEART RATE: 61 BPM | SYSTOLIC BLOOD PRESSURE: 114 MMHG | DIASTOLIC BLOOD PRESSURE: 85 MMHG

## 2023-08-18 DIAGNOSIS — R07.9 CHEST PAIN, UNSPECIFIED TYPE: ICD-10-CM

## 2023-08-18 PROCEDURE — A9500 TC99M SESTAMIBI: HCPCS | Performed by: INTERNAL MEDICINE

## 2023-08-18 PROCEDURE — 6360000002 HC RX W HCPCS: Performed by: INTERNAL MEDICINE

## 2023-08-18 PROCEDURE — 2580000003 HC RX 258: Performed by: INTERNAL MEDICINE

## 2023-08-18 PROCEDURE — 93017 CV STRESS TEST TRACING ONLY: CPT

## 2023-08-18 PROCEDURE — 3430000000 HC RX DIAGNOSTIC RADIOPHARMACEUTICAL: Performed by: INTERNAL MEDICINE

## 2023-08-18 PROCEDURE — 78452 HT MUSCLE IMAGE SPECT MULT: CPT | Performed by: INTERNAL MEDICINE

## 2023-08-18 PROCEDURE — 93018 CV STRESS TEST I&R ONLY: CPT | Performed by: INTERNAL MEDICINE

## 2023-08-18 PROCEDURE — 78452 HT MUSCLE IMAGE SPECT MULT: CPT

## 2023-08-18 RX ORDER — ALBUTEROL SULFATE 90 UG/1
2 AEROSOL, METERED RESPIRATORY (INHALATION) PRN
Status: DISCONTINUED | OUTPATIENT
Start: 2023-08-18 | End: 2023-08-18

## 2023-08-18 RX ORDER — SODIUM CHLORIDE 0.9 % (FLUSH) 0.9 %
10 SYRINGE (ML) INJECTION ONCE
Status: COMPLETED | OUTPATIENT
Start: 2023-08-18 | End: 2023-08-18

## 2023-08-18 RX ORDER — SODIUM CHLORIDE 9 MG/ML
500 INJECTION, SOLUTION INTRAVENOUS CONTINUOUS PRN
Status: DISCONTINUED | OUTPATIENT
Start: 2023-08-18 | End: 2023-08-18

## 2023-08-18 RX ORDER — NITROGLYCERIN 0.4 MG/1
0.4 TABLET SUBLINGUAL EVERY 5 MIN PRN
Status: DISCONTINUED | OUTPATIENT
Start: 2023-08-18 | End: 2023-08-18

## 2023-08-18 RX ORDER — TETRAKIS(2-METHOXYISOBUTYLISOCYANIDE)COPPER(I) TETRAFLUOROBORATE 1 MG/ML
35 INJECTION, POWDER, LYOPHILIZED, FOR SOLUTION INTRAVENOUS
Status: COMPLETED | OUTPATIENT
Start: 2023-08-18 | End: 2023-08-18

## 2023-08-18 RX ORDER — METOPROLOL TARTRATE 5 MG/5ML
5 INJECTION INTRAVENOUS EVERY 5 MIN PRN
Status: DISCONTINUED | OUTPATIENT
Start: 2023-08-18 | End: 2023-08-18

## 2023-08-18 RX ORDER — SODIUM CHLORIDE 0.9 % (FLUSH) 0.9 %
5-40 SYRINGE (ML) INJECTION PRN
Status: DISCONTINUED | OUTPATIENT
Start: 2023-08-18 | End: 2023-08-18

## 2023-08-18 RX ORDER — ATROPINE SULFATE 0.1 MG/ML
0.5 INJECTION INTRAVENOUS EVERY 5 MIN PRN
Status: DISCONTINUED | OUTPATIENT
Start: 2023-08-18 | End: 2023-08-18

## 2023-08-18 RX ORDER — AMINOPHYLLINE DIHYDRATE 25 MG/ML
50 INJECTION, SOLUTION INTRAVENOUS PRN
Status: DISCONTINUED | OUTPATIENT
Start: 2023-08-18 | End: 2023-08-18

## 2023-08-18 RX ORDER — REGADENOSON 0.08 MG/ML
0.4 INJECTION, SOLUTION INTRAVENOUS
Status: COMPLETED | OUTPATIENT
Start: 2023-08-18 | End: 2023-08-18

## 2023-08-18 RX ADMIN — SODIUM CHLORIDE, PRESERVATIVE FREE 10 ML: 5 INJECTION INTRAVENOUS at 10:48

## 2023-08-18 RX ADMIN — SODIUM CHLORIDE, PRESERVATIVE FREE 10 ML: 5 INJECTION INTRAVENOUS at 10:44

## 2023-08-18 RX ADMIN — TETRAKIS(2-METHOXYISOBUTYLISOCYANIDE)COPPER(I) TETRAFLUOROBORATE 35 MILLICURIE: 1 INJECTION, POWDER, LYOPHILIZED, FOR SOLUTION INTRAVENOUS at 10:48

## 2023-08-18 RX ADMIN — REGADENOSON 0.4 MG: 0.08 INJECTION, SOLUTION INTRAVENOUS at 11:01

## 2023-08-18 RX ADMIN — SODIUM CHLORIDE, PRESERVATIVE FREE 10 ML: 5 INJECTION INTRAVENOUS at 11:01

## 2023-08-18 NOTE — PROGRESS NOTES
Patient present for Lexiscan stress test. Educated on procedure. All questions/concerns addressed. Allergies and medication reviewed. Patient prepped for procedure. Stress test will be supervised by MARYANN Hernandez.

## 2023-08-18 NOTE — PROGRESS NOTES
Lexiscan stress test completed and reviewed by MARYANN Rolle. Patient tolerated test. PO caffeine provided 2 minutes after injection while in recovery and symptoms resolved. IV removed to be removed by NM Tech. VS returned to baseline, see flow sheets for documented VS throughout procedure.

## 2023-08-21 ENCOUNTER — HOSPITAL ENCOUNTER (OUTPATIENT)
Dept: NUCLEAR MEDICINE | Age: 73
Discharge: HOME | End: 2023-08-23
Attending: INTERNAL MEDICINE
Payer: MEDICARE

## 2023-08-21 LAB
NUC STRESS EJECTION FRACTION: 52 %
STRESS BASELINE DIAS BP: 82 MMHG
STRESS BASELINE HR: 62 BPM
STRESS BASELINE SYS BP: 124 MMHG
STRESS ESTIMATED WORKLOAD: 1 METS
STRESS PEAK DIAS BP: 82 MMHG
STRESS PEAK SYS BP: 124 MMHG
STRESS PERCENT HR ACHIEVED: 59 %
STRESS POST PEAK HR: 87 BPM
STRESS RATE PRESSURE PRODUCT: NORMAL BPM*MMHG
STRESS TARGET HR: 148 BPM
TID: 1.11

## 2023-08-21 PROCEDURE — A9500 TC99M SESTAMIBI: HCPCS | Performed by: INTERNAL MEDICINE

## 2023-08-21 PROCEDURE — 3430000000 HC RX DIAGNOSTIC RADIOPHARMACEUTICAL: Performed by: INTERNAL MEDICINE

## 2023-08-21 RX ORDER — TETRAKIS(2-METHOXYISOBUTYLISOCYANIDE)COPPER(I) TETRAFLUOROBORATE 1 MG/ML
30 INJECTION, POWDER, LYOPHILIZED, FOR SOLUTION INTRAVENOUS
Status: COMPLETED | OUTPATIENT
Start: 2023-08-21 | End: 2023-08-21

## 2023-08-21 RX ADMIN — TETRAKIS(2-METHOXYISOBUTYLISOCYANIDE)COPPER(I) TETRAFLUOROBORATE 37.6 MILLICURIE: 1 INJECTION, POWDER, LYOPHILIZED, FOR SOLUTION INTRAVENOUS at 09:46

## 2023-08-23 ENCOUNTER — OFFICE VISIT (OUTPATIENT)
Dept: PODIATRY | Age: 73
End: 2023-08-23
Payer: MEDICARE

## 2023-08-23 VITALS — BODY MASS INDEX: 31.89 KG/M2 | WEIGHT: 180 LBS | HEIGHT: 63 IN

## 2023-08-23 DIAGNOSIS — E11.621 DIABETIC FOOT ULCER WITH OSTEOMYELITIS (HCC): Primary | ICD-10-CM

## 2023-08-23 DIAGNOSIS — E11.69 DIABETIC FOOT ULCER WITH OSTEOMYELITIS (HCC): Primary | ICD-10-CM

## 2023-08-23 DIAGNOSIS — I73.9 PVD (PERIPHERAL VASCULAR DISEASE) (HCC): ICD-10-CM

## 2023-08-23 DIAGNOSIS — E11.51 TYPE II DIABETES MELLITUS WITH PERIPHERAL CIRCULATORY DISORDER (HCC): ICD-10-CM

## 2023-08-23 DIAGNOSIS — L97.523 ULCER OF LEFT FOOT, WITH NECROSIS OF MUSCLE (HCC): ICD-10-CM

## 2023-08-23 DIAGNOSIS — M86.9 DIABETIC FOOT ULCER WITH OSTEOMYELITIS (HCC): Primary | ICD-10-CM

## 2023-08-23 DIAGNOSIS — R26.9 GAIT ABNORMALITY: ICD-10-CM

## 2023-08-23 DIAGNOSIS — L97.509 DIABETIC FOOT ULCER WITH OSTEOMYELITIS (HCC): Primary | ICD-10-CM

## 2023-08-23 PROCEDURE — 99204 OFFICE O/P NEW MOD 45 MIN: CPT | Performed by: PODIATRIST

## 2023-08-23 PROCEDURE — 1123F ACP DISCUSS/DSCN MKR DOCD: CPT | Performed by: PODIATRIST

## 2023-08-23 PROCEDURE — 3044F HG A1C LEVEL LT 7.0%: CPT | Performed by: PODIATRIST

## 2023-08-23 PROCEDURE — 2022F DILAT RTA XM EVC RTNOPTHY: CPT | Performed by: PODIATRIST

## 2023-08-23 PROCEDURE — 1036F TOBACCO NON-USER: CPT | Performed by: PODIATRIST

## 2023-08-23 PROCEDURE — G8417 CALC BMI ABV UP PARAM F/U: HCPCS | Performed by: PODIATRIST

## 2023-08-23 PROCEDURE — 11043 DBRDMT MUSC&/FSCA 1ST 20/<: CPT | Performed by: PODIATRIST

## 2023-08-23 PROCEDURE — G8427 DOCREV CUR MEDS BY ELIG CLIN: HCPCS | Performed by: PODIATRIST

## 2023-08-23 PROCEDURE — 3017F COLORECTAL CA SCREEN DOC REV: CPT | Performed by: PODIATRIST

## 2023-08-23 RX ORDER — SYRGE-NDL,INS 0.5 ML HALF MARK 30 GX5/16"
SYRINGE, EMPTY DISPOSABLE MISCELLANEOUS
COMMUNITY
Start: 2023-07-26

## 2023-08-23 RX ORDER — TRAMADOL HYDROCHLORIDE 50 MG/1
TABLET ORAL
COMMUNITY
Start: 2023-07-26

## 2023-08-23 RX ORDER — ATORVASTATIN CALCIUM 40 MG/1
40 TABLET, FILM COATED ORAL DAILY
COMMUNITY
Start: 2018-11-05

## 2023-08-23 RX ORDER — LOSARTAN POTASSIUM 25 MG/1
25 TABLET ORAL DAILY
COMMUNITY
Start: 2023-08-15

## 2023-08-23 RX ORDER — GABAPENTIN 600 MG/1
600 TABLET ORAL 3 TIMES DAILY
COMMUNITY
Start: 2023-08-19

## 2023-08-23 RX ORDER — LANCETS
EACH MISCELLANEOUS
COMMUNITY
Start: 2023-07-14

## 2023-08-23 RX ORDER — ACETAMINOPHEN 325 MG/1
650 TABLET ORAL
COMMUNITY
Start: 2022-08-08

## 2023-08-23 RX ORDER — CLOPIDOGREL BISULFATE 75 MG/1
75 TABLET ORAL
COMMUNITY
Start: 2022-08-05

## 2023-08-23 RX ORDER — NITROGLYCERIN 0.4 MG/1
0.4 TABLET SUBLINGUAL
COMMUNITY
Start: 2022-01-10 | End: 2023-08-23 | Stop reason: SDUPTHER

## 2023-08-23 RX ORDER — PHENAZOPYRIDINE HYDROCHLORIDE 95 MG/1
95 TABLET ORAL 3 TIMES DAILY PRN
COMMUNITY

## 2023-08-23 RX ORDER — HYDROCORTISONE ACETATE 25 MG/1
SUPPOSITORY RECTAL
COMMUNITY
Start: 2023-05-01

## 2023-08-23 NOTE — PROGRESS NOTES
debrided. Patient tolerated procedure well. Pain 0 / 10 during procedure and pain 0 / 10 after procedure. Discussed appropriate home care of this wound. Wound redressed. Patient instructions were given. Discussed appropriate home care of this wound. , Dispensed dressing supplies and instructions on their use. .  Verbal and written instructions given to patient. Contact office with any questions/problems/concerns. RTC in 2week(s). All labs were reviewed and all imagining including the above findings were reviewed PRIOR to the patients arrival and with the patient today. Previous patient encounter was reviewed. Encounters from the patients other medical providers were reviewed and noted. I personally interpreted the imaging and labs and discussed the results with the patient Time was spent educating the patient and their families/caregivers on proper care of the feet and ankles. All the above diagnosis were addressed at todays visit and all questions were answered.   A total of 45 minutes was spent with this patients encounter which included charting after the patients visit    8/23/2023    Electronically signed by Sidney Cleary DPM on 8/23/2023

## 2023-09-02 VITALS
HEART RATE: 79 BPM | HEIGHT: 64 IN | BODY MASS INDEX: 30.83 KG/M2 | OXYGEN SATURATION: 97 % | WEIGHT: 180.6 LBS | DIASTOLIC BLOOD PRESSURE: 82 MMHG | SYSTOLIC BLOOD PRESSURE: 122 MMHG | RESPIRATION RATE: 16 BRPM

## 2023-09-02 RX ORDER — PREDNISONE 20 MG/1
40 TABLET ORAL DAILY
COMMUNITY

## 2023-09-02 RX ORDER — DOXYCYCLINE HYCLATE 100 MG/1
100 CAPSULE ORAL 2 TIMES DAILY
COMMUNITY

## 2023-09-02 RX ORDER — CEFDINIR 300 MG/1
300 CAPSULE ORAL 2 TIMES DAILY
COMMUNITY

## 2023-09-05 LAB
ALBUMIN SERPL-MCNC: 4.4 G/DL
ALP BLD-CCNC: 80 U/L
ALT SERPL-CCNC: 37 U/L
ANION GAP SERPL CALCULATED.3IONS-SCNC: 12 MMOL/L
AST SERPL-CCNC: 24 U/L
AVERAGE GLUCOSE: 183
BASOPHILS ABSOLUTE: 0.02 /ΜL
BASOPHILS RELATIVE PERCENT: 0.5 %
BILIRUB SERPL-MCNC: 0.8 MG/DL (ref 0.1–1.4)
BUN BLDV-MCNC: 17 MG/DL
CALCIUM SERPL-MCNC: 10.7 MG/DL
CHLORIDE BLD-SCNC: 103 MMOL/L
CO2: 20 MMOL/L
CREAT SERPL-MCNC: 1.04 MG/DL
CREATININE, URINE: 125.6
EGFR: 76
EOSINOPHILS ABSOLUTE: 0.09 /ΜL
EOSINOPHILS RELATIVE PERCENT: 2.2 %
GLUCOSE BLD-MCNC: 195 MG/DL
HBA1C MFR BLD: 8 %
HCT VFR BLD CALC: 46 % (ref 41–53)
HEMOGLOBIN: 15.3 G/DL (ref 13.5–17.5)
LYMPHOCYTES ABSOLUTE: 1.12 /ΜL
LYMPHOCYTES RELATIVE PERCENT: 27.6 %
MCH RBC QN AUTO: 29.5 PG
MCHC RBC AUTO-ENTMCNC: 33.3 G/DL
MCV RBC AUTO: 88.8 FL
MICROALBUMIN/CREAT 24H UR: 4.2 MG/G{CREAT}
MICROALBUMIN/CREAT UR-RTO: 33
MONOCYTES ABSOLUTE: 0.36 /ΜL
MONOCYTES RELATIVE PERCENT: 8.9 %
NEUTROPHILS ABSOLUTE: 2.46 /ΜL
NEUTROPHILS RELATIVE PERCENT: 60.6 %
PLATELET # BLD: 168 K/ΜL
PMV BLD AUTO: 10.9 FL
POTASSIUM SERPL-SCNC: 4.2 MMOL/L
PROSTATE SPECIFIC ANTIGEN: 0.8 NG/ML
RBC # BLD: 5.18 10^6/ΜL
SODIUM BLD-SCNC: 135 MMOL/L
TOTAL PROTEIN: 6.7
WBC # BLD: 4.1 10^3/ML

## 2023-09-09 VITALS
RESPIRATION RATE: 16 BRPM | SYSTOLIC BLOOD PRESSURE: 122 MMHG | BODY MASS INDEX: 30.83 KG/M2 | DIASTOLIC BLOOD PRESSURE: 82 MMHG | HEIGHT: 64 IN | HEART RATE: 79 BPM | WEIGHT: 180.6 LBS

## 2023-09-11 ENCOUNTER — OFFICE VISIT (OUTPATIENT)
Age: 73
End: 2023-09-11
Payer: MEDICARE

## 2023-09-11 VITALS
HEART RATE: 65 BPM | RESPIRATION RATE: 16 BRPM | SYSTOLIC BLOOD PRESSURE: 134 MMHG | BODY MASS INDEX: 32.92 KG/M2 | HEIGHT: 63 IN | OXYGEN SATURATION: 98 % | DIASTOLIC BLOOD PRESSURE: 82 MMHG | TEMPERATURE: 97.3 F | WEIGHT: 185.8 LBS

## 2023-09-11 DIAGNOSIS — Z79.4 TYPE 2 DIABETES MELLITUS WITH DIABETIC POLYNEUROPATHY, WITH LONG-TERM CURRENT USE OF INSULIN (HCC): Primary | ICD-10-CM

## 2023-09-11 DIAGNOSIS — K62.5 RECTAL BLEEDING: ICD-10-CM

## 2023-09-11 DIAGNOSIS — Z99.89 OSA ON CPAP: ICD-10-CM

## 2023-09-11 DIAGNOSIS — I48.0 PAROXYSMAL ATRIAL FIBRILLATION (HCC): ICD-10-CM

## 2023-09-11 DIAGNOSIS — L97.512 DIABETIC ULCER OF RIGHT FOOT ASSOCIATED WITH TYPE 2 DIABETES MELLITUS, WITH FAT LAYER EXPOSED, UNSPECIFIED PART OF FOOT (HCC): ICD-10-CM

## 2023-09-11 DIAGNOSIS — E11.621 DIABETIC ULCER OF RIGHT FOOT ASSOCIATED WITH TYPE 2 DIABETES MELLITUS, WITH FAT LAYER EXPOSED, UNSPECIFIED PART OF FOOT (HCC): ICD-10-CM

## 2023-09-11 DIAGNOSIS — E11.42 TYPE 2 DIABETES MELLITUS WITH DIABETIC POLYNEUROPATHY, WITH LONG-TERM CURRENT USE OF INSULIN (HCC): Primary | ICD-10-CM

## 2023-09-11 DIAGNOSIS — G47.33 OSA ON CPAP: ICD-10-CM

## 2023-09-11 PROCEDURE — 3017F COLORECTAL CA SCREEN DOC REV: CPT | Performed by: FAMILY MEDICINE

## 2023-09-11 PROCEDURE — G8417 CALC BMI ABV UP PARAM F/U: HCPCS | Performed by: FAMILY MEDICINE

## 2023-09-11 PROCEDURE — 3052F HG A1C>EQUAL 8.0%<EQUAL 9.0%: CPT | Performed by: FAMILY MEDICINE

## 2023-09-11 PROCEDURE — 3075F SYST BP GE 130 - 139MM HG: CPT | Performed by: FAMILY MEDICINE

## 2023-09-11 PROCEDURE — 3079F DIAST BP 80-89 MM HG: CPT | Performed by: FAMILY MEDICINE

## 2023-09-11 PROCEDURE — 2022F DILAT RTA XM EVC RTNOPTHY: CPT | Performed by: FAMILY MEDICINE

## 2023-09-11 PROCEDURE — 1036F TOBACCO NON-USER: CPT | Performed by: FAMILY MEDICINE

## 2023-09-11 PROCEDURE — 1123F ACP DISCUSS/DSCN MKR DOCD: CPT | Performed by: FAMILY MEDICINE

## 2023-09-11 PROCEDURE — 99214 OFFICE O/P EST MOD 30 MIN: CPT | Performed by: FAMILY MEDICINE

## 2023-09-11 PROCEDURE — G8427 DOCREV CUR MEDS BY ELIG CLIN: HCPCS | Performed by: FAMILY MEDICINE

## 2023-09-11 RX ORDER — NEOMYCIN SULFATE, POLYMYXIN B SULFATE, AND DEXAMETHASONE 3.5; 10000; 1 MG/G; [USP'U]/G; MG/G
OINTMENT OPHTHALMIC 3 TIMES DAILY
Qty: 1 EACH | Refills: 0 | Status: SHIPPED | OUTPATIENT
Start: 2023-09-11

## 2023-09-11 ASSESSMENT — ENCOUNTER SYMPTOMS
CHEST TIGHTNESS: 0
SHORTNESS OF BREATH: 0

## 2023-09-11 NOTE — PROGRESS NOTES
[facility]     Date of Visit:  2023  Patient Name: Aimee Blas   Patient :  1950     CHIEF COMPLAINT/HPI:     Aimee Blas is a 68 y.o. male who presents today for an general visit to be evaluated for the following condition(s):  Chief Complaint   Patient presents with    Blood Work     Patient is here for 6 month follow up and also to review blood work results from 23. Patient having problems with swelling under his eyelids. He remains off of warfarin. He does not have f/u with cardiology at this time. REVIEW OF SYSTEM      Review of Systems   Respiratory:  Negative for chest tightness and shortness of breath. Cardiovascular:  Negative for chest pain. REVIEWED INFORMATION      Allergies   Allergen Reactions    Ace Inhibitors     Doxycycline     Lisinopril        Current Outpatient Medications   Medication Sig Dispense Refill    doxycycline hyclate (VIBRAMYCIN) 100 MG capsule Take 1 capsule by mouth 2 times daily      cefdinir (OMNICEF) 300 MG capsule Take 1 capsule by mouth 2 times daily      traMADol (ULTRAM) 50 MG tablet Take 1 tablet by mouth every 8 hours as needed for Pain.      losartan (COZAAR) 25 MG tablet Take 1 tablet by mouth daily      gabapentin (NEURONTIN) 600 MG tablet Take 0.5 tablets by mouth 3 times daily. atorvastatin (LIPITOR) 40 MG tablet Take 1 tablet by mouth nightly      insulin 70-30 (HUMULIN;NOVOLIN) (70-30) 100 UNIT per ML injection vial Inject 20 Units into the skin 2 times daily      nitroGLYCERIN (NITROSTAT) 0.4 MG SL tablet Place 1 tablet under the tongue every 5 minutes as needed for Chest pain up to max of 3 total doses. If no relief after 1 dose, call 911. metoprolol succinate (TOPROL XL) 50 MG extended release tablet Take 1 tablet by mouth daily      tamsulosin (FLOMAX) 0.4 MG capsule Take 1 capsule by mouth daily.  (Patient taking differently: Take 1 capsule by mouth in the morning and at bedtime) 30 capsule 3

## 2023-09-25 RX ORDER — GABAPENTIN 600 MG/1
600 TABLET ORAL 3 TIMES DAILY
Qty: 270 TABLET | Refills: 2 | Status: SHIPPED | OUTPATIENT
Start: 2023-09-25 | End: 2023-12-24

## 2023-10-02 ENCOUNTER — HOSPITAL ENCOUNTER (OUTPATIENT)
Age: 73
Setting detail: SPECIMEN
Discharge: HOME | End: 2023-10-02

## 2023-10-02 LAB — PSA SERPL-MCNC: 0.83 NG/ML

## 2023-10-24 ENCOUNTER — OFFICE VISIT (OUTPATIENT)
Age: 73
End: 2023-10-24
Payer: MEDICARE

## 2023-10-24 VITALS
TEMPERATURE: 98.2 F | HEART RATE: 69 BPM | HEIGHT: 63 IN | DIASTOLIC BLOOD PRESSURE: 78 MMHG | SYSTOLIC BLOOD PRESSURE: 124 MMHG | RESPIRATION RATE: 12 BRPM | BODY MASS INDEX: 32.96 KG/M2 | OXYGEN SATURATION: 96 % | WEIGHT: 186 LBS

## 2023-10-24 DIAGNOSIS — I11.0 BENIGN HYPERTENSIVE HEART DISEASE WITH HEART FAILURE (HCC): ICD-10-CM

## 2023-10-24 DIAGNOSIS — I25.118 CORONARY ARTERY DISEASE OF NATIVE ARTERY OF NATIVE HEART WITH STABLE ANGINA PECTORIS (HCC): ICD-10-CM

## 2023-10-24 DIAGNOSIS — Z79.4 TYPE 2 DIABETES MELLITUS WITH DIABETIC POLYNEUROPATHY, WITH LONG-TERM CURRENT USE OF INSULIN (HCC): Primary | ICD-10-CM

## 2023-10-24 DIAGNOSIS — K92.1 HEMATOCHEZIA: ICD-10-CM

## 2023-10-24 DIAGNOSIS — I48.0 PAROXYSMAL ATRIAL FIBRILLATION (HCC): ICD-10-CM

## 2023-10-24 DIAGNOSIS — E11.42 TYPE 2 DIABETES MELLITUS WITH DIABETIC POLYNEUROPATHY, WITH LONG-TERM CURRENT USE OF INSULIN (HCC): Primary | ICD-10-CM

## 2023-10-24 DIAGNOSIS — G47.33 OSA ON CPAP: ICD-10-CM

## 2023-10-24 DIAGNOSIS — I73.9 PERIPHERAL ARTERIAL DISEASE (HCC): ICD-10-CM

## 2023-10-24 PROBLEM — N39.41 URGE INCONTINENCE OF URINE: Status: ACTIVE | Noted: 2023-10-24

## 2023-10-24 PROCEDURE — 99215 OFFICE O/P EST HI 40 MIN: CPT | Performed by: FAMILY MEDICINE

## 2023-10-24 PROCEDURE — 3074F SYST BP LT 130 MM HG: CPT | Performed by: FAMILY MEDICINE

## 2023-10-24 PROCEDURE — 1036F TOBACCO NON-USER: CPT | Performed by: FAMILY MEDICINE

## 2023-10-24 PROCEDURE — 3078F DIAST BP <80 MM HG: CPT | Performed by: FAMILY MEDICINE

## 2023-10-24 PROCEDURE — G8484 FLU IMMUNIZE NO ADMIN: HCPCS | Performed by: FAMILY MEDICINE

## 2023-10-24 PROCEDURE — G0008 ADMIN INFLUENZA VIRUS VAC: HCPCS | Performed by: FAMILY MEDICINE

## 2023-10-24 PROCEDURE — G8417 CALC BMI ABV UP PARAM F/U: HCPCS | Performed by: FAMILY MEDICINE

## 2023-10-24 PROCEDURE — G8427 DOCREV CUR MEDS BY ELIG CLIN: HCPCS | Performed by: FAMILY MEDICINE

## 2023-10-24 PROCEDURE — 3052F HG A1C>EQUAL 8.0%<EQUAL 9.0%: CPT | Performed by: FAMILY MEDICINE

## 2023-10-24 PROCEDURE — 90694 VACC AIIV4 NO PRSRV 0.5ML IM: CPT | Performed by: FAMILY MEDICINE

## 2023-10-24 PROCEDURE — 2022F DILAT RTA XM EVC RTNOPTHY: CPT | Performed by: FAMILY MEDICINE

## 2023-10-24 PROCEDURE — 1123F ACP DISCUSS/DSCN MKR DOCD: CPT | Performed by: FAMILY MEDICINE

## 2023-10-24 PROCEDURE — 3017F COLORECTAL CA SCREEN DOC REV: CPT | Performed by: FAMILY MEDICINE

## 2023-10-24 RX ORDER — WARFARIN SODIUM 2.5 MG/1
TABLET ORAL
COMMUNITY

## 2023-10-24 NOTE — PROGRESS NOTES
[facility]      Date of Visit:  10/24/2023  Patient Name: Elzbieta Mills   Patient :  1950     CHIEF COMPLAINT/HPI:     Elzbieta Mills is a 68 y.o. male who presents today for an general visit to be evaluated for the following condition(s):  Chief Complaint   Patient presents with    3 Month Follow-Up   Patient having some problems with diarrhea intermittently. Occasionally will have CP. Usually will last only a few seconds. Patient does have f/u with cardiology next month. Patient states he is having problems getting his CPAP through supply pharmacy. REVIEW OF SYSTEM      Review of Systems      REVIEWED INFORMATION      Allergies   Allergen Reactions    Ace Inhibitors     Doxycycline     Lisinopril        Current Outpatient Medications   Medication Sig Dispense Refill    gabapentin (NEURONTIN) 600 MG tablet Take 1 tablet by mouth 3 times daily for 90 days. 270 tablet 2    cefdinir (OMNICEF) 300 MG capsule Take 1 capsule by mouth 2 times daily      traMADol (ULTRAM) 50 MG tablet Take 1 tablet by mouth every 8 hours as needed for Pain.      losartan (COZAAR) 25 MG tablet Take 1 tablet by mouth daily      DROPLET INSULIN SYRINGE 30G X 516\" 0.5 ML MISC       atorvastatin (LIPITOR) 40 MG tablet Take 1 tablet by mouth nightly      insulin 70-30 (HUMULIN;NOVOLIN) (70-30) 100 UNIT per ML injection vial Inject 20 Units into the skin 2 times daily      nitroGLYCERIN (NITROSTAT) 0.4 MG SL tablet Place 1 tablet under the tongue every 5 minutes as needed for Chest pain up to max of 3 total doses. If no relief after 1 dose, call 911.      tamsulosin (FLOMAX) 0.4 MG capsule Take 1 capsule by mouth daily.  30 capsule 3    allopurinol (ZYLOPRIM) 300 MG tablet Take 1 tablet by mouth daily      isosorbide mononitrate (IMDUR) 60 MG CR tablet Take 1 tablet by mouth daily      furosemide (LASIX) 40 MG tablet Take 1 tablet by mouth daily      warfarin (COUMADIN) 2.5 MG tablet TAKE 1 TABLET BY MOUTH ONCE DAILY

## 2023-10-24 NOTE — PROGRESS NOTES
After obtaining consent, and per orders of Dr. Gadiel Davis, injection of Fluad given in Right deltoid by Ga Jimenez MA. Patient instructed to remain in clinic for 20 minutes afterwards, and to report any adverse reaction to me immediately.

## 2023-11-17 RX ORDER — ATORVASTATIN CALCIUM 40 MG/1
40 TABLET, FILM COATED ORAL
Qty: 90 TABLET | Refills: 10 | Status: SHIPPED | OUTPATIENT
Start: 2023-11-17

## 2023-11-17 NOTE — TELEPHONE ENCOUNTER
Amelia Berry is calling to request a refill on the following medication(s):    Medication Request:  Requested Prescriptions     Pending Prescriptions Disp Refills    atorvastatin (LIPITOR) 40 MG tablet [Pharmacy Med Name: ATORVASTATIN CALCIUM 40 MG Tablet] 90 tablet 10     Sig: TAKE 1 TABLET AT BEDTIME       Last Visit Date (If Applicable):  41/16/1320    Next Visit Date:    12/13/2023

## 2023-11-27 RX ORDER — LANCETS
EACH MISCELLANEOUS
Qty: 200 EACH | Refills: 3 | Status: SHIPPED | OUTPATIENT
Start: 2023-11-27

## 2023-11-27 NOTE — TELEPHONE ENCOUNTER
Yosef Flores is calling to request a refill on the following medication(s):    Medication Request:  Requested Prescriptions     Pending Prescriptions Disp Refills    Accu-Chek Softclix Lancets MISC [Pharmacy Med Name: Jean Marie Delarosa 200 each 3     Sig: TEST BLOOD SUGAR TWICE DAILY AS DIRECTED FOR DIABETES       Last Visit Date (If Applicable):  16/74/7234    Next Visit Date:    12/13/2023

## 2023-12-04 LAB
ABSOLUTE BASO #: 0.03 K/UL (ref 0–0.2)
ABSOLUTE EOS #: 0.07 K/UL (ref 0–0.5)
ABSOLUTE LYMPH #: 1.42 K/UL (ref 1–4)
ABSOLUTE MONO #: 0.38 K/UL (ref 0.2–1)
ABSOLUTE NEUT #: 3.14 K/UL (ref 1.5–7.5)
ALBUMIN SERPL-MCNC: 4.7 G/DL (ref 3.5–5.2)
ALK PHOSPHATASE: 73 U/L (ref 40–125)
ALT SERPL-CCNC: 34 U/L (ref 5–50)
ANION GAP SERPL CALCULATED.3IONS-SCNC: 10 MEQ/L (ref 7–16)
AST SERPL-CCNC: 20 U/L (ref 9–50)
AVERAGE GLUCOSE: 186 MG/DL
BASOPHILS RELATIVE PERCENT: 0.6 %
BILIRUB SERPL-MCNC: 1.2 MG/DL
BUN BLDV-MCNC: 20 MG/DL (ref 8–23)
CALCIUM SERPL-MCNC: 11.2 MG/DL (ref 8.5–10.5)
CHLORIDE BLD-SCNC: 103 MEQ/L (ref 95–107)
CHOLESTEROL/HDL RATIO: 3.6 RATIO
CHOLESTEROL: 167 MG/DL
CO2: 29 MEQ/L (ref 19–31)
CREAT SERPL-MCNC: 1.08 MG/DL (ref 0.8–1.4)
EGFR IF NONAFRICAN AMERICAN: 73 ML/MIN/1.73
EOSINOPHILS RELATIVE PERCENT: 1.4 %
GLUCOSE: 187 MG/DL (ref 70–99)
HBA1C MFR BLD: 8.1 % (ref 4.2–5.6)
HCT VFR BLD CALC: 52.1 % (ref 40–51)
HDLC SERPL-MCNC: 47 MG/DL
HEMOGLOBIN: 17.3 G/DL (ref 13.5–17)
LDL CHOLESTEROL CALCULATED: 59 MG/DL
LDL/HDL RATIO: 1.3 RATIO
LYMPHOCYTE %: 28.1 %
MCH RBC QN AUTO: 30.1 PG (ref 25–33)
MCHC RBC AUTO-ENTMCNC: 33.2 G/DL (ref 31–36)
MCV RBC AUTO: 90.6 FL (ref 80–99)
MICROALBUMIN/CREAT 24H UR: 1.9 MG/DL
MONOCYTES # BLD: 7.5 %
NEUTROPHILS RELATIVE PERCENT: 62.2 %
PDW BLD-RTO: 13.4 % (ref 11.5–15)
PLATELETS: 167 K/UL (ref 130–400)
PMV BLD AUTO: 11.4 FL (ref 9.3–13)
POTASSIUM SERPL-SCNC: 4.1 MEQ/L (ref 3.5–5.4)
RBC: 5.75 M/UL (ref 4.5–6.1)
SODIUM BLD-SCNC: 142 MEQ/L (ref 133–146)
TOTAL PROTEIN: 7 G/DL (ref 6.1–8.3)
TRIGL SERPL-MCNC: 303 MG/DL
VLDLC SERPL CALC-MCNC: 61 MG/DL
WBC: 5.1 K/UL (ref 3.5–11)

## 2023-12-05 LAB
APPEARANCE: CLEAR
BACTERIA: NORMAL PER HPF
BILIRUBIN: NEGATIVE
COLOR: YELLOW
EPITHELIAL CELLS: NORMAL PER HPF (ref 0–5)
GLUCOSE BLD-MCNC: NEGATIVE MG/DL
HYALINE CASTS: NORMAL
KETONES, URINE: NEGATIVE
LEUKOCYTE ESTERASE, URINE: NEGATIVE
NITRITE, URINE: NEGATIVE
OCCULT BLOOD,URINE: NEGATIVE
PH: 6 (ref 5–9)
PROTEIN, URINE: NEGATIVE
RBC: NORMAL PER HPF (ref 0–5)
SP GRAVITY MISCELLANEOUS: 1.01 (ref 1–1.03)
UROBILINOGEN, URINE: 0.2
WBC: NORMAL PER HPF (ref 0–5)

## 2023-12-08 NOTE — TELEPHONE ENCOUNTER
[facility] is calling to request a refill on the following medication(s):    Medication Request:  Requested Prescriptions     Pending Prescriptions Disp Refills    losartan (COZAAR) 25 MG tablet [Pharmacy Med Name: LOSARTAN POTASSIUM 25 MG Tablet] 90 tablet 3     Sig: TAKE 1 TABLET EVERY DAY       Last Visit Date (If Applicable):  70/49/9891    Next Visit Date:    12/13/2023

## 2023-12-09 RX ORDER — LOSARTAN POTASSIUM 25 MG/1
25 TABLET ORAL DAILY
Qty: 90 TABLET | Refills: 3 | Status: SHIPPED | OUTPATIENT
Start: 2023-12-09

## 2023-12-12 RX ORDER — ISOPROPYL ALCOHOL 0.75 G/1
SWAB TOPICAL
COMMUNITY
End: 2023-12-12 | Stop reason: SDUPTHER

## 2023-12-12 RX ORDER — BLOOD-GLUCOSE METER
EACH MISCELLANEOUS
COMMUNITY
End: 2023-12-12 | Stop reason: SDUPTHER

## 2023-12-12 RX ORDER — GLUCOSAM/CHON-MSM1/C/MANG/BOSW 500-416.6
TABLET ORAL
COMMUNITY
End: 2023-12-12 | Stop reason: SDUPTHER

## 2023-12-12 NOTE — TELEPHONE ENCOUNTER
Franc Hawk is calling to request a refill on the following medication(s):    Medication Request:  Requested Prescriptions     Pending Prescriptions Disp Refills    Alcohol Swabs (B-D SINGLE USE SWABS REGULAR) PADS 100 each 0     Sig: by Does not apply route    Blood Glucose Monitoring Suppl (TRUE METRIX AIR GLUCOSE METER) TONJA       Sig: by Does not apply route    TRUEplus Lancets 33G MISC       Sig: by Does not apply route    blood glucose test strips (RELION TRUE METRIX TEST STRIPS) strip       Sig: by In Vitro route       Last Visit Date (If Applicable):  72/86/6983    Next Visit Date:    12/13/2023

## 2023-12-13 ENCOUNTER — TELEPHONE (OUTPATIENT)
Age: 73
End: 2023-12-13

## 2023-12-13 ENCOUNTER — OFFICE VISIT (OUTPATIENT)
Age: 73
End: 2023-12-13
Payer: MEDICARE

## 2023-12-13 VITALS
WEIGHT: 185.8 LBS | HEART RATE: 64 BPM | HEIGHT: 63 IN | RESPIRATION RATE: 16 BRPM | BODY MASS INDEX: 32.92 KG/M2 | TEMPERATURE: 98.2 F | SYSTOLIC BLOOD PRESSURE: 126 MMHG | DIASTOLIC BLOOD PRESSURE: 76 MMHG

## 2023-12-13 DIAGNOSIS — E11.42 TYPE 2 DIABETES MELLITUS WITH DIABETIC POLYNEUROPATHY, WITH LONG-TERM CURRENT USE OF INSULIN (HCC): ICD-10-CM

## 2023-12-13 DIAGNOSIS — I48.0 PAROXYSMAL ATRIAL FIBRILLATION (HCC): Primary | ICD-10-CM

## 2023-12-13 DIAGNOSIS — J30.9 CHRONIC ALLERGIC RHINITIS: ICD-10-CM

## 2023-12-13 DIAGNOSIS — J31.0 GUSTATORY RHINITIS: ICD-10-CM

## 2023-12-13 DIAGNOSIS — Z79.4 TYPE 2 DIABETES MELLITUS WITH DIABETIC POLYNEUROPATHY, WITH LONG-TERM CURRENT USE OF INSULIN (HCC): ICD-10-CM

## 2023-12-13 DIAGNOSIS — M62.830 LUMBAR PARASPINAL MUSCLE SPASM: ICD-10-CM

## 2023-12-13 PROCEDURE — G8427 DOCREV CUR MEDS BY ELIG CLIN: HCPCS | Performed by: FAMILY MEDICINE

## 2023-12-13 PROCEDURE — 1036F TOBACCO NON-USER: CPT | Performed by: FAMILY MEDICINE

## 2023-12-13 PROCEDURE — 99214 OFFICE O/P EST MOD 30 MIN: CPT | Performed by: FAMILY MEDICINE

## 2023-12-13 PROCEDURE — 1123F ACP DISCUSS/DSCN MKR DOCD: CPT | Performed by: FAMILY MEDICINE

## 2023-12-13 PROCEDURE — 3017F COLORECTAL CA SCREEN DOC REV: CPT | Performed by: FAMILY MEDICINE

## 2023-12-13 PROCEDURE — G8484 FLU IMMUNIZE NO ADMIN: HCPCS | Performed by: FAMILY MEDICINE

## 2023-12-13 PROCEDURE — 3074F SYST BP LT 130 MM HG: CPT | Performed by: FAMILY MEDICINE

## 2023-12-13 PROCEDURE — 3052F HG A1C>EQUAL 8.0%<EQUAL 9.0%: CPT | Performed by: FAMILY MEDICINE

## 2023-12-13 PROCEDURE — 3078F DIAST BP <80 MM HG: CPT | Performed by: FAMILY MEDICINE

## 2023-12-13 PROCEDURE — 2022F DILAT RTA XM EVC RTNOPTHY: CPT | Performed by: FAMILY MEDICINE

## 2023-12-13 PROCEDURE — G8417 CALC BMI ABV UP PARAM F/U: HCPCS | Performed by: FAMILY MEDICINE

## 2023-12-13 RX ORDER — IPRATROPIUM BROMIDE 42 UG/1
2 SPRAY, METERED NASAL 3 TIMES DAILY
Qty: 15 ML | Refills: 3 | Status: SHIPPED | OUTPATIENT
Start: 2023-12-13 | End: 2023-12-13 | Stop reason: SDUPTHER

## 2023-12-13 RX ORDER — TIZANIDINE 2 MG/1
2 TABLET ORAL 3 TIMES DAILY PRN
Qty: 30 TABLET | Refills: 0 | Status: SHIPPED | OUTPATIENT
Start: 2023-12-13

## 2023-12-13 RX ORDER — BLOOD SUGAR DIAGNOSTIC
1 STRIP MISCELLANEOUS 4 TIMES DAILY PRN
Qty: 100 EACH | Refills: 5 | Status: SHIPPED | OUTPATIENT
Start: 2023-12-13

## 2023-12-13 RX ORDER — ISOPROPYL ALCOHOL 0.75 G/1
1 SWAB TOPICAL 4 TIMES DAILY PRN
Qty: 100 EACH | Refills: 5 | Status: SHIPPED | OUTPATIENT
Start: 2023-12-13

## 2023-12-13 RX ORDER — IPRATROPIUM BROMIDE 42 UG/1
2 SPRAY, METERED NASAL 3 TIMES DAILY
Qty: 15 ML | Refills: 3 | Status: SHIPPED | OUTPATIENT
Start: 2023-12-13

## 2023-12-13 RX ORDER — BLOOD-GLUCOSE METER
1 EACH MISCELLANEOUS AS NEEDED
Qty: 1 EACH | Refills: 1 | Status: SHIPPED | OUTPATIENT
Start: 2023-12-13

## 2023-12-13 RX ORDER — GLUCOSAM/CHON-MSM1/C/MANG/BOSW 500-416.6
1 TABLET ORAL 4 TIMES DAILY PRN
Qty: 100 EACH | Refills: 5 | Status: SHIPPED | OUTPATIENT
Start: 2023-12-13

## 2023-12-13 ASSESSMENT — ENCOUNTER SYMPTOMS
CHEST TIGHTNESS: 0
SHORTNESS OF BREATH: 0

## 2023-12-13 NOTE — TELEPHONE ENCOUNTER
Patient is supposed to be scheduled for a Watchman's procedure 4 weeks ago via [facility] Dr. Servin.  He has not heard anything in 4 weeks- please call office and find out what is going on so we can advise patient.

## 2023-12-13 NOTE — PROGRESS NOTES
daily      tamsulosin (FLOMAX) 0.4 MG capsule Take 1 capsule by mouth daily. 30 capsule 3    allopurinol (ZYLOPRIM) 300 MG tablet Take 1 tablet by mouth daily      isosorbide mononitrate (IMDUR) 60 MG CR tablet Take 1 tablet by mouth daily      furosemide (LASIX) 40 MG tablet Take 1 tablet by mouth daily      Alcohol Swabs (B-D SINGLE USE SWABS REGULAR) PADS 1 Application by Does not apply route 4 times daily as needed (testing) 100 each 5    Blood Glucose Monitoring Suppl (TRUE METRIX AIR GLUCOSE METER) TONJA 1 m by Does not apply route as needed (UAD for four times daily testing) 1 each 1    TRUEplus Lancets 33G MISC 1 Dose by Does not apply route 4 times daily as needed (blood sugar check) 100 each 5    blood glucose test strips (RELION TRUE METRIX TEST STRIPS) strip 1 each by In Vitro route 4 times daily as needed (blood sugar checks) 100 each 5     No current facility-administered medications for this visit.         Patient Active Problem List   Diagnosis    Hypertension    CHF (congestive heart failure), NYHA class III (HCC)    Hematochezia    Atrial fibrillation (HCC)    Diabetes mellitus (HCC)    Supratherapeutic INR    Rectal bleeding    Cellulitis of left foot    Type 2 diabetes mellitus with diabetic polyneuropathy, with long-term current use of insulin (HCC)    Dyslipidemia (high LDL; low HDL)    History of fracture of left ankle    Coronary artery disease of native artery of native heart with stable angina pectoris (HCC)    Diabetic ulcer of right foot with fat layer exposed ([address])    Former smoker    Pacemaker    Acute kidney injury ([address])    History of prostate cancer    Failure of outpatient treatment    Benign hypertensive heart disease with heart failure (HCC)    Abscess of left foot    Elevated C-reactive protein (CRP)    Urge incontinence of urine       Past Medical History:   Diagnosis Date    Atrial fibrillation ([address])     Benign prostatic hyperplasia     CHF (congestive heart failure) ([address])

## 2023-12-20 ENCOUNTER — OFFICE VISIT (OUTPATIENT)
Dept: OPERATING ROOM | Age: 73
End: 2023-12-20
Attending: INTERNAL MEDICINE

## 2024-02-07 ENCOUNTER — HOSPITAL ENCOUNTER (OUTPATIENT)
Age: 74
Setting detail: OUTPATIENT SURGERY
Discharge: HOME | End: 2024-02-09
Attending: INTERNAL MEDICINE
Payer: MEDICARE

## 2024-02-07 ENCOUNTER — OFFICE VISIT (OUTPATIENT)
Dept: OPERATING ROOM | Age: 74
End: 2024-02-07
Attending: INTERNAL MEDICINE

## 2024-02-07 VITALS
HEART RATE: 60 BPM | DIASTOLIC BLOOD PRESSURE: 63 MMHG | HEIGHT: 63 IN | BODY MASS INDEX: 32.43 KG/M2 | RESPIRATION RATE: 15 BRPM | SYSTOLIC BLOOD PRESSURE: 101 MMHG | WEIGHT: 183 LBS | TEMPERATURE: 97.6 F | OXYGEN SATURATION: 92 %

## 2024-02-07 DIAGNOSIS — I48.91 ATRIAL FIBRILLATION, UNSPECIFIED TYPE (HCC): ICD-10-CM

## 2024-02-07 DIAGNOSIS — I48.91 ATRIAL FIBRILLATION (HCC): Primary | ICD-10-CM

## 2024-02-07 LAB
BUN BLD-MCNC: 22 MG/DL (ref 8–26)
CHLORIDE BLD-SCNC: 104 MMOL/L (ref 98–107)
ECHO BSA: 1.92 M2
ECHO BSA: 1.92 M2
EGFR, POC: >60 ML/MIN/1.73M2
GLUCOSE BLD-MCNC: 278 MG/DL (ref 74–100)
HCT VFR BLD AUTO: 59 % (ref 41–53)
POC CREATININE: 1.1 MG/DL (ref 0.51–1.19)
POC HEMOGLOBIN (CALC): 20.2 G/DL (ref 13.5–17.5)
POTASSIUM BLD-SCNC: 3.8 MMOL/L (ref 3.5–4.5)
SODIUM BLD-SCNC: 142 MMOL/L (ref 138–146)

## 2024-02-07 PROCEDURE — 85014 HEMATOCRIT: CPT

## 2024-02-07 PROCEDURE — 6360000002 HC RX W HCPCS: Performed by: STUDENT IN AN ORGANIZED HEALTH CARE EDUCATION/TRAINING PROGRAM

## 2024-02-07 PROCEDURE — 82435 ASSAY OF BLOOD CHLORIDE: CPT

## 2024-02-07 PROCEDURE — 7100000010 HC PHASE II RECOVERY - FIRST 15 MIN: Performed by: STUDENT IN AN ORGANIZED HEALTH CARE EDUCATION/TRAINING PROGRAM

## 2024-02-07 PROCEDURE — 82565 ASSAY OF CREATININE: CPT

## 2024-02-07 PROCEDURE — 84132 ASSAY OF SERUM POTASSIUM: CPT

## 2024-02-07 PROCEDURE — 84295 ASSAY OF SERUM SODIUM: CPT

## 2024-02-07 PROCEDURE — 2580000003 HC RX 258: Performed by: INTERNAL MEDICINE

## 2024-02-07 PROCEDURE — 93325 DOPPLER ECHO COLOR FLOW MAPG: CPT

## 2024-02-07 PROCEDURE — 93312 ECHO TRANSESOPHAGEAL: CPT

## 2024-02-07 PROCEDURE — 6370000000 HC RX 637 (ALT 250 FOR IP): Performed by: STUDENT IN AN ORGANIZED HEALTH CARE EDUCATION/TRAINING PROGRAM

## 2024-02-07 PROCEDURE — 6360000002 HC RX W HCPCS: Performed by: INTERNAL MEDICINE

## 2024-02-07 PROCEDURE — 82947 ASSAY GLUCOSE BLOOD QUANT: CPT

## 2024-02-07 PROCEDURE — 99152 MOD SED SAME PHYS/QHP 5/>YRS: CPT | Performed by: STUDENT IN AN ORGANIZED HEALTH CARE EDUCATION/TRAINING PROGRAM

## 2024-02-07 PROCEDURE — 84520 ASSAY OF UREA NITROGEN: CPT

## 2024-02-07 RX ORDER — LIDOCAINE HYDROCHLORIDE 20 MG/ML
SOLUTION OROPHARYNGEAL PRN
Status: DISCONTINUED | OUTPATIENT
Start: 2024-02-07 | End: 2024-02-10 | Stop reason: HOSPADM

## 2024-02-07 RX ORDER — MIDAZOLAM HYDROCHLORIDE 1 MG/ML
INJECTION INTRAMUSCULAR; INTRAVENOUS PRN
Status: DISCONTINUED | OUTPATIENT
Start: 2024-02-07 | End: 2024-02-10 | Stop reason: HOSPADM

## 2024-02-07 RX ORDER — SODIUM CHLORIDE 9 MG/ML
INJECTION, SOLUTION INTRAVENOUS CONTINUOUS
Status: DISCONTINUED | OUTPATIENT
Start: 2024-02-07 | End: 2024-02-10 | Stop reason: HOSPADM

## 2024-02-07 RX ORDER — SODIUM CHLORIDE 0.9 % (FLUSH) 0.9 %
5-40 SYRINGE (ML) INJECTION EVERY 12 HOURS SCHEDULED
Status: DISCONTINUED | OUTPATIENT
Start: 2024-02-07 | End: 2024-02-10 | Stop reason: HOSPADM

## 2024-02-07 RX ORDER — SODIUM CHLORIDE 0.9 % (FLUSH) 0.9 %
5-40 SYRINGE (ML) INJECTION PRN
Status: DISCONTINUED | OUTPATIENT
Start: 2024-02-07 | End: 2024-02-10 | Stop reason: HOSPADM

## 2024-02-07 RX ORDER — FENTANYL CITRATE 50 UG/ML
INJECTION, SOLUTION INTRAMUSCULAR; INTRAVENOUS PRN
Status: DISCONTINUED | OUTPATIENT
Start: 2024-02-07 | End: 2024-02-10 | Stop reason: HOSPADM

## 2024-02-07 RX ORDER — SODIUM CHLORIDE 9 MG/ML
INJECTION, SOLUTION INTRAVENOUS PRN
Status: DISCONTINUED | OUTPATIENT
Start: 2024-02-07 | End: 2024-02-10 | Stop reason: HOSPADM

## 2024-02-07 RX ADMIN — BENZOCAINE, BUTAMBEN, AND TETRACAINE HYDROCHLORIDE 3 SPRAY: .028; .004; .004 AEROSOL, SPRAY TOPICAL at 13:06

## 2024-02-07 RX ADMIN — MIDAZOLAM 1 MG: 1 INJECTION INTRAMUSCULAR; INTRAVENOUS at 13:12

## 2024-02-07 RX ADMIN — FENTANYL CITRATE 50 MCG: 50 INJECTION, SOLUTION INTRAMUSCULAR; INTRAVENOUS at 13:08

## 2024-02-07 RX ADMIN — LIDOCAINE HYDROCHLORIDE 5 ML: 20 SOLUTION ORAL; TOPICAL at 13:06

## 2024-02-07 RX ADMIN — MIDAZOLAM 2 MG: 1 INJECTION INTRAMUSCULAR; INTRAVENOUS at 13:07

## 2024-02-07 RX ADMIN — SODIUM CHLORIDE: 9 INJECTION, SOLUTION INTRAVENOUS at 10:12

## 2024-02-07 NOTE — PROGRESS NOTES
Received post procedure to PCC to room 3. Assessment obtained. Restrictions reviewed with patient. Post procedure pathway initiated.  Family at side.  Patient without complaints.

## 2024-02-07 NOTE — PROGRESS NOTES
Patient admitted, consent signed and questions answered. Patient ready for procedure. Call light to reach with side rails up 2 of 2. Family at bedside with patient.  History and physical completed..

## 2024-02-07 NOTE — PROGRESS NOTES
Ambulated in saeed with writer and to bathroom. Hipolito well. Discharged per w/c with wife and belongings.Gag reflex present.

## 2024-02-07 NOTE — DISCHARGE INSTRUCTIONS
SEDATION / ANALGESIA INFORMATION / HOME GOING ADVICE  You have received the sedation/analgesia medication during your visit    Sedation/analgesia is used during short medical procedures under controlled supervision. The medication will produce a strong relaxation. You will be able to hear, speak and follow instructions, but your memory and alertness will be decreased.    You will be able to swallow and breathe on your own. During sedation/analgesia your blood pressure, heart and breathing will be watched closely. After the procedure, you may not remember what was said or done.    You may have the following effects from the medication.  \" Drowsiness, dizziness, sleepiness or confusion.  \" Difficulty remembering or delayed reaction times.  \" Loss of fine muscle control or difficulty with your balance especially while walking.  \" Difficulty focusing or blurred vision.  You may not be aware of slight changes in your behavior and/or your reaction time because of the medication used during the procedure. Therefore you should follow these instructions.  \" Have someone responsible help you with your care.  \" Do not drive for 24 hours.  \" Do not operate equipment for 24 hours (lawnmowers, power tools, kitchen accessories, stove).  \" Do not drink any alcoholic beverages for a minimum of 24 hours.  \" Do not make important personal, legal or business decisions for 24 hours.  \" You may experience dizziness or lightheadedness. Move slowly and carefully, do not make sudden position changes.  \" Drink extra amounts of fluids today.  \" Increase your diet as tolerated (unless you have received specific instructions from your doctor).  \" If you feel nauseated, continue with liquids until the nausea is gone.  \" Notify your physician if you have not urinated within 8 hours after the procedure.  \" Resume your medications unless otherwise instructed.    TRANSESOPHAGEAL ECHOCARDIOGRAM / MONIQUE DISCHARGE INSTRUCTIONS    If the following occurs

## 2024-02-07 NOTE — H&P
[facility]  Transesophageal Echocardiogram       Today's Date:  2/7/2024  Ordering Physician:  Dr. Servin  Indication:   Pre Watchman procedure    Operators:  Primary:   Dr. Stevie Medina Attending Physician)  Assistant:   Alexsandra Sparks MD (Cardiovascular Fellow)    Pre Procedure Conscious Sedation Data:  ASA Class:    [] I [x] II [] III [] IV    Mallampati Class:  [] I [x] II [] III [] IV    Procedure:  Patient seen and examined. History and Physical reviewed. Labs reviewed.    After informed consent was obtained with explanation of the risks and benefits, the patient was brought to cardiac cath lab. All sedation was administered by the cardiologist. The oropharynx was pre-anesthesized with viscous lidocaine and cetacaine spray. The ultrasound probe was passed without any difficulty.    MONIQUE findings:  LA:  Normal  JULIO:  No thrombus, smoke  RA:  Normal, pacer wire noted   RV:   Normal  LV:  Normal  Estimated LVEF:  55%  Aorta:   Mild atheromatous disease arch  Pericardium: No pericardial effusion  Septum:  No intracardiac shunt via color Doppler.        Valves:  Mitral Valve: Structurally normal. Mild regurgitation is identified.  Aortic Valve: The aortic valve is trileaflet and opens adequately. No regurgitiation is identified.  Tricuspid valve: Structurally normal. No regurgitation is identified.  Pulmonary valve: Normal. No significant regurgitation    No valvular vegetations or thrombus identified.    Summary:   1. A MONIQUE was performed without complications.  2. LVEF   3. No thrombus or valvular vegetation identified  4. JULIO measurements:                                   0 degree:        1.99     -     2.59                                  45 degrees:     1.44     -     2.34                                   90                   1.64     -    2.35                                   135 degrees   1.93     -    2.35        Alexsandra Sparks, PGY- 4  Fellow, cardiovascular disease   [facility], 
detail, with patient.     Briefly, the potential risks include, but are not limited to, bleeding, damage to teeth or pharynx, esophageal damage or rupture, chamber perforation, tamponade, respiratory failure requiring intubation, need for emergent surgery, and even death. All questions and concerns were answered. Patient agreed to proceed with the procedure understanding the above risks and alternatives to the procedure.     Risks, benefits, alternatives, and details discussed extensively. Accepts and consents.    Pre Procedure Conscious Sedation Data:  ASA Class:    [] I [x] II [] III [] IV    Mallampati Class:  [] I [x] II [] III [] IV      Alexsandra Sparks MD  Fellow, Cardiovascular Diseases  [facility]  02/07/24 8:33 AM

## 2024-02-08 RX ORDER — ISOSORBIDE MONONITRATE 60 MG/1
60 TABLET, EXTENDED RELEASE ORAL EVERY MORNING
Qty: 90 TABLET | Refills: 3 | Status: SHIPPED | OUTPATIENT
Start: 2024-02-08

## 2024-02-08 NOTE — TELEPHONE ENCOUNTER
Rufino Armenta is calling to request a refill on the following medication(s):    Medication Request:  Requested Prescriptions     Pending Prescriptions Disp Refills    isosorbide mononitrate (IMDUR) 60 MG extended release tablet [Pharmacy Med Name: ISOSORBIDE MONONITRATE ER 60 MG Tablet Extended Release 24 Hour] 90 tablet 3     Sig: TAKE 1 TABLET EVERY MORNING       Last Visit Date (If Applicable):  12/13/2023    Next Visit Date:    3/15/2024

## 2024-02-13 ENCOUNTER — HOSPITAL ENCOUNTER (INPATIENT)
Age: 74
LOS: 1 days | Discharge: HOME | DRG: 274 | End: 2024-02-14
Attending: INTERNAL MEDICINE | Admitting: INTERNAL MEDICINE
Payer: MEDICARE

## 2024-02-13 ENCOUNTER — ANESTHESIA (OUTPATIENT)
Dept: OPERATING ROOM | Age: 74
DRG: 274 | End: 2024-02-13
Payer: MEDICARE

## 2024-02-13 ENCOUNTER — APPOINTMENT (OUTPATIENT)
Age: 74
DRG: 274 | End: 2024-02-13
Attending: INTERNAL MEDICINE
Payer: MEDICARE

## 2024-02-13 ENCOUNTER — ANESTHESIA EVENT (OUTPATIENT)
Dept: OPERATING ROOM | Age: 74
DRG: 274 | End: 2024-02-13
Payer: MEDICARE

## 2024-02-13 DIAGNOSIS — I48.91 ATRIAL FIBRILLATION, UNSPECIFIED TYPE (HCC): ICD-10-CM

## 2024-02-13 DIAGNOSIS — Z95.818 PRESENCE OF WATCHMAN LEFT ATRIAL APPENDAGE CLOSURE DEVICE: Primary | ICD-10-CM

## 2024-02-13 PROBLEM — I48.0 PAF (PAROXYSMAL ATRIAL FIBRILLATION) (HCC): Status: ACTIVE | Noted: 2024-02-13

## 2024-02-13 LAB
ABO + RH BLD: NORMAL
ACT BLD: 170 SEC (ref 79–149)
ACT BLD: 302 SEC (ref 79–149)
ARM BAND NUMBER: NORMAL
BLOOD BANK SAMPLE EXPIRATION: NORMAL
BLOOD GROUP ANTIBODIES SERPL: NEGATIVE
BUN BLD-MCNC: 20 MG/DL (ref 8–26)
CHLORIDE BLD-SCNC: 106 MMOL/L (ref 98–107)
ECHO BSA: 1.95 M2
EGFR, POC: >60 ML/MIN/1.73M2
EKG ATRIAL RATE: 72 BPM
EKG Q-T INTERVAL: 486 MS
EKG QRS DURATION: 174 MS
EKG QTC CALCULATION (BAZETT): 542 MS
EKG R AXIS: 79 DEGREES
EKG T AXIS: -56 DEGREES
EKG VENTRICULAR RATE: 75 BPM
GLUCOSE BLD-MCNC: 200 MG/DL (ref 75–110)
GLUCOSE BLD-MCNC: 216 MG/DL (ref 74–100)
GLUCOSE BLD-MCNC: 358 MG/DL (ref 75–110)
HCT VFR BLD AUTO: 54 % (ref 41–53)
POC CREATININE: 0.8 MG/DL (ref 0.51–1.19)
POC HEMOGLOBIN (CALC): 18.3 G/DL (ref 13.5–17.5)
POTASSIUM BLD-SCNC: 4.3 MMOL/L (ref 3.5–4.5)
SODIUM BLD-SCNC: 142 MMOL/L (ref 138–146)

## 2024-02-13 PROCEDURE — 86900 BLOOD TYPING SEROLOGIC ABO: CPT

## 2024-02-13 PROCEDURE — 2060000000 HC ICU INTERMEDIATE R&B

## 2024-02-13 PROCEDURE — 6360000004 HC RX CONTRAST MEDICATION: Performed by: INTERNAL MEDICINE

## 2024-02-13 PROCEDURE — C1760 CLOSURE DEV, VASC: HCPCS | Performed by: INTERNAL MEDICINE

## 2024-02-13 PROCEDURE — C1892 INTRO/SHEATH,FIXED,PEEL-AWAY: HCPCS | Performed by: INTERNAL MEDICINE

## 2024-02-13 PROCEDURE — 93312 ECHO TRANSESOPHAGEAL: CPT

## 2024-02-13 PROCEDURE — 82947 ASSAY GLUCOSE BLOOD QUANT: CPT

## 2024-02-13 PROCEDURE — 82565 ASSAY OF CREATININE: CPT

## 2024-02-13 PROCEDURE — 93010 ELECTROCARDIOGRAM REPORT: CPT | Performed by: INTERNAL MEDICINE

## 2024-02-13 PROCEDURE — 82435 ASSAY OF BLOOD CHLORIDE: CPT

## 2024-02-13 PROCEDURE — 85347 COAGULATION TIME ACTIVATED: CPT

## 2024-02-13 PROCEDURE — 2580000003 HC RX 258: Performed by: INTERNAL MEDICINE

## 2024-02-13 PROCEDURE — 93312 ECHO TRANSESOPHAGEAL: CPT | Performed by: INTERNAL MEDICINE

## 2024-02-13 PROCEDURE — 3600000017 HC SURGERY HYBRID ADDL 15MIN: Performed by: INTERNAL MEDICINE

## 2024-02-13 PROCEDURE — 6360000002 HC RX W HCPCS

## 2024-02-13 PROCEDURE — 3700000001 HC ADD 15 MINUTES (ANESTHESIA): Performed by: INTERNAL MEDICINE

## 2024-02-13 PROCEDURE — C1894 INTRO/SHEATH, NON-LASER: HCPCS | Performed by: INTERNAL MEDICINE

## 2024-02-13 PROCEDURE — 7100000001 HC PACU RECOVERY - ADDTL 15 MIN: Performed by: INTERNAL MEDICINE

## 2024-02-13 PROCEDURE — 84520 ASSAY OF UREA NITROGEN: CPT

## 2024-02-13 PROCEDURE — 2500000003 HC RX 250 WO HCPCS

## 2024-02-13 PROCEDURE — 7100000000 HC PACU RECOVERY - FIRST 15 MIN: Performed by: INTERNAL MEDICINE

## 2024-02-13 PROCEDURE — 7100000010 HC PHASE II RECOVERY - FIRST 15 MIN

## 2024-02-13 PROCEDURE — 7100000010 HC PHASE II RECOVERY - FIRST 15 MIN: Performed by: INTERNAL MEDICINE

## 2024-02-13 PROCEDURE — C1889 IMPLANT/INSERT DEVICE, NOC: HCPCS | Performed by: INTERNAL MEDICINE

## 2024-02-13 PROCEDURE — 84132 ASSAY OF SERUM POTASSIUM: CPT

## 2024-02-13 PROCEDURE — B2151ZZ FLUOROSCOPY OF LEFT HEART USING LOW OSMOLAR CONTRAST: ICD-10-PCS | Performed by: INTERNAL MEDICINE

## 2024-02-13 PROCEDURE — 86850 RBC ANTIBODY SCREEN: CPT

## 2024-02-13 PROCEDURE — 84295 ASSAY OF SERUM SODIUM: CPT

## 2024-02-13 PROCEDURE — 33340 PERQ CLSR TCAT L ATR APNDGE: CPT | Performed by: INTERNAL MEDICINE

## 2024-02-13 PROCEDURE — C1769 GUIDE WIRE: HCPCS | Performed by: INTERNAL MEDICINE

## 2024-02-13 PROCEDURE — 2500000003 HC RX 250 WO HCPCS: Performed by: INTERNAL MEDICINE

## 2024-02-13 PROCEDURE — 7100000011 HC PHASE II RECOVERY - ADDTL 15 MIN

## 2024-02-13 PROCEDURE — 3600000007 HC SURGERY HYBRID BASE: Performed by: INTERNAL MEDICINE

## 2024-02-13 PROCEDURE — 2700000000 HC OXYGEN THERAPY PER DAY

## 2024-02-13 PROCEDURE — 2709999900 HC NON-CHARGEABLE SUPPLY: Performed by: INTERNAL MEDICINE

## 2024-02-13 PROCEDURE — 93005 ELECTROCARDIOGRAM TRACING: CPT | Performed by: INTERNAL MEDICINE

## 2024-02-13 PROCEDURE — 6370000000 HC RX 637 (ALT 250 FOR IP): Performed by: INTERNAL MEDICINE

## 2024-02-13 PROCEDURE — 85014 HEMATOCRIT: CPT

## 2024-02-13 PROCEDURE — 3700000000 HC ANESTHESIA ATTENDED CARE: Performed by: INTERNAL MEDICINE

## 2024-02-13 PROCEDURE — B246ZZ4 ULTRASONOGRAPHY OF RIGHT AND LEFT HEART, TRANSESOPHAGEAL: ICD-10-PCS | Performed by: INTERNAL MEDICINE

## 2024-02-13 PROCEDURE — 02L73DK OCCLUSION OF LEFT ATRIAL APPENDAGE WITH INTRALUMINAL DEVICE, PERCUTANEOUS APPROACH: ICD-10-PCS | Performed by: INTERNAL MEDICINE

## 2024-02-13 PROCEDURE — 94761 N-INVAS EAR/PLS OXIMETRY MLT: CPT

## 2024-02-13 PROCEDURE — 6370000000 HC RX 637 (ALT 250 FOR IP): Performed by: STUDENT IN AN ORGANIZED HEALTH CARE EDUCATION/TRAINING PROGRAM

## 2024-02-13 PROCEDURE — 2580000003 HC RX 258

## 2024-02-13 PROCEDURE — 93325 DOPPLER ECHO COLOR FLOW MAPG: CPT | Performed by: INTERNAL MEDICINE

## 2024-02-13 PROCEDURE — 86901 BLOOD TYPING SEROLOGIC RH(D): CPT

## 2024-02-13 DEVICE — LEFT ATRIAL APPENDAGE CLOSURE DEVICE WITH DELIVERY SYSTEM
Type: IMPLANTABLE DEVICE | Status: FUNCTIONAL
Brand: WATCHMAN FLX™

## 2024-02-13 RX ORDER — ONDANSETRON 2 MG/ML
INJECTION INTRAMUSCULAR; INTRAVENOUS PRN
Status: DISCONTINUED | OUTPATIENT
Start: 2024-02-13 | End: 2024-02-13 | Stop reason: SDUPTHER

## 2024-02-13 RX ORDER — ISOSORBIDE MONONITRATE 60 MG/1
60 TABLET, EXTENDED RELEASE ORAL EVERY MORNING
Status: DISCONTINUED | OUTPATIENT
Start: 2024-02-14 | End: 2024-02-14 | Stop reason: HOSPADM

## 2024-02-13 RX ORDER — HEPARIN SODIUM 1000 [USP'U]/ML
INJECTION, SOLUTION INTRAVENOUS; SUBCUTANEOUS PRN
Status: DISCONTINUED | OUTPATIENT
Start: 2024-02-13 | End: 2024-02-13 | Stop reason: SDUPTHER

## 2024-02-13 RX ORDER — ASPIRIN 81 MG/1
81 TABLET ORAL DAILY
Status: DISCONTINUED | OUTPATIENT
Start: 2024-02-14 | End: 2024-02-14 | Stop reason: HOSPADM

## 2024-02-13 RX ORDER — LIDOCAINE HYDROCHLORIDE 10 MG/ML
INJECTION, SOLUTION INFILTRATION; PERINEURAL
Status: COMPLETED
Start: 2024-02-13 | End: 2024-02-13

## 2024-02-13 RX ORDER — ACETAMINOPHEN 325 MG/1
650 TABLET ORAL EVERY 4 HOURS PRN
Status: DISCONTINUED | OUTPATIENT
Start: 2024-02-13 | End: 2024-02-14 | Stop reason: HOSPADM

## 2024-02-13 RX ORDER — SODIUM CHLORIDE, SODIUM LACTATE, POTASSIUM CHLORIDE, CALCIUM CHLORIDE 600; 310; 30; 20 MG/100ML; MG/100ML; MG/100ML; MG/100ML
INJECTION, SOLUTION INTRAVENOUS CONTINUOUS PRN
Status: DISCONTINUED | OUTPATIENT
Start: 2024-02-13 | End: 2024-02-13 | Stop reason: SDUPTHER

## 2024-02-13 RX ORDER — FUROSEMIDE 40 MG/1
40 TABLET ORAL DAILY
Status: DISCONTINUED | OUTPATIENT
Start: 2024-02-13 | End: 2024-02-14 | Stop reason: HOSPADM

## 2024-02-13 RX ORDER — INSULIN LISPRO 100 [IU]/ML
0-4 INJECTION, SOLUTION INTRAVENOUS; SUBCUTANEOUS NIGHTLY
Status: DISCONTINUED | OUTPATIENT
Start: 2024-02-13 | End: 2024-02-14 | Stop reason: HOSPADM

## 2024-02-13 RX ORDER — CLOPIDOGREL BISULFATE 75 MG/1
300 TABLET ORAL ONCE
Status: COMPLETED | OUTPATIENT
Start: 2024-02-13 | End: 2024-02-13

## 2024-02-13 RX ORDER — SODIUM CHLORIDE 0.9 % (FLUSH) 0.9 %
5-40 SYRINGE (ML) INJECTION EVERY 12 HOURS SCHEDULED
Status: DISCONTINUED | OUTPATIENT
Start: 2024-02-13 | End: 2024-02-14 | Stop reason: HOSPADM

## 2024-02-13 RX ORDER — INSULIN GLARGINE 100 [IU]/ML
10 INJECTION, SOLUTION SUBCUTANEOUS NIGHTLY
Status: DISCONTINUED | OUTPATIENT
Start: 2024-02-13 | End: 2024-02-14 | Stop reason: HOSPADM

## 2024-02-13 RX ORDER — METOPROLOL SUCCINATE 50 MG/1
50 TABLET, EXTENDED RELEASE ORAL DAILY
Status: DISCONTINUED | OUTPATIENT
Start: 2024-02-13 | End: 2024-02-14 | Stop reason: HOSPADM

## 2024-02-13 RX ORDER — LIDOCAINE HYDROCHLORIDE 10 MG/ML
INJECTION, SOLUTION INFILTRATION; PERINEURAL PRN
Status: DISCONTINUED | OUTPATIENT
Start: 2024-02-13 | End: 2024-02-13 | Stop reason: HOSPADM

## 2024-02-13 RX ORDER — DEXTROSE MONOHYDRATE 100 MG/ML
INJECTION, SOLUTION INTRAVENOUS CONTINUOUS PRN
Status: DISCONTINUED | OUTPATIENT
Start: 2024-02-13 | End: 2024-02-14 | Stop reason: HOSPADM

## 2024-02-13 RX ORDER — INSULIN LISPRO 100 [IU]/ML
0-8 INJECTION, SOLUTION INTRAVENOUS; SUBCUTANEOUS
Status: DISCONTINUED | OUTPATIENT
Start: 2024-02-13 | End: 2024-02-14 | Stop reason: HOSPADM

## 2024-02-13 RX ORDER — DEXAMETHASONE SODIUM PHOSPHATE 10 MG/ML
INJECTION, SOLUTION INTRAMUSCULAR; INTRAVENOUS PRN
Status: DISCONTINUED | OUTPATIENT
Start: 2024-02-13 | End: 2024-02-13 | Stop reason: SDUPTHER

## 2024-02-13 RX ORDER — PROTAMINE SULFATE 10 MG/ML
INJECTION, SOLUTION INTRAVENOUS PRN
Status: DISCONTINUED | OUTPATIENT
Start: 2024-02-13 | End: 2024-02-13 | Stop reason: SDUPTHER

## 2024-02-13 RX ORDER — PHENYLEPHRINE HCL IN 0.9% NACL 1 MG/10 ML
SYRINGE (ML) INTRAVENOUS PRN
Status: DISCONTINUED | OUTPATIENT
Start: 2024-02-13 | End: 2024-02-13 | Stop reason: SDUPTHER

## 2024-02-13 RX ORDER — LOSARTAN POTASSIUM 25 MG/1
25 TABLET ORAL DAILY
Status: DISCONTINUED | OUTPATIENT
Start: 2024-02-13 | End: 2024-02-14 | Stop reason: HOSPADM

## 2024-02-13 RX ORDER — GABAPENTIN 600 MG/1
600 TABLET ORAL 3 TIMES DAILY
Status: DISCONTINUED | OUTPATIENT
Start: 2024-02-13 | End: 2024-02-14 | Stop reason: HOSPADM

## 2024-02-13 RX ORDER — LIDOCAINE HYDROCHLORIDE 10 MG/ML
INJECTION, SOLUTION EPIDURAL; INFILTRATION; INTRACAUDAL; PERINEURAL PRN
Status: DISCONTINUED | OUTPATIENT
Start: 2024-02-13 | End: 2024-02-13 | Stop reason: SDUPTHER

## 2024-02-13 RX ORDER — SODIUM CHLORIDE 0.9 % (FLUSH) 0.9 %
5-40 SYRINGE (ML) INJECTION PRN
Status: DISCONTINUED | OUTPATIENT
Start: 2024-02-13 | End: 2024-02-14 | Stop reason: HOSPADM

## 2024-02-13 RX ORDER — SODIUM CHLORIDE 9 MG/ML
INJECTION, SOLUTION INTRAVENOUS CONTINUOUS
Status: DISCONTINUED | OUTPATIENT
Start: 2024-02-13 | End: 2024-02-14 | Stop reason: HOSPADM

## 2024-02-13 RX ORDER — PROPOFOL 10 MG/ML
INJECTION, EMULSION INTRAVENOUS PRN
Status: DISCONTINUED | OUTPATIENT
Start: 2024-02-13 | End: 2024-02-13 | Stop reason: SDUPTHER

## 2024-02-13 RX ORDER — ALLOPURINOL 300 MG/1
300 TABLET ORAL DAILY
Status: DISCONTINUED | OUTPATIENT
Start: 2024-02-13 | End: 2024-02-14 | Stop reason: HOSPADM

## 2024-02-13 RX ORDER — FENTANYL CITRATE 50 UG/ML
INJECTION, SOLUTION INTRAMUSCULAR; INTRAVENOUS PRN
Status: DISCONTINUED | OUTPATIENT
Start: 2024-02-13 | End: 2024-02-13 | Stop reason: SDUPTHER

## 2024-02-13 RX ORDER — SODIUM CHLORIDE 9 MG/ML
INJECTION, SOLUTION INTRAVENOUS PRN
Status: DISCONTINUED | OUTPATIENT
Start: 2024-02-13 | End: 2024-02-14 | Stop reason: HOSPADM

## 2024-02-13 RX ORDER — ONDANSETRON 2 MG/ML
4 INJECTION INTRAMUSCULAR; INTRAVENOUS EVERY 6 HOURS PRN
Status: DISCONTINUED | OUTPATIENT
Start: 2024-02-13 | End: 2024-02-14 | Stop reason: HOSPADM

## 2024-02-13 RX ORDER — TRAMADOL HYDROCHLORIDE 50 MG/1
50 TABLET ORAL 3 TIMES DAILY
Status: DISCONTINUED | OUTPATIENT
Start: 2024-02-13 | End: 2024-02-14 | Stop reason: HOSPADM

## 2024-02-13 RX ORDER — ATORVASTATIN CALCIUM 40 MG/1
40 TABLET, FILM COATED ORAL NIGHTLY
Status: DISCONTINUED | OUTPATIENT
Start: 2024-02-13 | End: 2024-02-14 | Stop reason: HOSPADM

## 2024-02-13 RX ORDER — CLOPIDOGREL BISULFATE 75 MG/1
75 TABLET ORAL DAILY
Status: DISCONTINUED | OUTPATIENT
Start: 2024-02-14 | End: 2024-02-14 | Stop reason: HOSPADM

## 2024-02-13 RX ORDER — ROCURONIUM BROMIDE 10 MG/ML
INJECTION, SOLUTION INTRAVENOUS PRN
Status: DISCONTINUED | OUTPATIENT
Start: 2024-02-13 | End: 2024-02-13 | Stop reason: SDUPTHER

## 2024-02-13 RX ORDER — ASPIRIN 325 MG
325 TABLET ORAL ONCE
Status: COMPLETED | OUTPATIENT
Start: 2024-02-13 | End: 2024-02-13

## 2024-02-13 RX ADMIN — FENTANYL CITRATE 50 MCG: 50 INJECTION, SOLUTION INTRAMUSCULAR; INTRAVENOUS at 11:26

## 2024-02-13 RX ADMIN — Medication 100 MCG: at 12:03

## 2024-02-13 RX ADMIN — SODIUM CHLORIDE, PRESERVATIVE FREE 10 ML: 5 INJECTION INTRAVENOUS at 21:50

## 2024-02-13 RX ADMIN — SODIUM CHLORIDE: 9 INJECTION, SOLUTION INTRAVENOUS at 09:11

## 2024-02-13 RX ADMIN — Medication 200 MCG: at 11:36

## 2024-02-13 RX ADMIN — INSULIN GLARGINE 10 UNITS: 100 INJECTION, SOLUTION SUBCUTANEOUS at 21:49

## 2024-02-13 RX ADMIN — DEXAMETHASONE SODIUM PHOSPHATE 10 MG: 10 INJECTION INTRAMUSCULAR; INTRAVENOUS at 11:48

## 2024-02-13 RX ADMIN — ROCURONIUM BROMIDE 10 MG: 10 INJECTION, SOLUTION INTRAVENOUS at 12:17

## 2024-02-13 RX ADMIN — ASPIRIN 325 MG: 325 TABLET ORAL at 13:27

## 2024-02-13 RX ADMIN — PROTAMINE SULFATE 30 MG: 10 INJECTION, SOLUTION INTRAVENOUS at 12:19

## 2024-02-13 RX ADMIN — FENTANYL CITRATE 50 MCG: 50 INJECTION, SOLUTION INTRAMUSCULAR; INTRAVENOUS at 12:35

## 2024-02-13 RX ADMIN — LIDOCAINE HYDROCHLORIDE 50 MG: 10 INJECTION, SOLUTION EPIDURAL; INFILTRATION; INTRACAUDAL; PERINEURAL at 11:26

## 2024-02-13 RX ADMIN — PROPOFOL 100 MG: 10 INJECTION, EMULSION INTRAVENOUS at 11:25

## 2024-02-13 RX ADMIN — ONDANSETRON 4 MG: 2 INJECTION INTRAMUSCULAR; INTRAVENOUS at 12:20

## 2024-02-13 RX ADMIN — SODIUM CHLORIDE: 9 INJECTION, SOLUTION INTRAVENOUS at 13:27

## 2024-02-13 RX ADMIN — INSULIN LISPRO 4 UNITS: 100 INJECTION, SOLUTION INTRAVENOUS; SUBCUTANEOUS at 21:01

## 2024-02-13 RX ADMIN — SUGAMMADEX 200 MG: 100 INJECTION, SOLUTION INTRAVENOUS at 12:26

## 2024-02-13 RX ADMIN — Medication 200 MCG: at 11:41

## 2024-02-13 RX ADMIN — SODIUM CHLORIDE, POTASSIUM CHLORIDE, SODIUM LACTATE AND CALCIUM CHLORIDE: 600; 310; 30; 20 INJECTION, SOLUTION INTRAVENOUS at 11:23

## 2024-02-13 RX ADMIN — PROPOFOL 50 MG: 10 INJECTION, EMULSION INTRAVENOUS at 11:26

## 2024-02-13 RX ADMIN — ACETAMINOPHEN 650 MG: 325 TABLET ORAL at 14:24

## 2024-02-13 RX ADMIN — Medication 2 G: at 11:37

## 2024-02-13 RX ADMIN — CLOPIDOGREL BISULFATE 300 MG: 300 TABLET, FILM COATED ORAL at 13:26

## 2024-02-13 RX ADMIN — Medication 200 MCG: at 11:34

## 2024-02-13 RX ADMIN — Medication 200 MCG: at 11:49

## 2024-02-13 RX ADMIN — HEPARIN SODIUM 12000 UNITS: 1000 INJECTION, SOLUTION INTRAVENOUS; SUBCUTANEOUS at 11:47

## 2024-02-13 RX ADMIN — ROCURONIUM BROMIDE 50 MG: 10 INJECTION, SOLUTION INTRAVENOUS at 11:26

## 2024-02-13 RX ADMIN — ACETAMINOPHEN 650 MG: 325 TABLET ORAL at 20:36

## 2024-02-13 RX ADMIN — SODIUM CHLORIDE: 9 INJECTION, SOLUTION INTRAVENOUS at 18:16

## 2024-02-13 RX ADMIN — PHENYLEPHRINE HYDROCHLORIDE 30 MCG/MIN: 10 INJECTION INTRAVENOUS at 11:45

## 2024-02-13 NOTE — PROGRESS NOTES
Patient noted to have small scrap to right cheek.  Patient states he had it prior to his procedure.

## 2024-02-13 NOTE — PROCEDURES
[facility]  3D Interventional /Structural MONIQUE   Op Note for MONIQUE guidance during Watchman Deployment       02/13/24   Rufino Armenta  [id number]  1950    Primary/Ordering Cardiologist: Dr. EVA Servin  Indication: MONIQUE guidance during Watchman    Operators:  Primary: Stevie Medina DO    Patient seen and examined. History and Physical reviewed. Labs reviewed.    After informed consent was obtained with explanation of the risks and benefits, the patient was brought to [facility] Cath lab. All sedation was administered via the Anesthesia department. Intubation done with single effort.     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   Significant Valve Diseases: Trivial MR/TR.     JULIO Morphology-  [facility]     JULIO Sizes:  0 degree- width (mm)- 21.6 - length- 19.9  45 degree- width (mm)- 16.2 - length- 242  90 degree- width (mm)- 19.1 - length- 154.  135 degree- width (mm)- 19.2 - length- 18.8    Size 27 Device Used Based on Measurements and Flouro.     Post Deployment Watchman Compression Size :  0 degree- size (mm): 20.8 - Compression: 23%  45 degree- size (mm): 21 - Compression: 22%  90 degree- size (mm): 19.9 - Compression: 26%  135 degree- size (mm): 21.4 - Compression: 21%    Post Deployment Leak Check:  0 degree- None, (no shoulder)  45 degree- None, (no shoulder)  90 degree-  None, (no shoulder)  135 degree- None, (<1/4 shoulder)    Post Deployment Tug Test: Passed     Post Deployment Effusion Check: No effusion    Post Deployment Septum Check: Iatrogenic Small ASD noted with L- R shunt    Watchman well seated and in good position confirmed on multiple views/angles.      Conclusion:   1. Successful Interventional / Structural MONIQUE for Watchman Deployment  2. See above for full details.   3. No effusion.    All of 
measurements of left atrial appendage, including ostium size and depth were obtained for selection of appropriate watchman device in different positions (0,45,90,135)       MONIQUE Pre findings:     Structures:  Normal LV function with EF 55%  Normal wall thickness  No significant pericardial effusion  Left Atrium/JULIO- No thrombus, Spontaneous Echo contrast  IntraAtrial Septum- Aneurysmal with bowing from L > R.   Significant Valve Diseases: Trivial MR/TR.      JULIO Morphology-  [place]      JULIO Sizes:  0 degree- width (mm)- 21.6 - length- 19.9  45 degree- width (mm)- 16.2 - length- 242  90 degree- width (mm)- 19.1 - length- 154.  135 degree- width (mm)- 19.2 - length- 18.8       Decision was made to use a size 27 mm Watchman device based on MONIQUE measurement and Watchman guidance protocol:        Both groins were prepped in a sterile fashion.     We gained access in the  [x] Right  [] Left femoral veins. Using Fluoroscopy and U/S.       Right femoral access was obtained using modified using modified seldinger technique. 16 F sheath was placed Patient received a bolus of heparin prior transseptal puncture. Transseptal punctures through intact septum were done using MONIQUE guidance as well as pressure monitoring using Bryce trans-septal system     MONIQUE and Fluoroscopy  were used to confirm in Tajik and DANIELS projections. We placed  Bryce  sheath dilator  inside the left atrium. A long wire was placed into the left superior pulmonary vein. Then the watchman access sheath was advanced and carefully paced at the ostium  of PV and positioned into left atrium . Then a pigtail catheter was inserted into the access sheath and was placed inside the left atrial appendage. Angiography of JULIO was performed. Pigtail catheter was removed. Then Watchman delivery sheath was inserted into the access sheath. Using fluoroscopy and live MONIQUE the anterior lobe of the appendage was located and delivery sheath was advanced into this lobe. Then

## 2024-02-13 NOTE — ANESTHESIA PRE PROCEDURE
Department of Anesthesiology  Preprocedure Note       Name:  Rufino Armenta   Age:  73 y.o.  :  1950                                          MRN:  [id number]         Date:  2024      Surgeon: Surgeon(s):  Reba Servin MD    Procedure: Procedure(s):  cristian / Watchman rajesh closure device    Medications prior to admission:   Prior to Admission medications    Medication Sig Start Date End Date Taking? Authorizing Provider   isosorbide mononitrate (IMDUR) 60 MG extended release tablet TAKE 1 TABLET EVERY MORNING 24   Jesús Suárez MD   apixaban (ELIQUIS) 5 MG TABS tablet Take 1 tablet by mouth 2 times daily 23   Makenna Banks MD   Alcohol Swabs (B-D SINGLE USE SWABS REGULAR) PADS 1 Application by Does not apply route 4 times daily as needed (testing) 23   Jesús Suárez MD   Blood Glucose Monitoring Suppl (TRUE METRIX AIR GLUCOSE METER) TONJA 1 m by Does not apply route as needed (UAD for four times daily testing) 23   Jesús Suárez MD   TRUEplus Lancets 33G MISC 1 Dose by Does not apply route 4 times daily as needed (blood sugar check) 23   Jesús Suárez MD   blood glucose test strips (RELION TRUE METRIX TEST STRIPS) strip 1 each by In Vitro route 4 times daily as needed (blood sugar checks) 23   Jesús Suárez MD   insulin 70-30 (NOVOLIN 70/30) (70-30) 100 UNIT per ML injection vial Inject 25 Units into the skin 2 times daily (before meals) 23   Jesús Suárez MD   tiZANidine (ZANAFLEX) 2 MG tablet Take 1 tablet by mouth 3 times daily as needed (back pain) 23   Jesús Suáerz MD   ipratropium (ATROVENT) 0.06 % nasal spray 2 sprays by Each Nostril route 3 times daily 23   Jesús Suárez MD   Accu-Chek Softclix Lancets MISC TEST BLOOD SUGAR TWICE DAILY AS DIRECTED FOR DIABETES 23   Jesús Suárez MD   atorvastatin (LIPITOR) 40 MG tablet TAKE 1 TABLET AT BEDTIME 23   Jesús Suárez MD   gabapentin

## 2024-02-13 NOTE — PROGRESS NOTES
Patient admitted, consent signed and questions answered. Patient ready for procedure. Call light to reach with side rails up 2 of 2. Bilateral groin areas clipped with writer and Amy RN present.  Family at bedside with patient.  History and physical complete.

## 2024-02-13 NOTE — PROGRESS NOTES
Report called to Ciro CHAN.  Patient transported to room 533 via stretcher.  Right groin assessed by Neida CHAN and writer.  Groijn soft, no bleeding or hematoma noted.  Pedal pulses palapab le and per doppler.

## 2024-02-13 NOTE — ANESTHESIA POSTPROCEDURE EVALUATION
Department of Anesthesiology  Postprocedure Note    Patient: Rufino Armenta  MRN: [id number]  YOB: 1950  Date of evaluation: 2/13/2024    Procedure Summary     Date: 02/13/24 Room / Location: [place] [address] / [facility]    Anesthesia Start: 1123 Anesthesia Stop: 1242    Procedure: taleb / Watchman rajesh closure device Diagnosis:       Atrial fibrillation, unspecified type (HCC)      (Atrial fibrillation, unspecified type (HCC) [I48.91])    Surgeons: Reba Servin MD Responsible Provider: Teddy Levine MD    Anesthesia Type: general ASA Status: 3          Anesthesia Type: No value filed.    Bran Phase I: Bran Score: 10    Barn Phase II:      Anesthesia Post Evaluation    Patient location during evaluation: ICU  Patient participation: complete - patient participated  Level of consciousness: awake and alert  Pain score: 0  Airway patency: patent  Nausea & Vomiting: no nausea and no vomiting  Cardiovascular status: hemodynamically stable  Respiratory status: acceptable  Hydration status: stable  Pain management: adequate        There were no known notable events for this encounter.

## 2024-02-13 NOTE — H&P
[facility]    Watchman Procedure    Date:   2/13/2024  Patient name: Rufino Armenta  Date of admission:  2/13/2024  8:26 AM  MRN:   [id number]  YOB: 1950    Operators:  Primary:       [x] Reba Servin M.D.             Pre Procedure Conscious Sedation Data:    ASA Class:    [] I [x] II [] III [] IV    Mallampati Class:  [] I [x] II [] III [] IV    Patient is documented atrial fibrillation   [x] Chronic  [] Paroxysmal    Treated with anticoagulation but due to multiple reasons considered now not the best candidate to continue and advised alternative measures. Some of the reasons are:    [] CVA  [] High risk for bleeding  [x] Recurrent Bleeding episodes  [] Risk of fall is high with needs for AC.  [] Patient refusal for Anticoagulation.    Patient has been seen by General cardiology and shared decision making has been made for pursuing JULIO closure.   Patient here for JULIO closure with Watchman device.       Transesophageal echocardiography was performed and measurements of left atrial appendage, including ostium size and depth were obtained for selection of appropriate watchman device in different positions (0,45,90,135)     MONIQUE Angle About 0 About 45 About 90 About 135   JULIO Ostial Width 1.99 cm 1.41 1.64 1.93   JULIO Depth 2.59 2.34 2.35 2.35     HISTORY OF PRESENT ILLNESS:      Obtained for patient and records, found with some contr indication for anticoagulation.     Echocardiogram in 2015 ejection fraction is 55%.  No significant valvular regurgitation or stenosis seen.  2. Coronary artery disease status post PCI at [facility].  Will try to obtain results.  3.  History of pacemaker insertion at [facility].  No records available will try to obtain.  4.  Paroxysmal atrial fibrillation.  Was taken off Coumadin due to episodes of bleeding.  Will try to obtain records from [facility].  5.  Essential hypertension.  6.  Type 2 diabetes mellitus followed by PCP.  7.

## 2024-02-13 NOTE — PROGRESS NOTES
Received post watchman procedure to [facility] room 6. Assessment obtained. Restrictions reviewed with patient. Post procedure pathway initiated.  Right groin site soft , dressing dry and intact.  No hematoma noted. Head of bed flat with right leg straight.

## 2024-02-13 NOTE — PLAN OF CARE
Problem: Chronic Conditions and Co-morbidities  Goal: Patient's chronic conditions and co-morbidity symptoms are monitored and maintained or improved  Outcome: Progressing     Problem: Discharge Planning  Goal: Discharge to home or other facility with appropriate resources  Outcome: Progressing     Problem: Safety - Adult  Goal: Free from fall injury  Outcome: Progressing     Problem: Pain  Goal: Verbalizes/displays adequate comfort level or baseline comfort level  Outcome: Progressing

## 2024-02-14 ENCOUNTER — APPOINTMENT (OUTPATIENT)
Age: 74
DRG: 274 | End: 2024-02-14
Attending: INTERNAL MEDICINE
Payer: MEDICARE

## 2024-02-14 VITALS
RESPIRATION RATE: 17 BRPM | BODY MASS INDEX: 31.58 KG/M2 | DIASTOLIC BLOOD PRESSURE: 89 MMHG | TEMPERATURE: 98.1 F | WEIGHT: 185 LBS | SYSTOLIC BLOOD PRESSURE: 116 MMHG | HEART RATE: 62 BPM | HEIGHT: 64 IN | OXYGEN SATURATION: 94 %

## 2024-02-14 LAB
ANION GAP SERPL CALCULATED.3IONS-SCNC: 12 MMOL/L (ref 9–17)
BUN SERPL-MCNC: 27 MG/DL (ref 8–23)
CALCIUM SERPL-MCNC: 10.1 MG/DL (ref 8.6–10.4)
CHLORIDE SERPL-SCNC: 104 MMOL/L (ref 98–107)
CO2 SERPL-SCNC: 21 MMOL/L (ref 20–31)
CREAT SERPL-MCNC: 1.1 MG/DL (ref 0.7–1.2)
ECHO BSA: 1.95 M2
ECHO LV EDV 3D: 130 ML
ECHO LV EDV INDEX 3D: 69 ML/M2
ECHO LV EJECTION FRACTION 3D: 55 %
ECHO LV ESV 3D: 58 ML
ECHO LV ESV INDEX 3D: 31 ML/M2
ERYTHROCYTE [DISTWIDTH] IN BLOOD BY AUTOMATED COUNT: 13.5 % (ref 11.8–14.4)
GFR SERPL CREATININE-BSD FRML MDRD: >60 ML/MIN/1.73M2
GLUCOSE BLD-MCNC: 199 MG/DL (ref 75–110)
GLUCOSE BLD-MCNC: 300 MG/DL (ref 75–110)
GLUCOSE SERPL-MCNC: 246 MG/DL (ref 70–99)
HCT VFR BLD AUTO: 49.9 % (ref 40.7–50.3)
HGB BLD-MCNC: 16.2 G/DL (ref 13–17)
MCH RBC QN AUTO: 29.7 PG (ref 25.2–33.5)
MCHC RBC AUTO-ENTMCNC: 32.5 G/DL (ref 28.4–34.8)
MCV RBC AUTO: 91.6 FL (ref 82.6–102.9)
NRBC BLD-RTO: 0 PER 100 WBC
PLATELET # BLD AUTO: 174 K/UL (ref 138–453)
PMV BLD AUTO: 11.1 FL (ref 8.1–13.5)
POTASSIUM SERPL-SCNC: 4.5 MMOL/L (ref 3.7–5.3)
RBC # BLD AUTO: 5.45 M/UL (ref 4.21–5.77)
SODIUM SERPL-SCNC: 137 MMOL/L (ref 135–144)
WBC OTHER # BLD: 13.4 K/UL (ref 3.5–11.3)

## 2024-02-14 PROCEDURE — 36415 COLL VENOUS BLD VENIPUNCTURE: CPT

## 2024-02-14 PROCEDURE — 6370000000 HC RX 637 (ALT 250 FOR IP): Performed by: INTERNAL MEDICINE

## 2024-02-14 PROCEDURE — 93308 TTE F-UP OR LMTD: CPT

## 2024-02-14 PROCEDURE — 82947 ASSAY GLUCOSE BLOOD QUANT: CPT

## 2024-02-14 PROCEDURE — 85027 COMPLETE CBC AUTOMATED: CPT

## 2024-02-14 PROCEDURE — 80048 BASIC METABOLIC PNL TOTAL CA: CPT

## 2024-02-14 PROCEDURE — 99238 HOSP IP/OBS DSCHRG MGMT 30/<: CPT | Performed by: SURGERY

## 2024-02-14 PROCEDURE — 6370000000 HC RX 637 (ALT 250 FOR IP): Performed by: STUDENT IN AN ORGANIZED HEALTH CARE EDUCATION/TRAINING PROGRAM

## 2024-02-14 PROCEDURE — 6360000002 HC RX W HCPCS: Performed by: INTERNAL MEDICINE

## 2024-02-14 RX ORDER — ASPIRIN 81 MG/1
81 TABLET ORAL DAILY
Qty: 30 TABLET | Refills: 3 | Status: SHIPPED | OUTPATIENT
Start: 2024-02-15

## 2024-02-14 RX ORDER — CLOPIDOGREL BISULFATE 75 MG/1
75 TABLET ORAL DAILY
Qty: 30 TABLET | Refills: 3 | Status: SHIPPED | OUTPATIENT
Start: 2024-02-15

## 2024-02-14 RX ADMIN — ALLOPURINOL 300 MG: 300 TABLET ORAL at 09:50

## 2024-02-14 RX ADMIN — GABAPENTIN 600 MG: 600 TABLET ORAL at 09:00

## 2024-02-14 RX ADMIN — INSULIN LISPRO 6 UNITS: 100 INJECTION, SOLUTION INTRAVENOUS; SUBCUTANEOUS at 11:52

## 2024-02-14 RX ADMIN — FUROSEMIDE 40 MG: 40 TABLET ORAL at 09:00

## 2024-02-14 RX ADMIN — METOPROLOL SUCCINATE 50 MG: 50 TABLET, FILM COATED, EXTENDED RELEASE ORAL at 09:00

## 2024-02-14 RX ADMIN — CLOPIDOGREL BISULFATE 75 MG: 75 TABLET, FILM COATED ORAL at 08:59

## 2024-02-14 RX ADMIN — TRAMADOL HYDROCHLORIDE 50 MG: 50 TABLET, COATED ORAL at 09:50

## 2024-02-14 RX ADMIN — ASPIRIN 81 MG: 81 TABLET, COATED ORAL at 08:59

## 2024-02-14 RX ADMIN — LOSARTAN POTASSIUM 25 MG: 25 TABLET, FILM COATED ORAL at 08:59

## 2024-02-14 RX ADMIN — ISOSORBIDE MONONITRATE 60 MG: 60 TABLET, EXTENDED RELEASE ORAL at 08:59

## 2024-02-14 RX ADMIN — Medication 2000 MG: at 00:16

## 2024-02-14 NOTE — PLAN OF CARE
Problem: Chronic Conditions and Co-morbidities  Goal: Patient's chronic conditions and co-morbidity symptoms are monitored and maintained or improved  2/14/2024 0615 by Dana Gorman RN  Outcome: Progressing  2/13/2024 1823 by Neida Julian RN  Outcome: Progressing     Problem: Discharge Planning  Goal: Discharge to home or other facility with appropriate resources  2/14/2024 0615 by Dana Gorman RN  Outcome: Progressing  2/13/2024 1823 by Neida Julian RN  Outcome: Progressing     Problem: Safety - Adult  Goal: Free from fall injury  2/14/2024 0615 by Dana Gorman RN  Outcome: Progressing  2/13/2024 1823 by Neida Julian RN  Outcome: Progressing     Problem: Pain  Goal: Verbalizes/displays adequate comfort level or baseline comfort level  2/14/2024 0615 by Dana Gorman RN  Outcome: Progressing  2/13/2024 1823 by Neida Julian RN  Outcome: Progressing

## 2024-02-14 NOTE — DISCHARGE SUMMARY
DIRECTED FOR DIABETES   * TRUEplus Lancets 33G Misc; 1 Dose by Does not apply route 4 times   daily as needed (blood sugar check)   allopurinol 300 MG tablet; Commonly known as: ZYLOPRIM   atorvastatin 40 MG tablet; Commonly known as: LIPITOR; TAKE 1 TABLET AT   BEDTIME   B-D SINGLE USE SWABS REGULAR Pads; 1 Application by Does not apply route   4 times daily as needed (testing)   furosemide 40 MG tablet; Commonly known as: LASIX   gabapentin 600 MG tablet; Commonly known as: NEURONTIN; Take 1 tablet by   mouth 3 times daily for 90 days.   insulin 70-30 (70-30) 100 UNIT per ML injection vial; Commonly known as:   NovoLIN 70/30; Inject 25 Units into the skin 2 times daily (before meals)   ipratropium 0.06 % nasal spray; Commonly known as: ATROVENT; 2 sprays by   Each Nostril route 3 times daily   isosorbide mononitrate 60 MG extended release tablet; Commonly known as:   IMDUR; TAKE 1 TABLET EVERY MORNING   losartan 25 MG tablet; Commonly known as: COZAAR   metoprolol succinate 50 MG extended release tablet; Commonly known as:   TOPROL XL   nitroGLYCERIN 0.4 MG SL tablet; Commonly known as: NITROSTAT   ReliOn True Metrix Test Strips strip; Generic drug: blood glucose test   strips; 1 each by In Vitro route 4 times daily as needed (blood sugar   checks)   tamsulosin 0.4 MG capsule; Commonly known as: FLOMAX; Take 1 capsule by   mouth daily.   traMADol 50 MG tablet; Commonly known as: ULTRAM   True Metrix Air Glucose Meter Haley; 1 m by Does not apply route as   needed (UAD for four times daily testing)  * This list has 2 medication(s) that are the same as other medications   prescribed for you. Read the directions carefully, and ask your doctor or   other care provider to review them with you.     STOP taking these medications     apixaban 5 MG Tabs tablet; Commonly known as: Eliquis      Discussed with patient and nursing. Medications and discharge instructions reviewed with patient. Cardiac stable - Discussed in

## 2024-02-14 NOTE — CARE COORDINATION
Case Management Assessment  Initial Evaluation    Date/Time of Evaluation: 2/14/2024 12:15 PM  Assessment Completed by: Stella Montilla RN    If patient is discharged prior to next notation, then this note serves as note for discharge by case management.    Patient Name: Rufino Armenta                   YOB: 1950  Diagnosis: Atrial fibrillation, unspecified type (HCC) [I48.91]  Presence of Watchman left atrial appendage closure device [Z95.818]  PAF (paroxysmal atrial fibrillation) (HCC) [I48.0]                   Date / Time: 2/13/2024  8:26 AM    Patient Admission Status: Inpatient   Readmission Risk (Low < 19, Mod (19-27), High > 27): Readmission Risk Score: 10.3    Current PCP: Jesús Suárez MD  PCP verified by CM? (P) Yes (Jseús Suárez MD)    Chart Reviewed: Yes      History Provided by: (P) Patient  Patient Orientation: (P) Alert and Oriented, Person, Place, Situation    Patient Cognition: (P) Alert    Hospitalization in the last 30 days (Readmission):  No    If yes, Readmission Assessment in  Navigator will be completed.    Advance Directives:      Code Status: Full Code   Patient's Primary Decision Maker is: (P) Legal Next of Kin    Primary Decision Maker: Libby Armenta \"Henny\" - Spouse - [phone]    Discharge Planning:    Patient lives with: (P) Spouse/Significant Other Type of Home: (P) Trailer/Mobile Home  Primary Care Giver: (P) Self  Patient Support Systems include: (P) Spouse/Significant Other, Children, Family Members   Current Financial resources: (P) Medicare  Current community resources: (P) None  Current services prior to admission: (P) Durable Medical Equipment            Current DME: (P) Cane, Glucometer            Type of Home Care services:  (P) None    ADLS  Prior functional level: (P) Independent in ADLs/IADLs  Current functional level: (P) Independent in ADLs/IADLs    PT AM-PAC:   /24  OT AM-PAC:   /24    Family can provide assistance at DC: (P) Yes  Would you

## 2024-02-14 NOTE — PROGRESS NOTES
CLINICAL PHARMACY NOTE: MEDS TO BEDS    Total # of Prescriptions Filled: 2   The following medications were delivered to the patient:  Aspirin  plavix    Additional Documentation:

## 2024-02-15 ENCOUNTER — TELEPHONE (OUTPATIENT)
Age: 74
End: 2024-02-15

## 2024-02-15 NOTE — TELEPHONE ENCOUNTER
Care Transitions Initial Follow Up Call    Outreach made within 2 business days of discharge: Yes    Patient: Rufino Armenta Patient : 1950   MRN: [id number]  Reason for Admission: There are no discharge diagnoses documented for the most recent discharge.  Discharge Date: 24           Discharge department/facility: [facility]    Called patient and left a message to call back so we can schedule him for a post hospital appt with dr suárez.    Scheduled appointment with PCP within 7-14 days    Follow Up  Future Appointments   Date Time Provider Department Center   2024  1:30 PM Romelia Mojica, APRN - CNP [facility]   3/15/2024 10:45 AM [facility]       Carmen Bajwa MA

## 2024-02-15 NOTE — TELEPHONE ENCOUNTER
Rufino Armenta is calling to request a refill on the following medication(s):    Medication Request:  Requested Prescriptions     Pending Prescriptions Disp Refills    TRUEplus Lancets 33G MISC [Pharmacy Med Name: TRUEPLUS LANCETS 33G] 400 each 3     Sig: TEST BLOOD SUGAR 4 TIMES DAILY AS NEEDED       Last Visit Date (If Applicable):  12/13/2023    Next Visit Date:    3/15/2024

## 2024-02-16 RX ORDER — GLUCOSAM/CHON-MSM1/C/MANG/BOSW 500-416.6
TABLET ORAL
Qty: 400 EACH | Refills: 3 | Status: SHIPPED | OUTPATIENT
Start: 2024-02-16

## 2024-02-20 NOTE — TELEPHONE ENCOUNTER
Care Transitions Initial Follow Up Call    Outreach made within 2 business days of discharge: Yes    Patient: Rufino Armenta Patient : 1950   MRN: [id number]  Reason for Admission: There are no discharge diagnoses documented for the most recent discharge.  Discharge Date: 24       Spoke with: Rufino    Discharge department/facility: [facility] Interactive Patient Contact:  Was patient able to fill all prescriptions: Yes  Was patient instructed to bring all medications to the follow-up visit: Yes  Is patient taking all medications as directed in the discharge summary? Yes  Does patient understand their discharge instructions: Yes  Does patient have questions or concerns that need addressed prior to 7-14 day follow up office visit: no    Scheduled appointment with PCP within 7-14 days    Follow Up  Future Appointments   Date Time Provider Department Center   2024 10:00 AM Jesús Suárez MD [place] [facility]   2024  1:30 PM Romelia Mojica, APRN - CNP [facility]   3/15/2024 10:45 AM Jesús Suárez MD [place] [facility]       Amina Deleon

## 2024-02-22 LAB
ABSOLUTE BASO #: 0.02 K/UL (ref 0–0.2)
ABSOLUTE EOS #: 0.09 K/UL (ref 0–0.5)
ABSOLUTE LYMPH #: 1.49 K/UL (ref 1–4)
ABSOLUTE MONO #: 0.64 K/UL (ref 0.2–1)
ABSOLUTE NEUT #: 3.1 K/UL (ref 1.5–7.5)
ALBUMIN SERPL-MCNC: 4.3 G/DL (ref 3.5–5.2)
ALK PHOSPHATASE: 78 U/L (ref 40–125)
ALT SERPL-CCNC: 31 U/L (ref 5–50)
ANION GAP SERPL CALCULATED.3IONS-SCNC: 11 MEQ/L (ref 7–16)
AST SERPL-CCNC: 26 U/L (ref 9–50)
BASOPHILS RELATIVE PERCENT: 0.4 %
BILIRUB SERPL-MCNC: 0.9 MG/DL
BUN BLDV-MCNC: 30 MG/DL (ref 8–23)
CALCIUM SERPL-MCNC: 10.8 MG/DL (ref 8.5–10.5)
CHLORIDE BLD-SCNC: 105 MEQ/L (ref 95–107)
CHOLESTEROL/HDL RATIO: 3.9 RATIO
CHOLESTEROL: 157 MG/DL
CO2: 25 MEQ/L (ref 19–31)
CREAT SERPL-MCNC: 1.24 MG/DL (ref 0.8–1.4)
EGFR IF NONAFRICAN AMERICAN: 62 ML/MIN/1.73
EOSINOPHILS RELATIVE PERCENT: 1.7 %
ESTIMATED AVERAGE GLUCOSE: 197 MG/DL
GLUCOSE: 175 MG/DL (ref 70–99)
HBA1C MFR BLD: 8.5 % (ref 4.2–5.6)
HCT VFR BLD CALC: 46.4 % (ref 40–51)
HDLC SERPL-MCNC: 40 MG/DL
HEMOGLOBIN: 15.7 G/DL (ref 13.5–17)
LDL CHOLESTEROL CALCULATED: 55 MG/DL
LDL/HDL RATIO: 1.4 RATIO
LYMPHOCYTE %: 27.8 %
MCH RBC QN AUTO: 30.3 PG (ref 25–33)
MCHC RBC AUTO-ENTMCNC: 33.8 G/DL (ref 31–36)
MCV RBC AUTO: 89.6 FL (ref 80–99)
MONOCYTES # BLD: 11.9 %
NEUTROPHILS RELATIVE PERCENT: 57.8 %
PDW BLD-RTO: 13.2 % (ref 11.5–15)
PLATELETS: 177 K/UL (ref 130–400)
PMV BLD AUTO: 11.1 FL (ref 9.3–13)
POTASSIUM SERPL-SCNC: 4.2 MEQ/L (ref 3.5–5.4)
RBC: 5.18 M/UL (ref 4.5–6.1)
SODIUM BLD-SCNC: 141 MEQ/L (ref 133–146)
TOTAL PROTEIN: 6.5 G/DL (ref 6.1–8.3)
TRIGL SERPL-MCNC: 308 MG/DL
VLDLC SERPL CALC-MCNC: 62 MG/DL
WBC: 5.4 K/UL (ref 3.5–11)

## 2024-02-22 NOTE — TELEPHONE ENCOUNTER
Rufino Armenta is calling to request a refill on the following medication(s):    Medication Request:  Requested Prescriptions     Pending Prescriptions Disp Refills    TRUE METRIX BLOOD GLUCOSE TEST strip [Pharmacy Med Name: TRUE METRIX SELF MONITORING BLOOD GLUCOSE STRIPS   Strip] 400 strip 3     Sig: TEST BLOOD SUGAR FOUR TIMES DAILY AS NEEDED       Last Visit Date (If Applicable):  12/13/2023    Next Visit Date:    2/26/2024

## 2024-02-23 RX ORDER — CALCIUM CITRATE/VITAMIN D3 200MG-6.25
TABLET ORAL
Qty: 400 STRIP | Refills: 3 | Status: SHIPPED | OUTPATIENT
Start: 2024-02-23

## 2024-02-26 ENCOUNTER — OFFICE VISIT (OUTPATIENT)
Age: 74
End: 2024-02-26

## 2024-02-26 VITALS
SYSTOLIC BLOOD PRESSURE: 120 MMHG | HEIGHT: 64 IN | OXYGEN SATURATION: 98 % | HEART RATE: 67 BPM | BODY MASS INDEX: 31.89 KG/M2 | WEIGHT: 186.8 LBS | DIASTOLIC BLOOD PRESSURE: 80 MMHG | RESPIRATION RATE: 18 BRPM | TEMPERATURE: 98.2 F

## 2024-02-26 DIAGNOSIS — I25.118 CORONARY ARTERY DISEASE OF NATIVE ARTERY OF NATIVE HEART WITH STABLE ANGINA PECTORIS (HCC): ICD-10-CM

## 2024-02-26 DIAGNOSIS — E11.42 TYPE 2 DIABETES MELLITUS WITH DIABETIC POLYNEUROPATHY, WITH LONG-TERM CURRENT USE OF INSULIN (HCC): Primary | ICD-10-CM

## 2024-02-26 DIAGNOSIS — I50.9 CONGESTIVE HEART FAILURE, NYHA CLASS 3, UNSPECIFIED CONGESTIVE HEART FAILURE TYPE (HCC): ICD-10-CM

## 2024-02-26 DIAGNOSIS — Z79.4 TYPE 2 DIABETES MELLITUS WITH DIABETIC POLYNEUROPATHY, WITH LONG-TERM CURRENT USE OF INSULIN (HCC): Primary | ICD-10-CM

## 2024-02-26 DIAGNOSIS — Z95.818 PRESENCE OF WATCHMAN LEFT ATRIAL APPENDAGE CLOSURE DEVICE: ICD-10-CM

## 2024-02-26 RX ORDER — CLOPIDOGREL BISULFATE 75 MG/1
75 TABLET ORAL DAILY
Qty: 90 TABLET | Refills: 0 | Status: SHIPPED | OUTPATIENT
Start: 2024-02-26 | End: 2024-05-26

## 2024-02-26 ASSESSMENT — PATIENT HEALTH QUESTIONNAIRE - PHQ9
2. FEELING DOWN, DEPRESSED OR HOPELESS: 0
SUM OF ALL RESPONSES TO PHQ QUESTIONS 1-9: 0
SUM OF ALL RESPONSES TO PHQ9 QUESTIONS 1 & 2: 0
SUM OF ALL RESPONSES TO PHQ QUESTIONS 1-9: 0
1. LITTLE INTEREST OR PLEASURE IN DOING THINGS: 0
SUM OF ALL RESPONSES TO PHQ QUESTIONS 1-9: 0
SUM OF ALL RESPONSES TO PHQ QUESTIONS 1-9: 0

## 2024-02-26 ASSESSMENT — ENCOUNTER SYMPTOMS
SHORTNESS OF BREATH: 0
CHEST TIGHTNESS: 0

## 2024-02-27 ENCOUNTER — TELEPHONE (OUTPATIENT)
Age: 74
End: 2024-02-27

## 2024-02-29 NOTE — TELEPHONE ENCOUNTER
Ciarra from [employer]  Is calling about the PA for the Tobrex ointment.  Ref# [id number].  Call back number is [phone]

## 2024-03-04 RX ORDER — SYRGE-NDL,INS 0.5 ML HALF MARK 31GX15/64"
SYRINGE, EMPTY DISPOSABLE MISCELLANEOUS
Qty: 100 EACH | Refills: 5 | Status: SHIPPED | OUTPATIENT
Start: 2024-03-04

## 2024-03-04 RX ORDER — SYRGE-NDL,INS 0.5 ML HALF MARK 31GX15/64"
SYRINGE, EMPTY DISPOSABLE MISCELLANEOUS
COMMUNITY
End: 2024-03-04 | Stop reason: SDUPTHER

## 2024-03-04 NOTE — TELEPHONE ENCOUNTER
Rufino Armenta is calling to request a refill on the following medication(s):    Medication Request:  Requested Prescriptions     Pending Prescriptions Disp Refills    Insulin Syringe-Needle U-100 (DROPLET INSULIN SYRINGE) 31G X 15/64\" 0.5 ML MISC       Sig: by Does not apply route       Last Visit Date (If Applicable):  2/26/2024    Next Visit Date:    5/29/2024

## 2024-03-15 RX ORDER — ISOPROPYL ALCOHOL 70 ML/100ML
SWAB TOPICAL
Qty: 400 EACH | Refills: 3 | Status: SHIPPED | OUTPATIENT
Start: 2024-03-15

## 2024-03-28 RX ORDER — DIPHENHYDRAMINE HCL 25 MG
TABLET ORAL
Qty: 1 KIT | Refills: 3 | Status: SHIPPED | OUTPATIENT
Start: 2024-03-28

## 2024-03-28 RX ORDER — IPRATROPIUM BROMIDE 42 UG/1
SPRAY, METERED NASAL
Qty: 60 ML | Refills: 2 | Status: SHIPPED | OUTPATIENT
Start: 2024-03-28

## 2024-03-28 NOTE — TELEPHONE ENCOUNTER
Rufino Armenta is calling to request a refill on the following medication(s):    Medication Request:  Requested Prescriptions     Pending Prescriptions Disp Refills    Blood Glucose Monitoring Suppl (TRUE METRIX AIR GLUCOSE METER) w/Device KIT [Pharmacy Med Name: TRUE METRIX AIR BLOOD GLUCOSE METER/BLUETOOTH SMART w/Device  Kit] 1 kit 3     Sig: USE AS DIRECTED    ipratropium (ATROVENT) 0.06 % nasal spray [Pharmacy Med Name: IPRATROPIUM BROMIDE 0.06 % Solution] 60 mL      Sig: USE 2 SPRAYS IN EACH NOSTRIL THREE TIMES DAILY       Last Visit Date (If Applicable):  2/26/2024    Next Visit Date:    5/29/2024

## 2024-04-08 RX ORDER — ISOSORBIDE MONONITRATE 60 MG/1
90 TABLET, EXTENDED RELEASE ORAL EVERY MORNING
Qty: 45 TABLET | Refills: 11 | Status: SHIPPED | OUTPATIENT
Start: 2024-04-08

## 2024-04-12 RX ORDER — METOPROLOL SUCCINATE 50 MG/1
50 TABLET, EXTENDED RELEASE ORAL DAILY
Qty: 90 TABLET | Refills: 3 | Status: SHIPPED | OUTPATIENT
Start: 2024-04-12

## 2024-05-01 ENCOUNTER — HOSPITAL ENCOUNTER (OUTPATIENT)
Age: 74
Setting detail: OUTPATIENT SURGERY
Discharge: HOME | End: 2024-05-03
Attending: INTERNAL MEDICINE
Payer: MEDICARE

## 2024-05-01 VITALS
SYSTOLIC BLOOD PRESSURE: 120 MMHG | BODY MASS INDEX: 32.43 KG/M2 | RESPIRATION RATE: 13 BRPM | HEART RATE: 60 BPM | TEMPERATURE: 97.5 F | OXYGEN SATURATION: 96 % | HEIGHT: 63 IN | DIASTOLIC BLOOD PRESSURE: 77 MMHG | WEIGHT: 183 LBS

## 2024-05-01 DIAGNOSIS — Z95.0 S/P PLACEMENT OF CARDIAC PACEMAKER: ICD-10-CM

## 2024-05-01 DIAGNOSIS — I50.9 CHF (CONGESTIVE HEART FAILURE), NYHA CLASS III (HCC): Primary | ICD-10-CM

## 2024-05-01 DIAGNOSIS — Z95.818 PRESENCE OF WATCHMAN LEFT ATRIAL APPENDAGE CLOSURE DEVICE: ICD-10-CM

## 2024-05-01 LAB
BUN BLD-MCNC: 20 MG/DL (ref 8–26)
CHLORIDE BLD-SCNC: 106 MMOL/L (ref 98–107)
ECHO BSA: 1.92 M2
EGFR, POC: >90 ML/MIN/1.73M2
GLUCOSE BLD-MCNC: 159 MG/DL (ref 74–100)
HCT VFR BLD AUTO: 54 % (ref 41–53)
POC CREATININE: 0.9 MG/DL (ref 0.51–1.19)
POC HEMOGLOBIN (CALC): 18.3 G/DL (ref 13.5–17.5)
POTASSIUM BLD-SCNC: 4.4 MMOL/L (ref 3.5–4.5)
SODIUM BLD-SCNC: 144 MMOL/L (ref 138–146)

## 2024-05-01 PROCEDURE — 82565 ASSAY OF CREATININE: CPT

## 2024-05-01 PROCEDURE — 84132 ASSAY OF SERUM POTASSIUM: CPT

## 2024-05-01 PROCEDURE — 85014 HEMATOCRIT: CPT

## 2024-05-01 PROCEDURE — 82435 ASSAY OF BLOOD CHLORIDE: CPT

## 2024-05-01 PROCEDURE — 93325 DOPPLER ECHO COLOR FLOW MAPG: CPT | Performed by: INTERNAL MEDICINE

## 2024-05-01 PROCEDURE — 93312 ECHO TRANSESOPHAGEAL: CPT

## 2024-05-01 PROCEDURE — 6360000002 HC RX W HCPCS: Performed by: STUDENT IN AN ORGANIZED HEALTH CARE EDUCATION/TRAINING PROGRAM

## 2024-05-01 PROCEDURE — 7100000011 HC PHASE II RECOVERY - ADDTL 15 MIN: Performed by: STUDENT IN AN ORGANIZED HEALTH CARE EDUCATION/TRAINING PROGRAM

## 2024-05-01 PROCEDURE — 93312 ECHO TRANSESOPHAGEAL: CPT | Performed by: INTERNAL MEDICINE

## 2024-05-01 PROCEDURE — 84520 ASSAY OF UREA NITROGEN: CPT

## 2024-05-01 PROCEDURE — 6370000000 HC RX 637 (ALT 250 FOR IP): Performed by: STUDENT IN AN ORGANIZED HEALTH CARE EDUCATION/TRAINING PROGRAM

## 2024-05-01 PROCEDURE — 2580000003 HC RX 258: Performed by: INTERNAL MEDICINE

## 2024-05-01 PROCEDURE — 84295 ASSAY OF SERUM SODIUM: CPT

## 2024-05-01 PROCEDURE — 7100000010 HC PHASE II RECOVERY - FIRST 15 MIN: Performed by: STUDENT IN AN ORGANIZED HEALTH CARE EDUCATION/TRAINING PROGRAM

## 2024-05-01 PROCEDURE — 82947 ASSAY GLUCOSE BLOOD QUANT: CPT

## 2024-05-01 RX ORDER — SODIUM CHLORIDE 9 MG/ML
INJECTION, SOLUTION INTRAVENOUS CONTINUOUS
Status: DISCONTINUED | OUTPATIENT
Start: 2024-05-01 | End: 2024-05-04 | Stop reason: HOSPADM

## 2024-05-01 RX ORDER — MIDAZOLAM HYDROCHLORIDE 1 MG/ML
INJECTION INTRAMUSCULAR; INTRAVENOUS PRN
Status: COMPLETED | OUTPATIENT
Start: 2024-05-01 | End: 2024-05-01

## 2024-05-01 RX ORDER — LIDOCAINE HYDROCHLORIDE 20 MG/ML
SOLUTION OROPHARYNGEAL PRN
Status: COMPLETED | OUTPATIENT
Start: 2024-05-01 | End: 2024-05-01

## 2024-05-01 RX ADMIN — LIDOCAINE HYDROCHLORIDE 5 ML: 20 SOLUTION ORAL; TOPICAL at 11:11

## 2024-05-01 RX ADMIN — MIDAZOLAM 1 MG: 1 INJECTION INTRAMUSCULAR; INTRAVENOUS at 11:12

## 2024-05-01 RX ADMIN — SODIUM CHLORIDE: 9 INJECTION, SOLUTION INTRAVENOUS at 10:14

## 2024-05-01 RX ADMIN — BENZOCAINE, BUTAMBEN, AND TETRACAINE HYDROCHLORIDE 3 SPRAY: .028; .004; .004 AEROSOL, SPRAY TOPICAL at 11:10

## 2024-05-01 RX ADMIN — FENTANYL CITRATE 25 MCG: 50 INJECTION INTRAMUSCULAR; INTRAVENOUS at 11:12

## 2024-05-01 NOTE — PROGRESS NOTES
TRANSFER - OUT REPORT:    Verbal report given to Monique on Rufino Armenta being transferred to [facility] for routine post-op       Report consisted of patient's Situation, Background, Assessment and   Recommendations(SBAR).     Information from the following report(s) Nurse Handoff Report was reviewed with the receiving nurse.    Opportunity for questions and clarification was provided.      Patient transported with:   Tech

## 2024-05-01 NOTE — PROGRESS NOTES
Patient admitted, consent signed and questions answered. Patient ready for procedure. Call light to reach with side rails up 2 of 2.  Family at bedside with patient.  History and physical needs completed.

## 2024-05-01 NOTE — H&P
TID ratio is 1.11.    ECG: The ECG was not diagnostic due to paced rhythm.    Stress Test: A pharmacological stress test was performed using lexiscan. PO caffeine given as a reversal agent. The patient reported no symptoms during the stress test.     Watchman 2/13/24  Assessment and Summary:     Successful deployment of left atrial appendage occlusion device with no complication     WATCHMAN device used 27 mm size      Angiogram revealed good seal and no thrombus      MONIQUE confirmed placement and seal  Plan:      The patient will be admitted and have usual post care  Start ASA 81 mg po daily and Plavix 75 mg po daily for 6 month, then ASA 81 mg po daily.   Echocardiogram tomorrow   Patient is participating in the left atrial appendage occlusion/closure registry. LAAO Registry is approved by the Centers for Medicare and Medicaid Services (CMS) to meet the registry requirements outlined in the national coverage decisions for Percutaneous Left Atrial Appendage Closure  FU MONIQUE in 6-8 weeks to monitor device stability and decide about AC as recommended.     Post Watchman Echo 2/15/24    Left Ventricle: Normal left ventricular systolic function. EF 3D is 55%.    Right Ventricle: Lead present in the right ventricle.    Aortic Valve: Mildly calcified cusp.    Left Atrium: Device closure in the appendage with a Watchman.    Right Atrium: Multiple leads present in the right atrium.    Pericardium: No pericardial effusion.    IVC/SVC: IVC diameter is greater than 21 mm and decreases less than 50% during inspiration; therefore the estimated right atrial pressure is elevated (~15 mmHg).    Image quality is adequate.      ASSESSMENT AND PLAN:  History of atrial fibrillation& was taken off Coumadin due to GI bleed.Hx of JULIO clot dissolved on last echo. Now s/p Watchman as above.  Continue ASA & Plavix for now, will plan to stop Plavix after 6mo. Per Dr. Servin note. Will schedule repeat echo in 6-8wks from device implant.   Coronary

## 2024-05-01 NOTE — DISCHARGE INSTRUCTIONS
TRANSESOPHAGEAL ECHOCARDIOGRAM / MONIQUE DISCHARGE INSTRUCTIONS    If the following occurs call your physician or seek help    -trouble with swallowing    -spitting or coughing up blood    -severe pain in the throat region / your throat can be sore for several days but pain should not increase as days continue    Do not drive or operate heavy machinery today due to sedation            Transesophageal Echocardiogram:  What is a transesophageal echocardiogram?     A transesophageal echocardiogram is a test to help your doctor look at the inside of your heart. A small device called a transducer directs sound waves toward your heart. The sound waves make a picture of the heart's valves and chambers.  Your doctor may do this test to look for certain types of heart disease. Or it may be done to see how disease is affecting your heart.  You will be given medicine to make you sleepy and comfortable during the test.  The doctor puts a small, flexible tube into your throat and guides it to the esophagus. This is the tube that connects your mouth to your stomach. The doctor will ask you to swallow as the tube goes down.  The transducer is at the tip of the tube. It gets close to your heart to make clear pictures. The doctor will look at the ultrasound pictures on a screen.  You will not be able to eat or drink until the numbness from the throat spray wears off. Your throat may be sore for a few days after the test.  Follow-up care is a key part of your treatment and safety. Be sure to make and go to all appointments, and call your doctor if you are having problems. It's also a good idea to know your test results and keep a list of your current medications.  Going home  Be sure you have someone to drive you home. Anesthesia and pain medicine make it unsafe for you to drive.      Where can you learn more?    Go to [email or web address] and sign in to your [facility] [employer] [facility] account. Enter K500 in the [place]

## 2024-05-01 NOTE — PROGRESS NOTES
Received post MONIQUE procedure to PCC room 4. Assessment obtained. Restrictions reviewed with patient. Post procedure pathway initiated. Family at side.  Patient without complaints.

## 2024-05-01 NOTE — PROGRESS NOTES
All discharge instructions reviewed, questions answered, paper signed and given copy. Patient discharged per ambulatory with wife, d/c paperwork and belongings.

## 2024-05-16 NOTE — TELEPHONE ENCOUNTER
Rufino Armenta is calling to request a refill on the following medication(s):    Medication Request:  Requested Prescriptions     Pending Prescriptions Disp Refills    furosemide (LASIX) 40 MG tablet [Pharmacy Med Name: FUROSEMIDE 40 MG Tablet] 90 tablet 3     Sig: TAKE 1 TABLET EVERY DAY       Last Visit Date (If Applicable):  2/26/2024    Next Visit Date:    5/29/2024

## 2024-05-17 RX ORDER — FUROSEMIDE 40 MG/1
40 TABLET ORAL DAILY
Qty: 90 TABLET | Refills: 3 | Status: SHIPPED | OUTPATIENT
Start: 2024-05-17

## 2024-05-22 LAB
ALBUMIN: 4.4 G/DL (ref 3.5–5.2)
ALK PHOSPHATASE: 85 U/L (ref 40–125)
ALT SERPL-CCNC: 36 U/L (ref 5–50)
ANION GAP SERPL CALCULATED.3IONS-SCNC: 11 MEQ/L (ref 7–16)
AST SERPL-CCNC: 30 U/L (ref 9–50)
BASOPHILS ABSOLUTE: 0.03 K/UL (ref 0–0.2)
BASOPHILS RELATIVE PERCENT: 0.7 %
BILIRUB SERPL-MCNC: 0.7 MG/DL
BUN BLDV-MCNC: 22 MG/DL (ref 8–23)
CALCIUM SERPL-MCNC: 10.7 MG/DL (ref 8.5–10.5)
CHLORIDE BLD-SCNC: 103 MEQ/L (ref 95–107)
CHOLESTEROL, TOTAL: 165 MG/DL
CHOLESTEROL/HDL RATIO: 4 RATIO
CO2: 24 MEQ/L (ref 19–31)
CREAT SERPL-MCNC: 1.11 MG/DL (ref 0.8–1.4)
EGFR IF NONAFRICAN AMERICAN: 70 ML/MIN/1.73
EOSINOPHILS ABSOLUTE: 0.08 K/UL (ref 0–0.5)
EOSINOPHILS RELATIVE PERCENT: 1.8 %
ESTIMATED AVERAGE GLUCOSE: 160 MG/DL
GLUCOSE: 173 MG/DL (ref 70–99)
HBA1C MFR BLD: 7.2 % (ref 4.2–5.6)
HCT VFR BLD CALC: 49.8 % (ref 40–51)
HDLC SERPL-MCNC: 41 MG/DL
HEMOGLOBIN: 16.2 G/DL (ref 13.5–17)
IMMATURE GRANS (ABS): 0.01
IMMATURE GRANULOCYTES %: 0.2 %
LDL CHOLESTEROL DIRECT: 62 MG/DL
LDL CHOLESTEROL: ABNORMAL MG/DL
LDL/HDL RATIO: ABNORMAL RATIO
LYMPHOCYTES ABSOLUTE: 1.43 K/UL (ref 1–4)
LYMPHOCYTES RELATIVE PERCENT: 32.4 %
MCH RBC QN AUTO: 29.9 PG (ref 25–33)
MCHC RBC AUTO-ENTMCNC: 32.5 G/DL (ref 31–36)
MCV RBC AUTO: 91.9 FL (ref 80–99)
MICROALBUMIN/CREAT 24H UR: <1.2 MG/DL
MONOCYTES ABSOLUTE: 0.42 K/UL (ref 0.2–1)
MONOCYTES RELATIVE PERCENT: 9.5 %
NEUTROPHILS ABSOLUTE: 2.44 K/UL (ref 1.5–7.5)
NEUTROPHILS RELATIVE PERCENT: 55.4 %
PDW BLD-RTO: 13.3 % (ref 11.5–15)
PLATELET # BLD: 153 K/UL (ref 130–400)
PMV BLD AUTO: 10.7 FL (ref 9.3–13)
POTASSIUM SERPL-SCNC: 4.5 MEQ/L (ref 3.5–5.4)
RBC # BLD: 5.42 M/UL (ref 4.5–6.1)
SODIUM BLD-SCNC: 138 MEQ/L (ref 133–146)
TOTAL PROTEIN: 6.8 G/DL (ref 6.1–8.3)
TRIGL SERPL-MCNC: 407 MG/DL
VLDLC SERPL CALC-MCNC: ABNORMAL MG/DL
WBC # BLD: 4.4 K/UL (ref 3.5–11)

## 2024-05-27 SDOH — ECONOMIC STABILITY: FOOD INSECURITY: WITHIN THE PAST 12 MONTHS, YOU WORRIED THAT YOUR FOOD WOULD RUN OUT BEFORE YOU GOT MONEY TO BUY MORE.: NEVER TRUE

## 2024-05-27 SDOH — ECONOMIC STABILITY: FOOD INSECURITY: WITHIN THE PAST 12 MONTHS, THE FOOD YOU BOUGHT JUST DIDN'T LAST AND YOU DIDN'T HAVE MONEY TO GET MORE.: NEVER TRUE

## 2024-05-27 SDOH — ECONOMIC STABILITY: TRANSPORTATION INSECURITY
IN THE PAST 12 MONTHS, HAS LACK OF TRANSPORTATION KEPT YOU FROM MEETINGS, WORK, OR FROM GETTING THINGS NEEDED FOR DAILY LIVING?: NO

## 2024-05-27 SDOH — ECONOMIC STABILITY: INCOME INSECURITY: HOW HARD IS IT FOR YOU TO PAY FOR THE VERY BASICS LIKE FOOD, HOUSING, MEDICAL CARE, AND HEATING?: NOT HARD AT ALL

## 2024-05-29 ENCOUNTER — OFFICE VISIT (OUTPATIENT)
Age: 74
End: 2024-05-29
Payer: MEDICARE

## 2024-05-29 VITALS
DIASTOLIC BLOOD PRESSURE: 76 MMHG | HEART RATE: 67 BPM | WEIGHT: 186.8 LBS | TEMPERATURE: 98.2 F | RESPIRATION RATE: 18 BRPM | SYSTOLIC BLOOD PRESSURE: 122 MMHG | BODY MASS INDEX: 33.1 KG/M2 | HEIGHT: 63 IN | OXYGEN SATURATION: 100 %

## 2024-05-29 DIAGNOSIS — Z95.0 PACEMAKER: ICD-10-CM

## 2024-05-29 DIAGNOSIS — Z95.818 PRESENCE OF WATCHMAN LEFT ATRIAL APPENDAGE CLOSURE DEVICE: ICD-10-CM

## 2024-05-29 DIAGNOSIS — I48.0 PAROXYSMAL ATRIAL FIBRILLATION (HCC): ICD-10-CM

## 2024-05-29 DIAGNOSIS — Z79.4 TYPE 2 DIABETES MELLITUS WITH DIABETIC POLYNEUROPATHY, WITH LONG-TERM CURRENT USE OF INSULIN (HCC): Primary | ICD-10-CM

## 2024-05-29 DIAGNOSIS — Z85.46 HISTORY OF PROSTATE CANCER: ICD-10-CM

## 2024-05-29 DIAGNOSIS — H60.8X3 CHRONIC ECZEMATOUS OTITIS EXTERNA OF BOTH EARS: ICD-10-CM

## 2024-05-29 DIAGNOSIS — E11.42 TYPE 2 DIABETES MELLITUS WITH DIABETIC POLYNEUROPATHY, WITH LONG-TERM CURRENT USE OF INSULIN (HCC): Primary | ICD-10-CM

## 2024-05-29 DIAGNOSIS — E83.52 HYPERCALCEMIA: ICD-10-CM

## 2024-05-29 PROCEDURE — 1036F TOBACCO NON-USER: CPT | Performed by: FAMILY MEDICINE

## 2024-05-29 PROCEDURE — 3074F SYST BP LT 130 MM HG: CPT | Performed by: FAMILY MEDICINE

## 2024-05-29 PROCEDURE — 2022F DILAT RTA XM EVC RTNOPTHY: CPT | Performed by: FAMILY MEDICINE

## 2024-05-29 PROCEDURE — G8417 CALC BMI ABV UP PARAM F/U: HCPCS | Performed by: FAMILY MEDICINE

## 2024-05-29 PROCEDURE — 3017F COLORECTAL CA SCREEN DOC REV: CPT | Performed by: FAMILY MEDICINE

## 2024-05-29 PROCEDURE — G8427 DOCREV CUR MEDS BY ELIG CLIN: HCPCS | Performed by: FAMILY MEDICINE

## 2024-05-29 PROCEDURE — 3078F DIAST BP <80 MM HG: CPT | Performed by: FAMILY MEDICINE

## 2024-05-29 PROCEDURE — 99214 OFFICE O/P EST MOD 30 MIN: CPT | Performed by: FAMILY MEDICINE

## 2024-05-29 PROCEDURE — 3051F HG A1C>EQUAL 7.0%<8.0%: CPT | Performed by: FAMILY MEDICINE

## 2024-05-29 PROCEDURE — 1123F ACP DISCUSS/DSCN MKR DOCD: CPT | Performed by: FAMILY MEDICINE

## 2024-05-29 RX ORDER — TAMSULOSIN HYDROCHLORIDE 0.4 MG/1
0.4 CAPSULE ORAL 2 TIMES DAILY
COMMUNITY

## 2024-05-29 RX ORDER — CLOPIDOGREL BISULFATE 75 MG/1
75 TABLET ORAL DAILY
Qty: 90 TABLET | Refills: 1 | Status: SHIPPED | OUTPATIENT
Start: 2024-05-29 | End: 2024-06-05 | Stop reason: SDUPTHER

## 2024-05-29 RX ORDER — TRIAMCINOLONE ACETONIDE 1 MG/G
CREAM TOPICAL
Qty: 45 G | Refills: 1 | Status: SHIPPED | OUTPATIENT
Start: 2024-05-29 | End: 2024-06-05 | Stop reason: SDUPTHER

## 2024-05-29 NOTE — PROGRESS NOTES
Return in about 4 months (around 9/29/2024).      COMMUNICATION:     Disposition and Communication:     Electronically signed by Jesús Suárez MD on 5/29/2024 at 10:19 AM

## 2024-06-04 ENCOUNTER — TELEPHONE (OUTPATIENT)
Age: 74
End: 2024-06-04

## 2024-06-04 NOTE — TELEPHONE ENCOUNTER
Patient still has not received his scripts from his mail away when he was here on 5/29/24    He was supposed to get:    Something for his ears  Cream for his chest  And a refill on clopidogrel    Send to [facility] mail away

## 2024-06-05 RX ORDER — TRIAMCINOLONE ACETONIDE 1 MG/G
CREAM TOPICAL
Qty: 45 G | Refills: 1 | Status: SHIPPED | OUTPATIENT
Start: 2024-06-05

## 2024-06-05 RX ORDER — CLOPIDOGREL BISULFATE 75 MG/1
75 TABLET ORAL DAILY
Qty: 90 TABLET | Refills: 1 | Status: SHIPPED | OUTPATIENT
Start: 2024-06-05 | End: 2024-12-02

## 2024-06-05 NOTE — TELEPHONE ENCOUNTER
Rufino Armenta is calling to request a refill on the following medication(s):    Medication Request:  Requested Prescriptions     Pending Prescriptions Disp Refills    clopidogrel (PLAVIX) 75 MG tablet 90 tablet 1     Sig: Take 1 tablet by mouth daily    triamcinolone (KENALOG) 0.1 % cream 45 g 1     Sig: Apply topically 2 times daily. To itching area on chest    neomycin-polymyxin-hydrocortisone (CORTISPORIN) [id number] otic solution 10 mL 1     Sig: Place 4 drops into both ears 3 times daily Instill into  BILATERAL Ear       Last Visit Date (If Applicable):  5/29/2024    Next Visit Date:    9/4/2024

## 2024-06-10 RX ORDER — ALLOPURINOL 300 MG/1
300 TABLET ORAL DAILY
Qty: 90 TABLET | Refills: 3 | Status: SHIPPED | OUTPATIENT
Start: 2024-06-10

## 2024-06-10 ASSESSMENT — ENCOUNTER SYMPTOMS
CHEST TIGHTNESS: 0
SHORTNESS OF BREATH: 0

## 2024-06-10 NOTE — TELEPHONE ENCOUNTER
Rufino Armenta is calling to request a refill on the following medication(s):    Medication Request:  Requested Prescriptions     Pending Prescriptions Disp Refills    allopurinol (ZYLOPRIM) 300 MG tablet [Pharmacy Med Name: ALLOPURINOL 300 MG Tablet] 90 tablet 3     Sig: TAKE 1 TABLET EVERY DAY       Last Visit Date (If Applicable):  5/29/2024    Next Visit Date:    9/4/2024

## 2024-06-13 RX ORDER — HUMAN INSULIN 100 [USP'U]/ML
INJECTION, SUSPENSION SUBCUTANEOUS
Qty: 50 ML | Refills: 3 | Status: SHIPPED | OUTPATIENT
Start: 2024-06-13

## 2024-06-13 NOTE — TELEPHONE ENCOUNTER
Rufino Armenta is calling to request a refill on the following medication(s):    Medication Request:  Requested Prescriptions     Pending Prescriptions Disp Refills    NOVOLIN 70/30 (70-30) 100 UNIT/ML injection vial [Pharmacy Med Name: NOVOLIN 70/30 (70-30) 100 UNIT/ML Suspension] 50 mL 3     Sig: INJECT 25 UNITS INTO THE SKIN 2 TIMES DAILY (BEFORE MEALS)       Last Visit Date (If Applicable):  5/29/2024    Next Visit Date:    9/4/2024

## 2024-06-24 RX ORDER — TAMSULOSIN HYDROCHLORIDE 0.4 MG/1
0.4 CAPSULE ORAL 2 TIMES DAILY
Qty: 180 CAPSULE | Refills: 3 | Status: SHIPPED | OUTPATIENT
Start: 2024-06-24

## 2024-06-24 RX ORDER — IPRATROPIUM BROMIDE 42 UG/1
SPRAY, METERED NASAL
Qty: 105 ML | Refills: 3 | Status: SHIPPED | OUTPATIENT
Start: 2024-06-24

## 2024-06-24 NOTE — TELEPHONE ENCOUNTER
Rufino Armenta is calling to request a refill on the following medication(s):    Medication Request:  Requested Prescriptions     Pending Prescriptions Disp Refills    tamsulosin (FLOMAX) 0.4 MG capsule [Pharmacy Med Name: TAMSULOSIN HYDROCHLORIDE 0.4 MG Capsule] 180 capsule 3     Sig: TAKE 1 CAPSULE TWICE DAILY       Last Visit Date (If Applicable):  5/29/2024    Next Visit Date:    9/4/2024

## 2024-06-24 NOTE — TELEPHONE ENCOUNTER
Rufino Armenta is calling to request a refill on the following medication(s):    Medication Request:  Requested Prescriptions     Pending Prescriptions Disp Refills    ipratropium (ATROVENT) 0.06 % nasal spray [Pharmacy Med Name: IPRATROPIUM BROMIDE 0.06 % Solution] 105 mL 3     Sig: USE 2 SPRAYS IN EACH NOSTRIL THREE TIMES DAILY       Last Visit Date (If Applicable):  5/29/2024    Next Visit Date:    6/23/2024

## 2024-08-30 LAB
ALBUMIN: 4.7 G/DL (ref 3.5–5.2)
ALK PHOSPHATASE: 81 U/L (ref 40–125)
ALT SERPL-CCNC: 35 U/L (ref 5–50)
ANION GAP SERPL CALCULATED.3IONS-SCNC: 10 MEQ/L (ref 7–16)
AST SERPL-CCNC: 24 U/L (ref 9–50)
BASOPHILS ABSOLUTE: 0.03 K/UL (ref 0–0.2)
BASOPHILS RELATIVE PERCENT: 0.6 %
BILIRUB SERPL-MCNC: 0.9 MG/DL
BUN BLDV-MCNC: 23 MG/DL (ref 8–23)
CALCIUM SERPL-MCNC: 11.1 MG/DL (ref 8.5–10.5)
CHLORIDE BLD-SCNC: 103 MEQ/L (ref 95–107)
CHOLESTEROL, TOTAL: 152 MG/DL
CHOLESTEROL/HDL RATIO: 3.8 RATIO
CO2: 26 MEQ/L (ref 19–31)
CREAT SERPL-MCNC: 1.09 MG/DL (ref 0.8–1.4)
CREATINE, URINE: 21.8 MG/DL
EGFR IF NONAFRICAN AMERICAN: 72 ML/MIN/1.73
EOSINOPHILS ABSOLUTE: 0.1 K/UL (ref 0–0.5)
EOSINOPHILS RELATIVE PERCENT: 2.1 %
ESTIMATED AVERAGE GLUCOSE: 163 MG/DL
GLUCOSE: 160 MG/DL (ref 70–99)
HBA1C MFR BLD: 7.3 % (ref 4.2–5.6)
HCT VFR BLD CALC: 51.2 % (ref 40–51)
HDLC SERPL-MCNC: 40 MG/DL
HEMOGLOBIN: 16.4 G/DL (ref 13.5–17)
IMMATURE GRANS (ABS): 0.01
IMMATURE GRANULOCYTES %: 0.2 %
LDL CHOLESTEROL: 42 MG/DL
LDL/HDL RATIO: 1.1 RATIO
LYMPHOCYTES ABSOLUTE: 1.45 K/UL (ref 1–4)
LYMPHOCYTES RELATIVE PERCENT: 31 %
MCH RBC QN AUTO: 29.9 PG (ref 25–33)
MCHC RBC AUTO-ENTMCNC: 32 G/DL (ref 31–36)
MCV RBC AUTO: 93.4 FL (ref 80–99)
MICROALBUMIN/CREAT 24H UR: <1.2 MG/DL
MICROALBUMIN/CREAT UR-RTO: NORMAL MG/G
MONOCYTES ABSOLUTE: 0.43 K/UL (ref 0.2–1)
MONOCYTES RELATIVE PERCENT: 9.2 %
NEUTROPHILS ABSOLUTE: 2.65 K/UL (ref 1.5–7.5)
NEUTROPHILS RELATIVE PERCENT: 56.9 %
PDW BLD-RTO: 13.5 % (ref 11.5–15)
PLATELET # BLD: 163 K/UL (ref 130–400)
PMV BLD AUTO: 10.7 FL (ref 9.3–13)
POTASSIUM SERPL-SCNC: 4.4 MEQ/L (ref 3.5–5.4)
PSA, ULTRASENSITIVE: 0.84 NG/ML
RBC # BLD: 5.48 M/UL (ref 4.5–6.1)
SODIUM BLD-SCNC: 139 MEQ/L (ref 133–146)
TOTAL PROTEIN: 7.1 G/DL (ref 6.1–8.3)
TRIGL SERPL-MCNC: 351 MG/DL
VLDLC SERPL CALC-MCNC: 70 MG/DL
WBC # BLD: 4.7 K/UL (ref 3.5–11)

## 2024-09-03 SDOH — HEALTH STABILITY: PHYSICAL HEALTH: ON AVERAGE, HOW MANY MINUTES DO YOU ENGAGE IN EXERCISE AT THIS LEVEL?: 20 MIN

## 2024-09-03 SDOH — HEALTH STABILITY: PHYSICAL HEALTH: ON AVERAGE, HOW MANY DAYS PER WEEK DO YOU ENGAGE IN MODERATE TO STRENUOUS EXERCISE (LIKE A BRISK WALK)?: 3 DAYS

## 2024-09-03 ASSESSMENT — PATIENT HEALTH QUESTIONNAIRE - PHQ9
SUM OF ALL RESPONSES TO PHQ QUESTIONS 1-9: 0
SUM OF ALL RESPONSES TO PHQ9 QUESTIONS 1 & 2: 0
SUM OF ALL RESPONSES TO PHQ QUESTIONS 1-9: 0
2. FEELING DOWN, DEPRESSED OR HOPELESS: NOT AT ALL
1. LITTLE INTEREST OR PLEASURE IN DOING THINGS: NOT AT ALL

## 2024-09-03 ASSESSMENT — LIFESTYLE VARIABLES
HOW OFTEN DO YOU HAVE A DRINK CONTAINING ALCOHOL: 1
HOW MANY STANDARD DRINKS CONTAINING ALCOHOL DO YOU HAVE ON A TYPICAL DAY: PATIENT DOES NOT DRINK
HOW OFTEN DO YOU HAVE A DRINK CONTAINING ALCOHOL: NEVER
HOW MANY STANDARD DRINKS CONTAINING ALCOHOL DO YOU HAVE ON A TYPICAL DAY: 0
HOW OFTEN DO YOU HAVE SIX OR MORE DRINKS ON ONE OCCASION: 1

## 2024-09-04 ENCOUNTER — OFFICE VISIT (OUTPATIENT)
Age: 74
End: 2024-09-04
Payer: MEDICARE

## 2024-09-04 VITALS
WEIGHT: 183.8 LBS | DIASTOLIC BLOOD PRESSURE: 84 MMHG | HEIGHT: 63 IN | HEART RATE: 69 BPM | SYSTOLIC BLOOD PRESSURE: 130 MMHG | OXYGEN SATURATION: 100 % | BODY MASS INDEX: 32.57 KG/M2

## 2024-09-04 DIAGNOSIS — Z79.4 TYPE 2 DIABETES MELLITUS WITH DIABETIC POLYNEUROPATHY, WITH LONG-TERM CURRENT USE OF INSULIN (HCC): ICD-10-CM

## 2024-09-04 DIAGNOSIS — R07.89 CHEST WALL PAIN: ICD-10-CM

## 2024-09-04 DIAGNOSIS — E21.3 HYPERPARATHYROIDISM (HCC): ICD-10-CM

## 2024-09-04 DIAGNOSIS — Z00.00 INITIAL MEDICARE ANNUAL WELLNESS VISIT: Primary | ICD-10-CM

## 2024-09-04 DIAGNOSIS — E83.52 HYPERCALCEMIA: ICD-10-CM

## 2024-09-04 DIAGNOSIS — E11.42 TYPE 2 DIABETES MELLITUS WITH DIABETIC POLYNEUROPATHY, WITH LONG-TERM CURRENT USE OF INSULIN (HCC): ICD-10-CM

## 2024-09-04 LAB — PTH INTACT: 105 PG/ML (ref 15–65)

## 2024-09-04 PROCEDURE — 3075F SYST BP GE 130 - 139MM HG: CPT | Performed by: FAMILY MEDICINE

## 2024-09-04 PROCEDURE — 3017F COLORECTAL CA SCREEN DOC REV: CPT | Performed by: FAMILY MEDICINE

## 2024-09-04 PROCEDURE — 3051F HG A1C>EQUAL 7.0%<8.0%: CPT | Performed by: FAMILY MEDICINE

## 2024-09-04 PROCEDURE — 3079F DIAST BP 80-89 MM HG: CPT | Performed by: FAMILY MEDICINE

## 2024-09-04 PROCEDURE — G8427 DOCREV CUR MEDS BY ELIG CLIN: HCPCS | Performed by: FAMILY MEDICINE

## 2024-09-04 PROCEDURE — G0438 PPPS, INITIAL VISIT: HCPCS | Performed by: FAMILY MEDICINE

## 2024-09-04 PROCEDURE — 1123F ACP DISCUSS/DSCN MKR DOCD: CPT | Performed by: FAMILY MEDICINE

## 2024-09-04 PROCEDURE — 2022F DILAT RTA XM EVC RTNOPTHY: CPT | Performed by: FAMILY MEDICINE

## 2024-09-04 PROCEDURE — 1036F TOBACCO NON-USER: CPT | Performed by: FAMILY MEDICINE

## 2024-09-04 PROCEDURE — 99214 OFFICE O/P EST MOD 30 MIN: CPT | Performed by: FAMILY MEDICINE

## 2024-09-04 PROCEDURE — G8417 CALC BMI ABV UP PARAM F/U: HCPCS | Performed by: FAMILY MEDICINE

## 2024-09-04 RX ORDER — TRIAMCINOLONE ACETONIDE 1 MG/G
OINTMENT TOPICAL 2 TIMES DAILY
Qty: 45 G | Refills: 1 | Status: SHIPPED | OUTPATIENT
Start: 2024-09-04 | End: 2024-09-11

## 2024-09-04 ASSESSMENT — ENCOUNTER SYMPTOMS
CHEST TIGHTNESS: 0
SHORTNESS OF BREATH: 0

## 2024-09-04 NOTE — PATIENT INSTRUCTIONS

## 2024-09-04 NOTE — PROGRESS NOTES
normal.      Palpations: Abdomen is soft.   Musculoskeletal:         General: Normal range of motion.   Neurological:      Mental Status: He is alert and oriented to person, place, and time.   Psychiatric:         Mood and Affect: Mood normal.           ASSESSMENT/PLAN     1. Initial Medicare annual wellness visit  2. Chest wall pain  -     XR CHEST STANDARD (2 VW); Future  3. Type 2 diabetes mellitus with diabetic polyneuropathy, with long-term current use of insulin (HCC)  -     PTH, Intact; Future  -     Comprehensive Metabolic Panel; Future  -     CBC with Auto Differential; Future  -     Hemoglobin A1C; Future  4. Hypercalcemia  -     PTH, Intact; Future  -     Comprehensive Metabolic Panel; Future  -     CBC with Auto Differential; Future  -     Hemoglobin A1C; Future  5. Hyperparathyroidism (HCC)  -     PTH, Intact; Future  -     Comprehensive Metabolic Panel; Future  -     CBC with Auto Differential; Future  -     Hemoglobin A1C; Future     Hyperparathyroidism- will CONT to monitor labs- will discuss vitamin D at f/u APPT- check repeat labs in 3 months including diabetic labs  Orders Placed This Encounter   Medications    triamcinolone (KENALOG) 0.1 % ointment     Sig: Apply topically 2 times daily for 7 days     Dispense:  45 g     Refill:  1             No follow-ups on file.      COMMUNICATION:     Disposition and Communication:     Electronically signed by Jesús Suárez MD on 9/4/2024 at 12:16 PMMedicare Annual Wellness Visit    Rufino Armenta is here for Medicare AWV (Has a pain in his upper shoulder/chest area LT side. Dull pain. No SOB), Diabetes, and Blood Work    Assessment & Plan   Initial Medicare annual wellness visit  Chest wall pain  -     XR CHEST STANDARD (2 VW); Future  Type 2 diabetes mellitus with diabetic polyneuropathy, with long-term current use of insulin (HCC)  -     PTH, Intact; Future  -     Comprehensive Metabolic Panel; Future  -     CBC with Auto Differential; Future  -

## 2024-09-05 NOTE — TELEPHONE ENCOUNTER
Rufino Armenta is calling to request a refill on the following medication(s):    Medication Request:  Requested Prescriptions     Pending Prescriptions Disp Refills    gabapentin (NEURONTIN) 600 MG tablet [Pharmacy Med Name: Gabapentin Oral Tablet 600 MG] 270 tablet 3     Sig: Take 1 tablet by mouth 3 times daily.       Last Visit Date (If Applicable):  9/4/2024    Next Visit Date:    12/6/2024

## 2024-09-06 RX ORDER — GABAPENTIN 600 MG/1
600 TABLET ORAL 3 TIMES DAILY
Qty: 270 TABLET | Refills: 3 | Status: SHIPPED | OUTPATIENT
Start: 2024-09-06 | End: 2025-09-01

## 2024-10-10 NOTE — TELEPHONE ENCOUNTER
Rufino Armenta is calling to request a refill on the following medication(s):    Medication Request:  Requested Prescriptions     Pending Prescriptions Disp Refills    losartan (COZAAR) 25 MG tablet [Pharmacy Med Name: Losartan Potassium Oral Tablet 25 MG] 90 tablet 3     Sig: TAKE 1 TABLET EVERY DAY    Insulin Syringe-Needle U-100 (DROPLET INSULIN SYRINGE) 31G X 15/64\" 0.5 ML MISC [Pharmacy Med Name: Droplet Insulin Syringe Miscellaneous 31G X 15/64\" 0.5 ML] 200 each 3     Sig: USE AS DIRECTED WITH INSULIN TWICE DAILY       Last Visit Date (If Applicable):  9/4/2024    Next Visit Date:    12/6/2024

## 2024-10-11 RX ORDER — LOSARTAN POTASSIUM 25 MG/1
25 TABLET ORAL DAILY
Qty: 90 TABLET | Refills: 3 | Status: SHIPPED | OUTPATIENT
Start: 2024-10-11

## 2024-10-11 RX ORDER — SYRGE-NDL,INS 0.5 ML HALF MARK 31GX15/64"
SYRINGE, EMPTY DISPOSABLE MISCELLANEOUS
Qty: 200 EACH | Refills: 3 | Status: SHIPPED | OUTPATIENT
Start: 2024-10-11

## 2024-11-18 RX ORDER — LANCETS
EACH MISCELLANEOUS
Qty: 200 EACH | Refills: 3 | Status: SHIPPED | OUTPATIENT
Start: 2024-11-18

## 2024-11-18 RX ORDER — ATORVASTATIN CALCIUM 40 MG/1
40 TABLET, FILM COATED ORAL
Qty: 90 TABLET | Refills: 3 | Status: SHIPPED | OUTPATIENT
Start: 2024-11-18

## 2024-11-18 RX ORDER — ISOSORBIDE MONONITRATE 60 MG/1
60 TABLET, EXTENDED RELEASE ORAL EVERY MORNING
Qty: 90 TABLET | Refills: 3 | Status: SHIPPED | OUTPATIENT
Start: 2024-11-18

## 2024-11-18 NOTE — TELEPHONE ENCOUNTER
Rufino Armenta is calling to request a refill on the following medication(s):    Medication Request:  Requested Prescriptions     Pending Prescriptions Disp Refills    atorvastatin (LIPITOR) 40 MG tablet [Pharmacy Med Name: Atorvastatin Calcium Oral Tablet 40 MG] 90 tablet 3     Sig: TAKE 1 TABLET AT BEDTIME    isosorbide mononitrate (IMDUR) 60 MG extended release tablet [Pharmacy Med Name: Isosorbide Mononitrate ER Oral Tablet Extended Release 24 Hour 60 MG] 90 tablet 3     Sig: TAKE 1 TABLET EVERY MORNING    Accu-Chek Softclix Lancets MISC [Pharmacy Med Name: Accu-Chek Softclix Lancets Miscellaneous] 200 each 3     Sig: TEST BLOOD SUGAR TWO TIMES DAILY AS DIRECTED FOR DIABETES       Last Visit Date (If Applicable):  9/4/2024    Next Visit Date:    12/6/2024

## 2024-11-22 RX ORDER — CLOPIDOGREL BISULFATE 75 MG/1
75 TABLET ORAL DAILY
Qty: 90 TABLET | Refills: 3 | Status: SHIPPED | OUTPATIENT
Start: 2024-11-22

## 2024-11-22 NOTE — TELEPHONE ENCOUNTER
Rufino Armenta is calling to request a refill on the following medication(s):    Medication Request:  Requested Prescriptions     Pending Prescriptions Disp Refills    clopidogrel (PLAVIX) 75 MG tablet [Pharmacy Med Name: Clopidogrel Bisulfate Oral Tablet 75 MG] 90 tablet 3     Sig: TAKE 1 TABLET EVERY DAY       Last Visit Date (If Applicable):  9/4/2024    Next Visit Date:    12/6/2024

## 2024-11-29 NOTE — TELEPHONE ENCOUNTER
Rufino Armenta is calling to request a refill on the following medication(s):    Medication Request:  Requested Prescriptions     Pending Prescriptions Disp Refills    triamcinolone (KENALOG) 0.1 % cream [Pharmacy Med Name: Triamcinolone Acetonide External Cream 0.1 %] 45 g 3     Sig: APPLY TOPICALLY TO ITCHING AREA ON CHEST 2 TIMES DAILY.       Last Visit Date (If Applicable):  9/4/2024    Next Visit Date:    12/6/2024

## 2024-11-30 DIAGNOSIS — E11.42 TYPE 2 DIABETES MELLITUS WITH DIABETIC POLYNEUROPATHY, WITH LONG-TERM CURRENT USE OF INSULIN (HCC): Primary | ICD-10-CM

## 2024-11-30 DIAGNOSIS — Z79.4 TYPE 2 DIABETES MELLITUS WITH DIABETIC POLYNEUROPATHY, WITH LONG-TERM CURRENT USE OF INSULIN (HCC): Primary | ICD-10-CM

## 2024-11-30 RX ORDER — TRIAMCINOLONE ACETONIDE 1 MG/G
CREAM TOPICAL
Qty: 45 G | Refills: 3 | Status: SHIPPED | OUTPATIENT
Start: 2024-11-30

## 2024-12-02 RX ORDER — GLUCOSAM/CHON-MSM1/C/MANG/BOSW 500-416.6
TABLET ORAL
Qty: 400 EACH | Refills: 3 | Status: SHIPPED | OUTPATIENT
Start: 2024-12-02

## 2024-12-02 RX ORDER — CALCIUM CITRATE/VITAMIN D3 200MG-6.25
TABLET ORAL
Qty: 400 STRIP | Refills: 3 | Status: SHIPPED | OUTPATIENT
Start: 2024-12-02

## 2024-12-03 LAB
ALBUMIN: 4.4 G/DL (ref 3.5–5.2)
ALK PHOSPHATASE: 72 U/L (ref 39–118)
ALT SERPL-CCNC: 40 U/L (ref 5–41)
ANION GAP SERPL CALCULATED.3IONS-SCNC: 11 MMOL/L (ref 7–16)
AST SERPL-CCNC: 28 U/L (ref 9–50)
BASOPHILS ABSOLUTE: 0.03 K/UL (ref 0–0.2)
BASOPHILS RELATIVE PERCENT: 0.7 % (ref 0–2)
BILIRUB SERPL-MCNC: 1.2 MG/DL
BUN BLDV-MCNC: 23 MG/DL (ref 8–23)
CALCIUM SERPL-MCNC: 10.9 MG/DL (ref 8.6–10.5)
CHLORIDE BLD-SCNC: 103 MMOL/L (ref 96–107)
CO2: 27 MMOL/L (ref 18–32)
CREAT SERPL-MCNC: 1.17 MG/DL (ref 0.67–1.3)
EGFR IF NONAFRICAN AMERICAN: 66 ML/MIN/1.73M2
EOSINOPHILS ABSOLUTE: 0.09 K/UL (ref 0–0.8)
EOSINOPHILS RELATIVE PERCENT: 2 % (ref 0–5)
ESTIMATED AVERAGE GLUCOSE: 169 MG/DL
GLUCOSE: 158 MG/DL (ref 70–100)
HBA1C MFR BLD: 7.5 %
HCT VFR BLD CALC: 50.5 % (ref 39–52)
HEMOGLOBIN: 16.7 G/DL (ref 13–18)
IMMATURE GRANS (ABS): 0.03 K/UL (ref 0–0.06)
IMMATURE GRANULOCYTES %: 0.7 % (ref 0–2)
LYMPHOCYTES ABSOLUTE: 1.43 K/UL (ref 0.9–5.2)
LYMPHOCYTES RELATIVE PERCENT: 31.8 % (ref 20–45)
MCH RBC QN AUTO: 30.1 PG (ref 26–32)
MCHC RBC AUTO-ENTMCNC: 33.1 G/DL (ref 32–35)
MCV RBC AUTO: 91 FL (ref 75–100)
MONOCYTES ABSOLUTE: 0.43 K/UL (ref 0.1–1)
MONOCYTES RELATIVE PERCENT: 9.6 % (ref 0–13)
NEUTROPHILS ABSOLUTE: 2.48 K/UL (ref 1.9–8)
NEUTROPHILS RELATIVE PERCENT: 55.2 % (ref 45–75)
PDW BLD-RTO: 13 % (ref 11.2–14.8)
PLATELET # BLD: 149 THOUS/CMM (ref 140–440)
POTASSIUM SERPL-SCNC: 4.7 MMOL/L (ref 3.5–5.4)
PTH INTACT: 108 PG/ML (ref 12–65)
RBC # BLD: 5.55 MILL/CMM (ref 4.4–6.1)
SODIUM BLD-SCNC: 141 MMOL/L (ref 135–148)
TOTAL PROTEIN: 6.7 G/DL (ref 6–8.3)
WBC # BLD: 4.5 THDS/CMM (ref 3.6–11)

## 2024-12-06 ENCOUNTER — OFFICE VISIT (OUTPATIENT)
Age: 74
End: 2024-12-06

## 2024-12-06 VITALS
SYSTOLIC BLOOD PRESSURE: 130 MMHG | HEART RATE: 64 BPM | WEIGHT: 187 LBS | BODY MASS INDEX: 33.13 KG/M2 | HEIGHT: 63 IN | TEMPERATURE: 97.8 F | DIASTOLIC BLOOD PRESSURE: 86 MMHG | OXYGEN SATURATION: 95 %

## 2024-12-06 DIAGNOSIS — Z85.46 HISTORY OF PROSTATE CANCER: ICD-10-CM

## 2024-12-06 DIAGNOSIS — I10 PRIMARY HYPERTENSION: ICD-10-CM

## 2024-12-06 DIAGNOSIS — E21.3 HYPERPARATHYROIDISM (HCC): ICD-10-CM

## 2024-12-06 DIAGNOSIS — L97.512 DIABETIC ULCER OF RIGHT FOOT ASSOCIATED WITH TYPE 2 DIABETES MELLITUS, WITH FAT LAYER EXPOSED, UNSPECIFIED PART OF FOOT (HCC): ICD-10-CM

## 2024-12-06 DIAGNOSIS — L98.9 SKIN LESION OF BACK: ICD-10-CM

## 2024-12-06 DIAGNOSIS — Z23 ENCOUNTER FOR IMMUNIZATION: Primary | ICD-10-CM

## 2024-12-06 DIAGNOSIS — E11.621 DIABETIC ULCER OF RIGHT FOOT ASSOCIATED WITH TYPE 2 DIABETES MELLITUS, WITH FAT LAYER EXPOSED, UNSPECIFIED PART OF FOOT (HCC): ICD-10-CM

## 2024-12-06 DIAGNOSIS — E11.42 TYPE 2 DIABETES MELLITUS WITH DIABETIC POLYNEUROPATHY, WITH LONG-TERM CURRENT USE OF INSULIN (HCC): ICD-10-CM

## 2024-12-06 DIAGNOSIS — Z79.4 TYPE 2 DIABETES MELLITUS WITH DIABETIC POLYNEUROPATHY, WITH LONG-TERM CURRENT USE OF INSULIN (HCC): ICD-10-CM

## 2024-12-06 NOTE — PROGRESS NOTES
[facility]  [facility] MEDICINE  [address]  [facility] [address]  Dept: [phone]     Date of Visit:  2024  Patient Name: Rufino Armenta   Patient :  1950     CHIEF COMPLAINT/HPI:     Rufino Armenta is a 74 y.o. male who presents today for an general visit to be evaluated for the following condition(s):  Chief Complaint   Patient presents with    Diabetes     He is here for his 3 month check up with labs done on 2024.       REVIEW OF SYSTEM      Review of Systems   Respiratory:  Negative for chest tightness and shortness of breath.    Cardiovascular:  Negative for chest pain.         REVIEWED INFORMATION      Allergies   Allergen Reactions    Doxycycline     Lisinopril        Current Outpatient Medications   Medication Sig Dispense Refill    TRUE METRIX BLOOD GLUCOSE TEST strip TEST BLOOD SUGAR FOUR TIMES DAILY AS NEEDED 400 strip 3    TRUEplus Lancets 33G MISC TEST BLOOD SUGAR 4 TIMES DAILY AS NEEDED 400 each 3    triamcinolone (KENALOG) 0.1 % cream APPLY TOPICALLY TO ITCHING AREA ON CHEST 2 TIMES DAILY. 45 g 3    clopidogrel (PLAVIX) 75 MG tablet TAKE 1 TABLET EVERY DAY 90 tablet 3    atorvastatin (LIPITOR) 40 MG tablet TAKE 1 TABLET AT BEDTIME 90 tablet 3    isosorbide mononitrate (IMDUR) 60 MG extended release tablet TAKE 1 TABLET EVERY MORNING 90 tablet 3    Accu-Chek Softclix Lancets MISC TEST BLOOD SUGAR TWO TIMES DAILY AS DIRECTED FOR DIABETES 200 each 3    losartan (COZAAR) 25 MG tablet TAKE 1 TABLET EVERY DAY 90 tablet 3    Insulin Syringe-Needle U-100 (DROPLET INSULIN SYRINGE) 31G X 15/64\" 0.5 ML MISC USE AS DIRECTED WITH INSULIN TWICE DAILY 200 each 3    gabapentin (NEURONTIN) 600 MG tablet Take 1 tablet by mouth 3 times daily for 360 days. 270 tablet 3    ipratropium (ATROVENT) 0.06 % nasal spray USE 2 SPRAYS IN EACH NOSTRIL THREE TIMES DAILY 105 mL 3    tamsulosin (FLOMAX) 0.4 MG capsule TAKE 1 CAPSULE TWICE DAILY 180 capsule 3

## 2024-12-06 NOTE — PATIENT INSTRUCTIONS
Rufino    Thank you for choosing [facility].  We know you have options when it comes to your healthcare; we appreciate that you chose us. Our goal is to provide exceptional  service and world class care to every patient.  You will be receiving a survey via email or text message asking for your feedback.  Please take a few minutes to share your thoughts about your recent visit. Your comments help us understand what we do well and ways we can improve.  Thank you in advance for your valuable feedback.      Dr. LORENE Deleon

## 2024-12-06 NOTE — PROGRESS NOTES
Vaccine Information Sheet, \"Influenza - Inactivated\"  given to Rufino Armenta, or parent/legal guardian of  Rufino Armenta and verbalized understanding.    Patient responses:    Have you ever had a reaction to a flu vaccine? No  Are you able to eat eggs without adverse effects?  Yes  Do you have any current illness?  No  Have you ever had Guillian [place] Syndrome?  No    Flu vaccine given per order. Please see immunization tab.    Left Deltoid  VIS was given

## 2024-12-14 ASSESSMENT — ENCOUNTER SYMPTOMS
SHORTNESS OF BREATH: 0
CHEST TIGHTNESS: 0

## 2025-01-31 RX ORDER — METOPROLOL SUCCINATE 50 MG/1
50 TABLET, EXTENDED RELEASE ORAL DAILY
Qty: 90 TABLET | Refills: 3 | Status: SHIPPED | OUTPATIENT
Start: 2025-01-31

## 2025-01-31 NOTE — TELEPHONE ENCOUNTER
Rufino Armenta is calling to request a refill on the following medication(s):    Medication Request:  Requested Prescriptions     Pending Prescriptions Disp Refills    metoprolol succinate (TOPROL XL) 50 MG extended release tablet [Pharmacy Med Name: Metoprolol Succinate ER Oral Tablet Extended Release 24 Hour 50 MG] 90 tablet 3     Sig: TAKE 1 TABLET EVERY DAY       Last Visit Date (If Applicable):  12/6/2024    Next Visit Date:    3/19/2025

## 2025-03-05 ENCOUNTER — HOSPITAL ENCOUNTER (EMERGENCY)
Age: 75
Discharge: HOME | End: 2025-03-05
Attending: EMERGENCY MEDICINE
Payer: MEDICARE

## 2025-03-05 ENCOUNTER — APPOINTMENT (OUTPATIENT)
Dept: GENERAL RADIOLOGY | Age: 75
End: 2025-03-05
Payer: MEDICARE

## 2025-03-05 VITALS
HEIGHT: 63 IN | BODY MASS INDEX: 32.78 KG/M2 | WEIGHT: 185 LBS | HEART RATE: 77 BPM | TEMPERATURE: 97.7 F | DIASTOLIC BLOOD PRESSURE: 87 MMHG | SYSTOLIC BLOOD PRESSURE: 138 MMHG | RESPIRATION RATE: 16 BRPM | OXYGEN SATURATION: 97 %

## 2025-03-05 DIAGNOSIS — S61.001A AVULSION OF SKIN OF RIGHT THUMB, INITIAL ENCOUNTER: Primary | ICD-10-CM

## 2025-03-05 PROCEDURE — 90715 TDAP VACCINE 7 YRS/> IM: CPT

## 2025-03-05 PROCEDURE — 12002 RPR S/N/AX/GEN/TRNK2.6-7.5CM: CPT

## 2025-03-05 PROCEDURE — 6360000002 HC RX W HCPCS

## 2025-03-05 PROCEDURE — 90471 IMMUNIZATION ADMIN: CPT

## 2025-03-05 PROCEDURE — 73130 X-RAY EXAM OF HAND: CPT

## 2025-03-05 PROCEDURE — 6370000000 HC RX 637 (ALT 250 FOR IP)

## 2025-03-05 PROCEDURE — 99284 EMERGENCY DEPT VISIT MOD MDM: CPT

## 2025-03-05 RX ORDER — CEPHALEXIN 500 MG/1
500 CAPSULE ORAL 3 TIMES DAILY
Qty: 21 CAPSULE | Refills: 0 | Status: SHIPPED | OUTPATIENT
Start: 2025-03-05 | End: 2025-03-12

## 2025-03-05 RX ORDER — BUPIVACAINE HYDROCHLORIDE 2.5 MG/ML
30 INJECTION, SOLUTION EPIDURAL; INFILTRATION; INTRACAUDAL ONCE
Status: COMPLETED | OUTPATIENT
Start: 2025-03-05 | End: 2025-03-05

## 2025-03-05 RX ADMIN — BUPIVACAINE HYDROCHLORIDE 75 MG: 2.5 INJECTION, SOLUTION EPIDURAL; INFILTRATION; INTRACAUDAL; PERINEURAL at 15:20

## 2025-03-05 RX ADMIN — TETANUS TOXOID, REDUCED DIPHTHERIA TOXOID AND ACELLULAR PERTUSSIS VACCINE, ADSORBED 0.5 ML: 5; 2.5; 8; 8; 2.5 SUSPENSION INTRAMUSCULAR at 13:46

## 2025-03-05 RX ADMIN — LIDOCAINE HYDROCHLORIDE 20 ML: 10 INJECTION, SOLUTION EPIDURAL; INFILTRATION; INTRACAUDAL; PERINEURAL at 15:20

## 2025-03-05 RX ADMIN — CEPHALEXIN 500 MG: 250 CAPSULE ORAL at 15:19

## 2025-03-05 ASSESSMENT — PAIN SCALES - GENERAL: PAINLEVEL_OUTOF10: 2

## 2025-03-05 ASSESSMENT — PAIN - FUNCTIONAL ASSESSMENT: PAIN_FUNCTIONAL_ASSESSMENT: 0-10

## 2025-03-05 NOTE — ED PROVIDER NOTES
[facility] Emergency Department    [address]  [place] [address]  Phone: [phone]  Fax: [phone]  Emergency Department  Faculty Attestation    I performed a history and physical examination of the patient and discussed management with the mid level provider. I reviewed the mid level provider's note and agree with the documented findings and plan of care. Any areas of disagreement are noted on the chart. I was personally present for the key portions of any procedures. I have documented in the chart those procedures where I was not present during the key portions. I have reviewed the emergency nurses triage note. I agree with the chief complaint, past medical history, past surgical history, allergies, medications, social and family history as documented unless otherwise noted below. Documentation of the HPI, Physical Exam and Medical Decision Making performed by medical students or scribes is based on my personal performance of the HPI, PE and MDM. For Physician Assistant/ Nurse Practitioner cases/documentation I have personally evaluated this patient and have completed at least one if not all key elements of the E/M (history, physical exam, and MDM). Additional findings are as noted.      Primary Care Physician:  Jesús Suárez MD    CHIEF COMPLAINT       Chief Complaint   Patient presents with    Laceration     Around noon, making picture frames, and cut thumb on a table saw- R thumb laceration- pt is on plavix       RECENT VITALS:   Temp: 97.7 °F (36.5 °C),  Pulse: 77, Respirations: 16, BP: 138/87    LABS:  Labs Reviewed - No data to display        XR HAND RIGHT (MIN 3 VIEWS) (Final result)  Result time 03/05/25 14:28:25  Final result by Rufino Valencia MD (03/05/25 14:28:25)                Impression:    Laceration at the tip of the thumb a with no fracture or metallic radiopaque  foreign body.  A faint 4 mm opacity in the soft tissues at the site of injury  is noted and could 
NP  03/05/25 1508

## 2025-03-06 RX ORDER — FUROSEMIDE 40 MG/1
40 TABLET ORAL DAILY
Qty: 90 TABLET | Refills: 3 | Status: SHIPPED | OUTPATIENT
Start: 2025-03-06

## 2025-03-06 NOTE — TELEPHONE ENCOUNTER
Rufino Armenta is calling to request a refill on the following medication(s):    Medication Request:  Requested Prescriptions     Pending Prescriptions Disp Refills    furosemide (LASIX) 40 MG tablet [Pharmacy Med Name: Furosemide Oral Tablet 40 MG] 90 tablet 3     Sig: TAKE 1 TABLET EVERY DAY       Last Visit Date (If Applicable):  12/6/2024    Next Visit Date:    3/19/2025

## 2025-03-07 ENCOUNTER — OFFICE VISIT (OUTPATIENT)
Age: 75
End: 2025-03-07

## 2025-03-07 VITALS
OXYGEN SATURATION: 95 % | WEIGHT: 189.6 LBS | TEMPERATURE: 97.6 F | BODY MASS INDEX: 33.59 KG/M2 | SYSTOLIC BLOOD PRESSURE: 132 MMHG | DIASTOLIC BLOOD PRESSURE: 84 MMHG | HEART RATE: 68 BPM

## 2025-03-07 DIAGNOSIS — S61.111D LACERATION OF RIGHT THUMB WITHOUT FOREIGN BODY WITH DAMAGE TO NAIL, SUBSEQUENT ENCOUNTER: Primary | ICD-10-CM

## 2025-03-07 SDOH — ECONOMIC STABILITY: FOOD INSECURITY: WITHIN THE PAST 12 MONTHS, YOU WORRIED THAT YOUR FOOD WOULD RUN OUT BEFORE YOU GOT MONEY TO BUY MORE.: NEVER TRUE

## 2025-03-07 SDOH — ECONOMIC STABILITY: FOOD INSECURITY: WITHIN THE PAST 12 MONTHS, THE FOOD YOU BOUGHT JUST DIDN'T LAST AND YOU DIDN'T HAVE MONEY TO GET MORE.: NEVER TRUE

## 2025-03-07 ASSESSMENT — ENCOUNTER SYMPTOMS
CHEST TIGHTNESS: 0
SHORTNESS OF BREATH: 0

## 2025-03-07 ASSESSMENT — PATIENT HEALTH QUESTIONNAIRE - PHQ9
SUM OF ALL RESPONSES TO PHQ QUESTIONS 1-9: 0
2. FEELING DOWN, DEPRESSED OR HOPELESS: NOT AT ALL
1. LITTLE INTEREST OR PLEASURE IN DOING THINGS: NOT AT ALL

## 2025-03-07 NOTE — PROGRESS NOTES
[facility]  [facility] MEDICINE  [address]  [place] [address]  Dept: [phone]     Date of Visit:  3/7/2025  Patient Name: Rufino Armenta   Patient :  1950     CHIEF COMPLAINT/HPI:     Rufino Armenta is a 74 y.o. male who presents today for an general visit to be evaluated for the following condition(s):  Chief Complaint   Patient presents with    Follow-up     Right thumb - cut on table saw on Wednesday 9 stitches     Got tetanus shot in ED.  REVIEW OF SYSTEM      Review of Systems   Respiratory:  Negative for chest tightness and shortness of breath.    Cardiovascular:  Negative for chest pain.         REVIEWED INFORMATION      Allergies   Allergen Reactions    Doxycycline     Lisinopril        Current Outpatient Medications   Medication Sig Dispense Refill    furosemide (LASIX) 40 MG tablet TAKE 1 TABLET EVERY DAY 90 tablet 3    cephALEXin (KEFLEX) 500 MG capsule Take 1 capsule by mouth 3 times daily for 7 days 21 capsule 0    metoprolol succinate (TOPROL XL) 50 MG extended release tablet TAKE 1 TABLET EVERY DAY 90 tablet 3    TRUE METRIX BLOOD GLUCOSE TEST strip TEST BLOOD SUGAR FOUR TIMES DAILY AS NEEDED 400 strip 3    TRUEplus Lancets 33G MISC TEST BLOOD SUGAR 4 TIMES DAILY AS NEEDED 400 each 3    clopidogrel (PLAVIX) 75 MG tablet TAKE 1 TABLET EVERY DAY 90 tablet 3    atorvastatin (LIPITOR) 40 MG tablet TAKE 1 TABLET AT BEDTIME 90 tablet 3    isosorbide mononitrate (IMDUR) 60 MG extended release tablet TAKE 1 TABLET EVERY MORNING 90 tablet 3    Accu-Chek Softclix Lancets MISC TEST BLOOD SUGAR TWO TIMES DAILY AS DIRECTED FOR DIABETES 200 each 3    losartan (COZAAR) 25 MG tablet TAKE 1 TABLET EVERY DAY 90 tablet 3    Insulin Syringe-Needle U-100 (DROPLET INSULIN SYRINGE) 31G X 15/64\" 0.5 ML MISC USE AS DIRECTED WITH INSULIN TWICE DAILY 200 each 3    gabapentin (NEURONTIN) 600 MG tablet Take 1 tablet by mouth 3 times daily for 360 days. 270

## 2025-03-14 LAB
ALBUMIN: 4.5 G/DL (ref 3.5–5.2)
ALK PHOSPHATASE: 69 U/L (ref 39–118)
ALT SERPL-CCNC: 51 U/L (ref 5–41)
ANION GAP SERPL CALCULATED.3IONS-SCNC: 12 MMOL/L (ref 7–16)
AST SERPL-CCNC: 28 U/L (ref 9–50)
BASOPHILS ABSOLUTE: 0.03 K/UL (ref 0–0.2)
BASOPHILS RELATIVE PERCENT: 0.6 % (ref 0–2)
BILIRUB SERPL-MCNC: 1.2 MG/DL
BUN BLDV-MCNC: 26 MG/DL (ref 8–23)
CALCIUM SERPL-MCNC: 11 MG/DL (ref 8.6–10.5)
CHLORIDE BLD-SCNC: 102 MMOL/L (ref 96–107)
CO2: 27 MMOL/L (ref 18–32)
CREAT SERPL-MCNC: 1.18 MG/DL (ref 0.67–1.3)
EGFR IF NONAFRICAN AMERICAN: 65 ML/MIN/1.73M2
EOSINOPHILS ABSOLUTE: 0.09 K/UL (ref 0–0.8)
EOSINOPHILS RELATIVE PERCENT: 1.8 % (ref 0–5)
ESTIMATED AVERAGE GLUCOSE: 183 MG/DL
GLUCOSE: 195 MG/DL (ref 70–100)
HBA1C MFR BLD: 8 %
HCT VFR BLD CALC: 49.3 % (ref 39–52)
HEMOGLOBIN: 16.3 G/DL (ref 13–18)
IMMATURE GRANS (ABS): 0.01 K/UL (ref 0–0.06)
IMMATURE GRANULOCYTES %: 0.2 % (ref 0–2)
LYMPHOCYTES ABSOLUTE: 1.52 K/UL (ref 0.9–5.2)
LYMPHOCYTES RELATIVE PERCENT: 30.3 % (ref 20–45)
MCH RBC QN AUTO: 30 PG (ref 26–32)
MCHC RBC AUTO-ENTMCNC: 33.1 G/DL (ref 32–35)
MCV RBC AUTO: 91 FL (ref 75–100)
MICROALBUMIN/CREAT 24H UR: 24 MG/L
MONOCYTES ABSOLUTE: 0.45 K/UL (ref 0.1–1)
MONOCYTES RELATIVE PERCENT: 9 % (ref 0–13)
NEUTROPHILS ABSOLUTE: 2.92 K/UL (ref 1.9–8)
NEUTROPHILS RELATIVE PERCENT: 58.1 % (ref 45–75)
PDW BLD-RTO: 13.3 % (ref 11.2–14.8)
PLATELET # BLD: 157 THOUS/CMM (ref 140–440)
POTASSIUM SERPL-SCNC: 4.3 MMOL/L (ref 3.5–5.4)
RBC # BLD: 5.43 MILL/CMM (ref 4.4–6.1)
SODIUM BLD-SCNC: 141 MMOL/L (ref 135–148)
TOTAL PROTEIN: 6.5 G/DL (ref 6–8.3)
WBC # BLD: 5 THDS/CMM (ref 3.6–11)

## 2025-03-19 ENCOUNTER — OFFICE VISIT (OUTPATIENT)
Age: 75
End: 2025-03-19

## 2025-03-19 VITALS
DIASTOLIC BLOOD PRESSURE: 82 MMHG | BODY MASS INDEX: 33.49 KG/M2 | HEIGHT: 63 IN | WEIGHT: 189 LBS | HEART RATE: 71 BPM | OXYGEN SATURATION: 97 % | SYSTOLIC BLOOD PRESSURE: 134 MMHG

## 2025-03-19 DIAGNOSIS — Z79.4 TYPE 2 DIABETES MELLITUS WITH DIABETIC POLYNEUROPATHY, WITH LONG-TERM CURRENT USE OF INSULIN (HCC): Primary | ICD-10-CM

## 2025-03-19 DIAGNOSIS — E11.42 TYPE 2 DIABETES MELLITUS WITH DIABETIC POLYNEUROPATHY, WITH LONG-TERM CURRENT USE OF INSULIN (HCC): Primary | ICD-10-CM

## 2025-03-19 DIAGNOSIS — I48.0 PAROXYSMAL ATRIAL FIBRILLATION (HCC): ICD-10-CM

## 2025-03-19 NOTE — PROGRESS NOTES
ASSESSMENT/PLAN     1. Type 2 diabetes mellitus with diabetic polyneuropathy, with long-term current use of insulin (HCC)  -     Comprehensive Metabolic Panel; Future  -     CBC with Auto Differential; Future  -     Hemoglobin A1C; Future  -     Lipid Panel; Future  2. Paroxysmal atrial fibrillation (HCC)  -     Comprehensive Metabolic Panel; Future  -     CBC with Auto Differential; Future  -     Hemoglobin A1C; Future  -     Lipid Panel; Future     DMII- A1C a bit elevated 8.0- patient more active in summer and prefers to see if can be controlled with additional exercise- CONT insulin 70/30  HTN/HLD_ controlled CONT statin and current BP meds  Finger lacertaion- sutures removed without difficulty  No orders of the defined types were placed in this encounter.            No follow-ups on file.      COMMUNICATION:     Disposition and Communication:     Electronically signed by Jesús Suárez MD on 3/21/2025 at 2:56 PM

## 2025-03-21 ASSESSMENT — ENCOUNTER SYMPTOMS
CHEST TIGHTNESS: 0
SHORTNESS OF BREATH: 0

## 2025-03-31 RX ORDER — ALLOPURINOL 300 MG/1
300 TABLET ORAL DAILY
Qty: 90 TABLET | Refills: 3 | Status: SHIPPED | OUTPATIENT
Start: 2025-03-31

## 2025-03-31 NOTE — TELEPHONE ENCOUNTER
Rufino Armenta is calling to request a refill on the following medication(s):    Medication Request:  Requested Prescriptions     Pending Prescriptions Disp Refills    allopurinol (ZYLOPRIM) 300 MG tablet [Pharmacy Med Name: Allopurinol Oral Tablet 300 MG] 90 tablet 3     Sig: TAKE 1 TABLET EVERY DAY       Last Visit Date (If Applicable):  3/19/2025    Next Visit Date:    7/22/2025

## 2025-04-14 ENCOUNTER — TRANSCRIBE ORDERS (OUTPATIENT)
Dept: ADMINISTRATIVE | Age: 75
End: 2025-04-14

## 2025-04-14 DIAGNOSIS — R97.21 RISING PSA FOLLOWING TREATMENT FOR MALIGNANT NEOPLASM OF PROSTATE: Primary | ICD-10-CM

## 2025-04-14 NOTE — TELEPHONE ENCOUNTER
Rufino Armenta is calling to request a refill on the following medication(s):    Medication Request:  Requested Prescriptions     Pending Prescriptions Disp Refills    tamsulosin (FLOMAX) 0.4 MG capsule [Pharmacy Med Name: Tamsulosin HCl Oral Capsule 0.4 MG] 180 capsule 3     Sig: TAKE 1 CAPSULE TWICE DAILY       Last Visit Date (If Applicable):  3/19/2025    Next Visit Date:    7/22/2025

## 2025-04-15 RX ORDER — TAMSULOSIN HYDROCHLORIDE 0.4 MG/1
0.4 CAPSULE ORAL 2 TIMES DAILY
Qty: 180 CAPSULE | Refills: 3 | Status: SHIPPED | OUTPATIENT
Start: 2025-04-15

## 2025-04-18 ENCOUNTER — HOSPITAL ENCOUNTER (OUTPATIENT)
Age: 75
Discharge: HOME | End: 2025-04-18
Attending: UROLOGY
Payer: MEDICARE

## 2025-04-18 ENCOUNTER — HOSPITAL ENCOUNTER (OUTPATIENT)
Dept: NUCLEAR MEDICINE | Age: 75
Discharge: HOME | End: 2025-04-20
Attending: UROLOGY
Payer: MEDICARE

## 2025-04-18 DIAGNOSIS — I48.0 PAROXYSMAL ATRIAL FIBRILLATION (HCC): ICD-10-CM

## 2025-04-18 DIAGNOSIS — E11.42 TYPE 2 DIABETES MELLITUS WITH DIABETIC POLYNEUROPATHY, WITH LONG-TERM CURRENT USE OF INSULIN (HCC): ICD-10-CM

## 2025-04-18 DIAGNOSIS — R97.21 RISING PSA FOLLOWING TREATMENT FOR MALIGNANT NEOPLASM OF PROSTATE: ICD-10-CM

## 2025-04-18 DIAGNOSIS — Z79.4 TYPE 2 DIABETES MELLITUS WITH DIABETIC POLYNEUROPATHY, WITH LONG-TERM CURRENT USE OF INSULIN (HCC): ICD-10-CM

## 2025-04-18 LAB
ALBUMIN SERPL-MCNC: 4.4 G/DL (ref 3.5–5.2)
ALBUMIN/GLOB SERPL: 1.8 {RATIO} (ref 1–2.5)
ALP SERPL-CCNC: 75 U/L (ref 40–129)
ALT SERPL-CCNC: 52 U/L (ref 10–50)
ANION GAP SERPL CALCULATED.3IONS-SCNC: 12 MMOL/L (ref 9–16)
AST SERPL-CCNC: 33 U/L (ref 10–50)
BILIRUB SERPL-MCNC: 1.2 MG/DL (ref 0–1.2)
BUN SERPL-MCNC: 22 MG/DL (ref 8–23)
CALCIUM SERPL-MCNC: 10.5 MG/DL (ref 8.6–10.4)
CHLORIDE SERPL-SCNC: 100 MMOL/L (ref 98–107)
CHOLEST SERPL-MCNC: 148 MG/DL (ref 0–199)
CHOLESTEROL/HDL RATIO: 3.5
CO2 SERPL-SCNC: 26 MMOL/L (ref 20–31)
CREAT SERPL-MCNC: 1.2 MG/DL (ref 0.7–1.2)
GFR, ESTIMATED: 63 ML/MIN/1.73M2
GLUCOSE SERPL-MCNC: 160 MG/DL (ref 74–99)
HDLC SERPL-MCNC: 42 MG/DL
LDLC SERPL CALC-MCNC: 45 MG/DL (ref 0–100)
POTASSIUM SERPL-SCNC: 4.4 MMOL/L (ref 3.7–5.3)
PROT SERPL-MCNC: 6.9 G/DL (ref 6.6–8.7)
SODIUM SERPL-SCNC: 138 MMOL/L (ref 136–145)
TRIGL SERPL-MCNC: 304 MG/DL
VLDLC SERPL CALC-MCNC: 61 MG/DL (ref 1–30)

## 2025-04-18 PROCEDURE — A9595 HC RX DIAGNOSTIC RADIOPHARMACEUTICAL: HCPCS | Performed by: UROLOGY

## 2025-04-18 PROCEDURE — 80061 LIPID PANEL: CPT

## 2025-04-18 PROCEDURE — 83036 HEMOGLOBIN GLYCOSYLATED A1C: CPT

## 2025-04-18 PROCEDURE — 80053 COMPREHEN METABOLIC PANEL: CPT

## 2025-04-18 PROCEDURE — 2500000003 HC RX 250 WO HCPCS: Performed by: UROLOGY

## 2025-04-18 PROCEDURE — 85025 COMPLETE CBC W/AUTO DIFF WBC: CPT

## 2025-04-18 PROCEDURE — 78815 PET IMAGE W/CT SKULL-THIGH: CPT

## 2025-04-18 PROCEDURE — 3430000000 HC RX DIAGNOSTIC RADIOPHARMACEUTICAL: Performed by: UROLOGY

## 2025-04-18 RX ORDER — SODIUM CHLORIDE 0.9 % (FLUSH) 0.9 %
10 SYRINGE (ML) INJECTION ONCE
Status: COMPLETED | OUTPATIENT
Start: 2025-04-18 | End: 2025-04-18

## 2025-04-18 RX ADMIN — SODIUM CHLORIDE, PRESERVATIVE FREE 10 ML: 5 INJECTION INTRAVENOUS at 15:23

## 2025-04-18 RX ADMIN — PIFLUFOLASTAT F-18 9.95 MILLICURIE: 80 INJECTION INTRAVENOUS at 15:23

## 2025-04-19 LAB
BASOPHILS # BLD: 0.03 K/UL (ref 0–0.2)
BASOPHILS NFR BLD: 1 % (ref 0–2)
EOSINOPHIL # BLD: 0.09 K/UL (ref 0–0.44)
EOSINOPHILS RELATIVE PERCENT: 2 % (ref 1–4)
ERYTHROCYTE [DISTWIDTH] IN BLOOD BY AUTOMATED COUNT: 13.3 % (ref 11.8–14.4)
EST. AVERAGE GLUCOSE BLD GHB EST-MCNC: 186 MG/DL
HBA1C MFR BLD: 8.1 % (ref 4–6)
HCT VFR BLD AUTO: 49.1 % (ref 40.7–50.3)
HGB BLD-MCNC: 15.8 G/DL (ref 13–17)
IMM GRANULOCYTES # BLD AUTO: <0.03 K/UL (ref 0–0.3)
IMM GRANULOCYTES NFR BLD: 0 %
LYMPHOCYTES NFR BLD: 1.55 K/UL (ref 1.1–3.7)
LYMPHOCYTES RELATIVE PERCENT: 31 % (ref 24–43)
MCH RBC QN AUTO: 29.6 PG (ref 25.2–33.5)
MCHC RBC AUTO-ENTMCNC: 32.2 G/DL (ref 28.4–34.8)
MCV RBC AUTO: 91.9 FL (ref 82.6–102.9)
MONOCYTES NFR BLD: 0.48 K/UL (ref 0.1–1.2)
MONOCYTES NFR BLD: 10 % (ref 3–12)
NEUTROPHILS NFR BLD: 56 % (ref 36–65)
NEUTS SEG NFR BLD: 2.88 K/UL (ref 1.5–8.1)
NRBC BLD-RTO: 0 PER 100 WBC
PLATELET # BLD AUTO: 172 K/UL (ref 138–453)
PMV BLD AUTO: 11.3 FL (ref 8.1–13.5)
RBC # BLD AUTO: 5.34 M/UL (ref 4.21–5.77)
WBC OTHER # BLD: 5.1 K/UL (ref 3.5–11.3)

## 2025-07-03 NOTE — TELEPHONE ENCOUNTER
Rufino Armenta is calling to request a refill on the following medication(s):    Medication Request:  Requested Prescriptions     Pending Prescriptions Disp Refills    NOVOLIN 70/30 (70-30) 100 UNIT/ML injection vial [Pharmacy Med Name: NovoLIN 70/30 Subcutaneous Suspension (70-30) 100 UNIT/ML] 50 mL 3     Sig: INJECT 25 UNITS INTO THE SKIN 2 TIMES DAILY (BEFORE MEALS)       Last Visit Date (If Applicable):  3/19/2025    Next Visit Date:    7/22/2025

## 2025-07-03 NOTE — TELEPHONE ENCOUNTER
Rufino Armenta is calling to request a refill on the following medication(s):    Medication Request:  Requested Prescriptions     Pending Prescriptions Disp Refills    ipratropium (ATROVENT) 0.06 % nasal spray [Pharmacy Med Name: Ipratropium Bromide Nasal Solution 0.06 %] 105 mL 3     Sig: USE 2 SPRAYS IN EACH NOSTRIL THREE TIMES DAILY       Last Visit Date (If Applicable):  3/19/2025    Next Visit Date:    7/3/2025

## 2025-07-07 RX ORDER — IPRATROPIUM BROMIDE 42 UG/1
SPRAY, METERED NASAL
Qty: 105 ML | Refills: 3 | Status: SHIPPED | OUTPATIENT
Start: 2025-07-07

## 2025-07-07 RX ORDER — HUMAN INSULIN 100 [USP'U]/ML
INJECTION, SUSPENSION SUBCUTANEOUS
Qty: 50 ML | Refills: 3 | Status: SHIPPED | OUTPATIENT
Start: 2025-07-07

## 2025-07-09 RX ORDER — GABAPENTIN 600 MG/1
600 TABLET ORAL 3 TIMES DAILY
Qty: 270 TABLET | Refills: 3 | Status: SHIPPED | OUTPATIENT
Start: 2025-07-09 | End: 2026-07-04

## 2025-07-09 NOTE — TELEPHONE ENCOUNTER
Rufino Armenta is calling to request a refill on the following medication(s):    Medication Request:  Requested Prescriptions     Pending Prescriptions Disp Refills    gabapentin (NEURONTIN) 600 MG tablet [Pharmacy Med Name: Gabapentin Oral Tablet 600 MG] 270 tablet 3     Sig: Take 1 tablet by mouth 3 times daily.       Last Visit Date (If Applicable):  3/19/2025    Next Visit Date:    7/22/2025

## 2025-07-15 LAB
ALBUMIN: 4.7 G/DL (ref 3.5–5.2)
ALK PHOSPHATASE: 82 U/L (ref 39–118)
ALT SERPL-CCNC: 45 U/L (ref 5–41)
ANION GAP SERPL CALCULATED.3IONS-SCNC: 13 MMOL/L (ref 7–16)
AST SERPL-CCNC: 29 U/L (ref 9–50)
BASOPHILS ABSOLUTE: 0.03 K/UL (ref 0–0.2)
BASOPHILS RELATIVE PERCENT: 0.6 % (ref 0–2)
BILIRUB SERPL-MCNC: 0.8 MG/DL
BUN BLDV-MCNC: 30 MG/DL (ref 8–23)
CALCIUM SERPL-MCNC: 11.5 MG/DL (ref 8.6–10.5)
CHLORIDE BLD-SCNC: 103 MMOL/L (ref 96–107)
CHOLESTEROL, TOTAL: 161 MG/DL (ref 100–199)
CHOLESTEROL/HDL RATIO: 3.9 (ref 2–4.5)
CO2: 25 MMOL/L (ref 18–32)
CREAT SERPL-MCNC: 1.26 MG/DL (ref 0.67–1.3)
EGFR IF NONAFRICAN AMERICAN: 60 ML/MIN/1.73M2
EOSINOPHILS ABSOLUTE: 0.27 K/UL (ref 0–0.8)
EOSINOPHILS RELATIVE PERCENT: 5.8 % (ref 0–5)
ESTIMATED AVERAGE GLUCOSE: 174 MG/DL
GLUCOSE: 148 MG/DL (ref 70–100)
HBA1C MFR BLD: 7.7 %
HCT VFR BLD CALC: 52.9 % (ref 39–52)
HDLC SERPL-MCNC: 41 MG/DL
HEMOGLOBIN: 16.7 G/DL (ref 13–18)
IMMATURE GRANS (ABS): 0.01 K/UL (ref 0–0.06)
IMMATURE GRANULOCYTES %: 0.2 % (ref 0–2)
LDL CHOLESTEROL: 56 MG/DL
LDL/HDL RATIO: 1.4
LYMPHOCYTES ABSOLUTE: 1.3 K/UL (ref 0.9–5.2)
LYMPHOCYTES RELATIVE PERCENT: 28 % (ref 20–45)
MCH RBC QN AUTO: 29.8 PG (ref 26–32)
MCHC RBC AUTO-ENTMCNC: 31.6 G/DL (ref 32–35)
MCV RBC AUTO: 94 FL (ref 75–100)
MONOCYTES ABSOLUTE: 0.39 K/UL (ref 0.1–1)
MONOCYTES RELATIVE PERCENT: 8.4 % (ref 0–13)
NEUTROPHILS ABSOLUTE: 2.65 K/UL (ref 1.9–8)
NEUTROPHILS RELATIVE PERCENT: 57 % (ref 45–75)
PDW BLD-RTO: 13.3 % (ref 11.2–14.8)
PLATELET # BLD: 169 THOUS/CMM (ref 140–440)
POTASSIUM SERPL-SCNC: 4.4 MMOL/L (ref 3.5–5.4)
RBC # BLD: 5.61 MILL/CMM (ref 4.4–6.1)
SODIUM BLD-SCNC: 141 MMOL/L (ref 135–148)
TOTAL PROTEIN: 7.3 G/DL (ref 6–8.3)
TRIGL SERPL-MCNC: 321 MG/DL (ref 20–149)
VLDLC SERPL CALC-MCNC: 64 MG/DL
WBC # BLD: 4.7 THDS/CMM (ref 3.6–11)

## 2025-07-22 ENCOUNTER — OFFICE VISIT (OUTPATIENT)
Age: 75
End: 2025-07-22

## 2025-07-22 VITALS
HEIGHT: 64 IN | WEIGHT: 183 LBS | BODY MASS INDEX: 31.24 KG/M2 | HEART RATE: 67 BPM | OXYGEN SATURATION: 97 % | SYSTOLIC BLOOD PRESSURE: 100 MMHG | DIASTOLIC BLOOD PRESSURE: 68 MMHG

## 2025-07-22 DIAGNOSIS — I48.0 PAROXYSMAL ATRIAL FIBRILLATION (HCC): ICD-10-CM

## 2025-07-22 DIAGNOSIS — L98.9 SKIN LESION OF BACK: Primary | ICD-10-CM

## 2025-07-22 DIAGNOSIS — E21.3 HYPERPARATHYROIDISM: ICD-10-CM

## 2025-07-22 DIAGNOSIS — I50.9 CONGESTIVE HEART FAILURE, NYHA CLASS 3, UNSPECIFIED CONGESTIVE HEART FAILURE TYPE (HCC): ICD-10-CM

## 2025-07-22 DIAGNOSIS — E83.52 HYPERCALCEMIA: ICD-10-CM

## 2025-07-22 PROBLEM — E11.621 DIABETIC ULCER OF RIGHT FOOT WITH FAT LAYER EXPOSED (HCC): Status: RESOLVED | Noted: 2023-07-07 | Resolved: 2025-07-22

## 2025-07-22 PROBLEM — L97.512 DIABETIC ULCER OF RIGHT FOOT WITH FAT LAYER EXPOSED (HCC): Status: RESOLVED | Noted: 2023-07-07 | Resolved: 2025-07-22

## 2025-07-22 RX ORDER — KETOROLAC TROMETHAMINE 5 MG/ML
SOLUTION OPHTHALMIC
COMMUNITY
Start: 2025-04-25

## 2025-07-22 RX ORDER — PREDNISOLONE ACETATE 10 MG/ML
SUSPENSION/ DROPS OPHTHALMIC
COMMUNITY
Start: 2025-04-25

## 2025-07-22 SDOH — ECONOMIC STABILITY: FOOD INSECURITY: WITHIN THE PAST 12 MONTHS, YOU WORRIED THAT YOUR FOOD WOULD RUN OUT BEFORE YOU GOT MONEY TO BUY MORE.: NEVER TRUE

## 2025-07-22 SDOH — ECONOMIC STABILITY: FOOD INSECURITY: WITHIN THE PAST 12 MONTHS, THE FOOD YOU BOUGHT JUST DIDN'T LAST AND YOU DIDN'T HAVE MONEY TO GET MORE.: NEVER TRUE

## 2025-07-22 ASSESSMENT — PATIENT HEALTH QUESTIONNAIRE - PHQ9
SUM OF ALL RESPONSES TO PHQ QUESTIONS 1-9: 0
SUM OF ALL RESPONSES TO PHQ QUESTIONS 1-9: 0
2. FEELING DOWN, DEPRESSED OR HOPELESS: NOT AT ALL
1. LITTLE INTEREST OR PLEASURE IN DOING THINGS: NOT AT ALL
SUM OF ALL RESPONSES TO PHQ QUESTIONS 1-9: 0
SUM OF ALL RESPONSES TO PHQ QUESTIONS 1-9: 0

## 2025-07-22 ASSESSMENT — ENCOUNTER SYMPTOMS
CHEST TIGHTNESS: 0
SHORTNESS OF BREATH: 0

## 2025-07-22 NOTE — PROGRESS NOTES
Vaping status: Never Used   Substance and Sexual Activity    Alcohol use: No    Drug use: No    Sexual activity: Yes     Partners: Female     Comment:      Social Drivers of Health     Financial Resource Strain: Low Risk  (5/27/2024)    Overall Financial Resource Strain (CARDIA)     Difficulty of Paying Living Expenses: Not hard at all   Food Insecurity: No Food Insecurity (7/22/2025)    Hunger Vital Sign     Worried About Running Out of Food in the Last Year: Never true     Ran Out of Food in the Last Year: Never true   Transportation Needs: No Transportation Needs (7/22/2025)    PRAPARE - Transportation     Lack of Transportation (Medical): No     Lack of Transportation (Non-Medical): No   Physical Activity: Insufficiently Active (9/3/2024)    Exercise Vital Sign     Days of Exercise per Week: 3 days     Minutes of Exercise per Session: 20 min    Received from [facility], [facility]    [place]   Housing Stability: Low Risk  (7/22/2025)    Housing Stability Vital Sign     Unable to Pay for Housing in the Last Year: No     Number of Times Moved in the Last Year: 0     Homeless in the Last Year: No        Family History   Problem Relation Age of Onset    Heart Disease Mother     High Blood Pressure Father     Heart Disease Father         PHYSICAL EXAM     /68   Pulse 67   Ht 1.626 m (5' 4\")   Wt 83 kg (183 lb)   SpO2 97%   BMI 31.41 kg/m²    Physical Exam  Constitutional:       Appearance: Normal appearance.   HENT:      Head: Normocephalic and atraumatic.      Right Ear: Tympanic membrane normal.   Eyes:      Extraocular Movements: Extraocular movements intact.      Pupils: Pupils are equal, round, and reactive to light.   Cardiovascular:      Rate and Rhythm: Normal rate and regular rhythm.      Pulses: Normal pulses.   Pulmonary:      Breath sounds: Normal breath sounds.   Abdominal:      General: Bowel sounds are normal.      Palpations: Abdomen is

## 2025-07-31 RX ORDER — LOSARTAN POTASSIUM 25 MG/1
25 TABLET ORAL DAILY
Qty: 90 TABLET | Refills: 3 | Status: SHIPPED | OUTPATIENT
Start: 2025-07-31

## 2025-07-31 RX ORDER — SYRGE-NDL,INS 0.5 ML HALF MARK 31GX15/64"
SYRINGE, EMPTY DISPOSABLE MISCELLANEOUS
Qty: 200 EACH | Refills: 3 | Status: SHIPPED | OUTPATIENT
Start: 2025-07-31

## 2025-07-31 NOTE — TELEPHONE ENCOUNTER
Rufino Armenta is calling to request a refill on the following medication(s):    Medication Request:  Requested Prescriptions     Pending Prescriptions Disp Refills    losartan (COZAAR) 25 MG tablet [Pharmacy Med Name: LOSARTAN POTASSIUM 25 MG Oral Tablet] 90 tablet 3     Sig: TAKE 1 TABLET EVERY DAY    DROPLET INSULIN SYRINGE 31G X 15/64\" 0.5 ML MISC [Pharmacy Med Name: DROPLET INSULIN SYRINGE/U-100/0.5ML/31G X 15/64\" 31G X 15/64\" 0.5 ML] 200 each 3     Sig: USE AS DIRECTED WITH INSULIN TWICE DAILY       Last Visit Date (If Applicable):  7/22/2025    Next Visit Date:    9/5/2025

## 2025-09-03 LAB
ALBUMIN: 4.4 G/DL (ref 3.5–5.2)
ALK PHOSPHATASE: 72 U/L (ref 39–118)
ALT SERPL-CCNC: 49 U/L (ref 5–41)
ANION GAP SERPL CALCULATED.3IONS-SCNC: 10 MMOL/L (ref 7–16)
AST SERPL-CCNC: 34 U/L (ref 9–50)
BILIRUB SERPL-MCNC: 0.9 MG/DL
BUN BLDV-MCNC: 26 MG/DL (ref 8–23)
CALCIUM SERPL-MCNC: 11.2 MG/DL (ref 8.6–10.5)
CHLORIDE BLD-SCNC: 105 MMOL/L (ref 96–107)
CO2: 26 MMOL/L (ref 18–32)
CREAT SERPL-MCNC: 1.08 MG/DL (ref 0.67–1.3)
EGFR IF NONAFRICAN AMERICAN: 72 ML/MIN/1.73M2
GLUCOSE: 196 MG/DL (ref 70–100)
POTASSIUM SERPL-SCNC: 4.7 MMOL/L (ref 3.5–5.4)
PTH INTACT: 110 PG/ML (ref 12–65)
SODIUM BLD-SCNC: 141 MMOL/L (ref 135–148)
TOTAL PROTEIN: 6.7 G/DL (ref 6–8.3)

## 2025-09-04 LAB — CALCIUM IONIZED SERUM: 1.43 MMOL/L (ref 1.09–1.3)

## 2025-09-05 ENCOUNTER — OFFICE VISIT (OUTPATIENT)
Age: 75
End: 2025-09-05

## 2025-09-05 VITALS
SYSTOLIC BLOOD PRESSURE: 112 MMHG | DIASTOLIC BLOOD PRESSURE: 74 MMHG | OXYGEN SATURATION: 99 % | HEART RATE: 118 BPM | WEIGHT: 183.2 LBS | BODY MASS INDEX: 31.66 KG/M2

## 2025-09-05 DIAGNOSIS — E21.3 HYPERPARATHYROIDISM: ICD-10-CM

## 2025-09-05 DIAGNOSIS — E83.52 HYPERCALCEMIA: ICD-10-CM

## 2025-09-05 DIAGNOSIS — Z79.4 TYPE 2 DIABETES MELLITUS WITH DIABETIC POLYNEUROPATHY, WITH LONG-TERM CURRENT USE OF INSULIN (HCC): ICD-10-CM

## 2025-09-05 DIAGNOSIS — E11.42 TYPE 2 DIABETES MELLITUS WITH DIABETIC POLYNEUROPATHY, WITH LONG-TERM CURRENT USE OF INSULIN (HCC): ICD-10-CM

## 2025-09-05 DIAGNOSIS — I48.0 PAROXYSMAL ATRIAL FIBRILLATION (HCC): Primary | ICD-10-CM

## 2025-09-05 SDOH — ECONOMIC STABILITY: FOOD INSECURITY: WITHIN THE PAST 12 MONTHS, YOU WORRIED THAT YOUR FOOD WOULD RUN OUT BEFORE YOU GOT MONEY TO BUY MORE.: NEVER TRUE

## 2025-09-05 SDOH — ECONOMIC STABILITY: FOOD INSECURITY: WITHIN THE PAST 12 MONTHS, THE FOOD YOU BOUGHT JUST DIDN'T LAST AND YOU DIDN'T HAVE MONEY TO GET MORE.: NEVER TRUE

## 2025-09-05 ASSESSMENT — PATIENT HEALTH QUESTIONNAIRE - PHQ9
SUM OF ALL RESPONSES TO PHQ QUESTIONS 1-9: 0
2. FEELING DOWN, DEPRESSED OR HOPELESS: NOT AT ALL
SUM OF ALL RESPONSES TO PHQ QUESTIONS 1-9: 0
1. LITTLE INTEREST OR PLEASURE IN DOING THINGS: NOT AT ALL
SUM OF ALL RESPONSES TO PHQ QUESTIONS 1-9: 0
SUM OF ALL RESPONSES TO PHQ QUESTIONS 1-9: 0

## 2025-09-05 ASSESSMENT — ENCOUNTER SYMPTOMS
SHORTNESS OF BREATH: 0
CHEST TIGHTNESS: 0

## (undated) DEVICE — DILATOR: Brand: COOK

## (undated) DEVICE — PERRYSBURG ENDO PACK: Brand: MEDLINE INDUSTRIES, INC.

## (undated) DEVICE — SPONGE LAP W18XL18IN WHT COT 4 PLY FLD STRUNG RADPQ DISP ST 2 PER PACK

## (undated) DEVICE — CATHETER DIAG 5FR L110CM PIG CRV SZ 6 SIDE H DBL BRAID WIRE

## (undated) DEVICE — STRAP ARMBRD W1.5XL32IN FOAM STR YET SFT W/ HK AND LOOP

## (undated) DEVICE — ACCESS SHEATH WITH DILATOR: Brand: WATCHMAN FXD CURVE™ ACCESS SYSTEM

## (undated) DEVICE — GUIDEWIRE 35/260/FC/PTFE/3J: Brand: GUIDEWIRE

## (undated) DEVICE — PERCLOSE PROGLIDE™ SUTURE-MEDIATED CLOSURE SYSTEM: Brand: PERCLOSE PROGLIDE™

## (undated) DEVICE — SPONGE GZ W6XL6.75IN FLUF FOR PRE OP PREPPING CLN BULKEE II

## (undated) DEVICE — KIT MICRO INTRO 4FR STIFF 40CM NIGHTENALL TUNGSTEN 7SMT

## (undated) DEVICE — 4-PORT MANIFOLD: Brand: NEPTUNE 2

## (undated) DEVICE — ADAPTER CLEANING PORPOISE CLEANING

## (undated) DEVICE — 1 X VERSACROSS LARGE ACCESS TRANSSEPTAL DILATOR (INCLUDING 1 X J-TIP MECHANICAL GUIDEWIRE); 1 X VERSACROSS RF WIRE (INCLUDING 1 X CONNECTOR CABLE (SINGLE USE)); 1 X DISPERSIVE ELECTRODE: Brand: VERSACROSS LARGE ACCESS SOLUTION

## (undated) DEVICE — INTRODUCER SHTH STD 8 FRX11 CM FLX KINK RESIST CANN AVNT +

## (undated) DEVICE — Device

## (undated) DEVICE — SUTURE VCRL 2-0 L27IN ABSRB CT BRAID COAT UD J275H

## (undated) DEVICE — SUTURE VCRL 3-0 L27IN ABSRB UD PSL L30MM 1/2 CIR TAPERPOINT J502H

## (undated) DEVICE — Device: Brand: DEFENDO VALVE AND CONNECTOR KIT

## (undated) DEVICE — ELECTRODE PT RET AD L9FT HI MOIST COND ADH HYDRGEL CORDED